# Patient Record
Sex: FEMALE | Race: BLACK OR AFRICAN AMERICAN | NOT HISPANIC OR LATINO | Employment: FULL TIME | URBAN - METROPOLITAN AREA
[De-identification: names, ages, dates, MRNs, and addresses within clinical notes are randomized per-mention and may not be internally consistent; named-entity substitution may affect disease eponyms.]

---

## 2017-01-05 ENCOUNTER — OFFICE VISIT (OUTPATIENT)
Dept: FAMILY MEDICINE CLINIC | Facility: CLINIC | Age: 52
End: 2017-01-05

## 2017-01-05 VITALS
RESPIRATION RATE: 16 BRPM | SYSTOLIC BLOOD PRESSURE: 120 MMHG | TEMPERATURE: 98.3 F | WEIGHT: 196 LBS | HEART RATE: 92 BPM | DIASTOLIC BLOOD PRESSURE: 70 MMHG | BODY MASS INDEX: 34.72 KG/M2

## 2017-01-05 DIAGNOSIS — N76.0 ACUTE VAGINITIS: Primary | ICD-10-CM

## 2017-01-05 PROCEDURE — 99213 OFFICE O/P EST LOW 20 MIN: CPT | Performed by: FAMILY MEDICINE

## 2017-01-05 RX ORDER — KETOCONAZOLE 200 MG/1
200 TABLET ORAL DAILY
Qty: 10 TABLET | Refills: 0 | Status: SHIPPED | OUTPATIENT
Start: 2017-01-05 | End: 2017-05-09

## 2017-01-05 RX ORDER — IBUPROFEN AND FAMOTIDINE 800; 26.6 MG/1; MG/1
TABLET, COATED ORAL
COMMUNITY
Start: 2016-12-31 | End: 2017-05-09

## 2017-01-05 NOTE — PROGRESS NOTES
Subjective   Astrid Nobles is a 51 y.o. female.     History of Present Illness   Three weeks of vaginal discharge, not itchy, scant odor.   Worse after bubble bath.  Tried three day Monostat that helped. No fever No GI upset. No skin rash.   The following portions of the patient's history were reviewed and updated as appropriate: allergies, current medications, past family history, past medical history, past social history, past surgical history and problem list.    Review of Systems   Constitutional: Negative.    HENT: Negative.    Eyes: Negative.    Respiratory: Negative.    Cardiovascular: Negative.    Gastrointestinal: Negative.    Endocrine: Negative.    Genitourinary: Negative.  Negative for difficulty urinating, flank pain, genital sores and vaginal bleeding.   Musculoskeletal: Negative.        Objective   Physical Exam   Constitutional: She appears well-developed.   Pulmonary/Chest: Effort normal.   Skin: Skin is warm and dry. No rash noted.   Vitals reviewed.      Assessment/Plan   Astrid was seen today for vaginal discharge.    Diagnoses and all orders for this visit:    Acute vaginitis  -     ketoconazole (NIZORAL) 200 MG tablet; Take 1 tablet by mouth Daily.

## 2017-01-05 NOTE — MR AVS SNAPSHOT
Astrid Nobles   1/5/2017 3:00 PM   Office Visit    Provider:  Raf Suero MD   Department:  [facility]   Dept Phone:  [phone]                Your Full Care Plan              Today's Medication Changes          These changes are accurate as of: 1/5/17  3:47 PM.  If you have any questions, ask your nurse or doctor.               New Medication(s)Ordered:     ketoconazole 200 MG tablet   Commonly known as:  NIZORAL   Take 1 tablet by mouth Daily.   Started by:  Raf Suero MD         Stop taking medication(s)listed here:     meclizine 50 MG tablet   Commonly known as:  ANTIVERT   Stopped by:  Raf Suero MD                Where to Get Your Medications      These medications were sent to [address] - [address] [place] [address] - [phone]  - [phone] [address] [address]     Phone:  [phone]     ketoconazole 200 MG tablet                  Your Updated Medication List          This list is accurate as of: 1/5/17  3:47 PM.  Always use your most recent med list.                DUEXIS 800-26.6 MG tablet   Generic drug:  Ibuprofen-Famotidine       ketoconazole 200 MG tablet   Commonly known as:  NIZORAL   Take 1 tablet by mouth Daily.               You Were Diagnosed With        Codes Comments    Acute vaginitis    -  Primary ICD-10-CM: N76.0  ICD-9-CM: 616.10       Instructions     None    Patient Instructions History      MyChart Signup     Our records indicate that you have declined [facility] MyChart signup. If you would like to sign up for [employer], please email [email or web address] or call [phone] to obtain an activation code.             Other Info from Your Visit           Your Appointments     Feb 16, 2017 10:30 AM EST   GYN FOLLOW UP with Boaz Diaz MD   [facility] (--)    [address] [facility] [address]  [facility] [address]   [phone]              Allergies     No Known Allergies      Reason for Visit     Vaginal Discharge           Vital Signs     Blood Pressure Pulse Temperature Respirations Weight Body Mass Index    120/70 92 98.3 °F (36.8 °C) 16 196 lb (88.9 kg) 34.72 kg/m2    Smoking Status                   Never Smoker           Problems and Diagnoses Noted     Inflammation of vagina    -  Primary

## 2017-05-09 ENCOUNTER — OFFICE VISIT (OUTPATIENT)
Dept: FAMILY MEDICINE CLINIC | Facility: CLINIC | Age: 52
End: 2017-05-09

## 2017-05-09 VITALS
HEART RATE: 92 BPM | SYSTOLIC BLOOD PRESSURE: 110 MMHG | DIASTOLIC BLOOD PRESSURE: 70 MMHG | RESPIRATION RATE: 16 BRPM | BODY MASS INDEX: 34.37 KG/M2 | WEIGHT: 194 LBS | TEMPERATURE: 98.1 F

## 2017-05-09 DIAGNOSIS — J06.9 ACUTE URI: Primary | ICD-10-CM

## 2017-05-09 PROCEDURE — 99213 OFFICE O/P EST LOW 20 MIN: CPT | Performed by: FAMILY MEDICINE

## 2017-05-09 RX ORDER — CEPHALEXIN 500 MG/1
500 CAPSULE ORAL 3 TIMES DAILY
Qty: 30 CAPSULE | Refills: 0 | Status: SHIPPED | OUTPATIENT
Start: 2017-05-09 | End: 2017-06-20

## 2017-05-09 RX ORDER — GUAIFENESIN AND CODEINE PHOSPHATE 100; 10 MG/5ML; MG/5ML
5 SOLUTION ORAL 3 TIMES DAILY PRN
Qty: 118 ML | Refills: 0 | Status: SHIPPED | OUTPATIENT
Start: 2017-05-09 | End: 2017-06-20

## 2017-06-20 ENCOUNTER — OFFICE VISIT (OUTPATIENT)
Dept: FAMILY MEDICINE CLINIC | Facility: CLINIC | Age: 52
End: 2017-06-20

## 2017-06-20 VITALS
HEART RATE: 78 BPM | OXYGEN SATURATION: 97 % | SYSTOLIC BLOOD PRESSURE: 114 MMHG | BODY MASS INDEX: 33.42 KG/M2 | HEIGHT: 63 IN | DIASTOLIC BLOOD PRESSURE: 70 MMHG | WEIGHT: 188.6 LBS

## 2017-06-20 DIAGNOSIS — M72.2 PLANTAR FASCIA SYNDROME: Primary | ICD-10-CM

## 2017-06-20 PROCEDURE — 99212 OFFICE O/P EST SF 10 MIN: CPT | Performed by: FAMILY MEDICINE

## 2017-06-20 NOTE — PROGRESS NOTES
Subjective   Astrid Nobles is a 52 y.o. female.     History of Present Illness   BP at podiatry 138/ .  Treated for plantar fascitis. Wearing walking boot now along with physical therapy.  Even had MRI.   The following portions of the patient's history were reviewed and updated as appropriate: allergies, current medications, past family history, past medical history, past social history, past surgical history and problem list.    Review of Systems   Constitutional: Negative.    Respiratory: Negative.    Cardiovascular: Negative for leg swelling.   Neurological: Negative for dizziness and headaches.       Objective   Physical Exam   Constitutional: She is oriented to person, place, and time. She appears well-developed and well-nourished. No distress.   Neck: Neck supple.   Cardiovascular: Normal rate and regular rhythm.    No murmur heard.  Pulmonary/Chest: Effort normal and breath sounds normal.   Lymphadenopathy:     She has no cervical adenopathy.   Neurological: She is alert and oriented to person, place, and time.   Vitals reviewed.      Assessment/Plan   Astrid was seen today for hypertension.    Diagnoses and all orders for this visit:    Plantar fascia syndrome      Continue with podiatry   No treatment for blood pressure at this time.

## 2017-09-06 ENCOUNTER — OFFICE VISIT (OUTPATIENT)
Dept: FAMILY MEDICINE CLINIC | Facility: CLINIC | Age: 52
End: 2017-09-06

## 2017-09-06 VITALS
DIASTOLIC BLOOD PRESSURE: 60 MMHG | WEIGHT: 190 LBS | RESPIRATION RATE: 16 BRPM | SYSTOLIC BLOOD PRESSURE: 100 MMHG | TEMPERATURE: 98.4 F | HEART RATE: 72 BPM | BODY MASS INDEX: 33.66 KG/M2

## 2017-09-06 DIAGNOSIS — L81.9 CHANGE IN PIGMENTED SKIN LESION: Primary | ICD-10-CM

## 2017-09-06 PROCEDURE — 99213 OFFICE O/P EST LOW 20 MIN: CPT | Performed by: FAMILY MEDICINE

## 2017-09-06 RX ORDER — CLOTRIMAZOLE AND BETAMETHASONE DIPROPIONATE 10; .5 MG/ML; MG/ML
LOTION TOPICAL 2 TIMES DAILY
Qty: 30 ML | Refills: 1 | Status: SHIPPED | OUTPATIENT
Start: 2017-09-06 | End: 2017-10-13

## 2017-09-06 NOTE — PROGRESS NOTES
Subjective   Astrid Nobles is a 52 y.o. female.     History of Present Illness   Past week noticed decreased skin coloration left Achilles and ankle.  No itching, no dry skin or scaling.  Noticed while applying lotion. No foot or ankle pain.  No other pigmentation changes, no darkened spots. No vision difficulties.   The following portions of the patient's history were reviewed and updated as appropriate: allergies, current medications, past family history, past medical history, past social history, past surgical history and problem list.    Review of Systems   Constitutional: Negative.    HENT: Negative.    Skin: Positive for color change.       Objective   Physical Exam   Constitutional: She appears well-developed and well-nourished. No distress.   Pulmonary/Chest: Effort normal.   Neurological: She is alert.   Skin: Skin is intact.   Decreased pigmentation left medial ankle and heel.  No rough or inflamed skin.    Vitals reviewed.      Assessment/Plan   Astrid was seen today for skin problem.    Diagnoses and all orders for this visit:    Change in pigmented skin lesion  -     clotrimazole-betamethasone (LOTRISONE) 1-0.05 % lotion; Apply  topically 2 (Two) Times a Day.

## 2017-09-28 ENCOUNTER — TRANSCRIBE ORDERS (OUTPATIENT)
Dept: ADMINISTRATIVE | Facility: HOSPITAL | Age: 52
End: 2017-09-28

## 2017-09-28 DIAGNOSIS — R92.8 ABNORMAL MAMMOGRAM: Primary | ICD-10-CM

## 2017-10-13 ENCOUNTER — OFFICE VISIT (OUTPATIENT)
Dept: FAMILY MEDICINE CLINIC | Facility: CLINIC | Age: 52
End: 2017-10-13

## 2017-10-13 VITALS
RESPIRATION RATE: 16 BRPM | WEIGHT: 196 LBS | TEMPERATURE: 98.6 F | DIASTOLIC BLOOD PRESSURE: 80 MMHG | SYSTOLIC BLOOD PRESSURE: 118 MMHG | BODY MASS INDEX: 34.72 KG/M2 | HEART RATE: 76 BPM

## 2017-10-13 DIAGNOSIS — K29.00 ACUTE GASTRITIS WITHOUT HEMORRHAGE, UNSPECIFIED GASTRITIS TYPE: Primary | ICD-10-CM

## 2017-10-13 PROCEDURE — 99213 OFFICE O/P EST LOW 20 MIN: CPT | Performed by: FAMILY MEDICINE

## 2017-10-13 RX ORDER — OMEPRAZOLE 20 MG/1
20 TABLET, DELAYED RELEASE ORAL DAILY
Qty: 30 TABLET | Refills: 1 | Status: SHIPPED | OUTPATIENT
Start: 2017-10-13 | End: 2017-12-15 | Stop reason: SDUPTHER

## 2017-10-13 NOTE — PROGRESS NOTES
Subjective   Astrid Nobles is a 52 y.o. female.     History of Present Illness   Four days of epigastric burning without nausea.  No night discomfort. Some belching, not bloated.  No bowel trouble, no cough.  Took no medication. Some improvement with eating. No back or shoulder pain. No use of ibuprofen or aspirin.  The following portions of the patient's history were reviewed and updated as appropriate: allergies, current medications, past family history, past medical history, past social history, past surgical history and problem list.    Review of Systems   Constitutional: Negative.    HENT: Negative.    Respiratory: Negative.    Cardiovascular: Negative.    Gastrointestinal: Negative for abdominal distention, abdominal pain, constipation and nausea.   Allergic/Immunologic: Negative.        Objective   Physical Exam   Constitutional: She appears well-developed.   Neck: Neck supple.   Cardiovascular: Regular rhythm.    Pulmonary/Chest: Breath sounds normal.   Abdominal: Soft. Bowel sounds are normal. She exhibits no distension and no mass.   Vitals reviewed.      Assessment/Plan   Astrid was seen today for gi problem.    Diagnoses and all orders for this visit:    Acute gastritis without hemorrhage, unspecified gastritis type  -     omeprazole OTC (PRILOSEC OTC) 20 MG EC tablet; Take 1 tablet by mouth Daily.

## 2017-11-09 ENCOUNTER — APPOINTMENT (OUTPATIENT)
Dept: MAMMOGRAPHY | Facility: HOSPITAL | Age: 52
End: 2017-11-09

## 2017-12-15 DIAGNOSIS — K29.00 ACUTE GASTRITIS WITHOUT HEMORRHAGE, UNSPECIFIED GASTRITIS TYPE: ICD-10-CM

## 2017-12-15 RX ORDER — OMEPRAZOLE 20 MG/1
CAPSULE, DELAYED RELEASE ORAL
Qty: 30 CAPSULE | Refills: 3 | Status: SHIPPED | OUTPATIENT
Start: 2017-12-15 | End: 2017-12-28 | Stop reason: SDUPTHER

## 2017-12-26 ENCOUNTER — HOSPITAL ENCOUNTER (OUTPATIENT)
Dept: MAMMOGRAPHY | Facility: HOSPITAL | Age: 52
Discharge: HOME | End: 2017-12-26
Admitting: FAMILY MEDICINE

## 2017-12-26 DIAGNOSIS — R92.8 ABNORMAL MAMMOGRAM: ICD-10-CM

## 2017-12-26 PROCEDURE — 77066 DX MAMMO INCL CAD BI: CPT | Performed by: RADIOLOGY

## 2017-12-26 PROCEDURE — G0279 TOMOSYNTHESIS, MAMMO: HCPCS

## 2017-12-26 PROCEDURE — 77062 BREAST TOMOSYNTHESIS BI: CPT | Performed by: RADIOLOGY

## 2017-12-26 PROCEDURE — G0204 DX MAMMO INCL CAD BI: HCPCS

## 2017-12-28 ENCOUNTER — OFFICE VISIT (OUTPATIENT)
Dept: FAMILY MEDICINE CLINIC | Facility: CLINIC | Age: 52
End: 2017-12-28

## 2017-12-28 VITALS
RESPIRATION RATE: 16 BRPM | DIASTOLIC BLOOD PRESSURE: 80 MMHG | SYSTOLIC BLOOD PRESSURE: 136 MMHG | HEART RATE: 68 BPM | TEMPERATURE: 98.7 F | BODY MASS INDEX: 34.54 KG/M2 | WEIGHT: 195 LBS

## 2017-12-28 DIAGNOSIS — K29.00 ACUTE GASTRITIS WITHOUT HEMORRHAGE, UNSPECIFIED GASTRITIS TYPE: Primary | ICD-10-CM

## 2017-12-28 PROCEDURE — 99213 OFFICE O/P EST LOW 20 MIN: CPT | Performed by: FAMILY MEDICINE

## 2017-12-28 RX ORDER — OMEPRAZOLE 20 MG/1
20 CAPSULE, DELAYED RELEASE ORAL DAILY
Qty: 30 CAPSULE | Refills: 5 | Status: SHIPPED | OUTPATIENT
Start: 2017-12-28 | End: 2018-03-29

## 2017-12-28 NOTE — PROGRESS NOTES
Subjective   Astrid Nobles is a 52 y.o. female.     History of Present Illness   Continues to have acid symptoms if not taking omeprazole. No bloating, no night pain. No bowel trouble.  Left medial ankle sofr tissue skin darkening 2 cm area. Not tender or itching.  Past plantar facsiatis.   The following portions of the patient's history were reviewed and updated as appropriate: allergies, current medications, past family history, past medical history, past social history, past surgical history and problem list.    Review of Systems   Constitutional: Negative.    Respiratory: Negative.    Cardiovascular: Negative.    Gastrointestinal: Negative for abdominal distention, abdominal pain and constipation.   Musculoskeletal: Negative for gait problem.       Objective   Physical Exam   Constitutional: She appears well-developed.   Pulmonary/Chest: Effort normal.   Neurological: She is alert.   Vitals reviewed.      Assessment/Plan   Astrid was seen today for follow-up.    Diagnoses and all orders for this visit:    Acute gastritis without hemorrhage, unspecified gastritis type  -     omeprazole (priLOSEC) 20 MG capsule; Take 1 capsule by mouth Daily.

## 2018-01-15 ENCOUNTER — OFFICE VISIT (OUTPATIENT)
Dept: FAMILY MEDICINE CLINIC | Facility: CLINIC | Age: 53
End: 2018-01-15

## 2018-01-15 VITALS
RESPIRATION RATE: 16 BRPM | BODY MASS INDEX: 34.54 KG/M2 | SYSTOLIC BLOOD PRESSURE: 136 MMHG | TEMPERATURE: 98.3 F | WEIGHT: 195 LBS | HEART RATE: 80 BPM | DIASTOLIC BLOOD PRESSURE: 80 MMHG

## 2018-01-15 DIAGNOSIS — J06.9 ACUTE URI: Primary | ICD-10-CM

## 2018-01-15 PROCEDURE — 99213 OFFICE O/P EST LOW 20 MIN: CPT | Performed by: FAMILY MEDICINE

## 2018-01-15 RX ORDER — PROMETHAZINE HYDROCHLORIDE AND CODEINE PHOSPHATE 6.25; 1 MG/5ML; MG/5ML
5 SYRUP ORAL EVERY 6 HOURS PRN
Qty: 118 ML | Refills: 0 | Status: SHIPPED | OUTPATIENT
Start: 2018-01-15 | End: 2018-03-13 | Stop reason: HOSPADM

## 2018-01-15 RX ORDER — AZITHROMYCIN 250 MG/1
TABLET, FILM COATED ORAL
Qty: 6 TABLET | Refills: 0 | Status: SHIPPED | OUTPATIENT
Start: 2018-01-15 | End: 2018-03-29

## 2018-01-15 NOTE — PROGRESS NOTES
Subjective   Astrid Nobles is a 52 y.o. female.     History of Present Illness   Congestion, cough and some sputum two days. Took no medication. No GI upset.  Rested [ppr from cough.   The following portions of the patient's history were reviewed and updated as appropriate: allergies, current medications, past family history, past medical history, past social history, past surgical history and problem list.    Review of Systems   Constitutional: Positive for fatigue.   HENT: Positive for congestion.    Respiratory: Positive for cough, shortness of breath and wheezing.    Cardiovascular: Negative.    Gastrointestinal: Negative.        Objective   Physical Exam    Assessment/Plan   Astrid was seen today for uri.    Diagnoses and all orders for this visit:    Acute URI  -     azithromycin (ZITHROMAX) 250 MG tablet; Take 2 tablets the first day, then 1 tablet daily on days 2 through 5.  -     promethazine-codeine (PHENERGAN with CODEINE) 6.25-10 MG/5ML syrup; Take 5 mL by mouth Every 6 (Six) Hours As Needed for Cough.

## 2018-01-23 ENCOUNTER — TELEPHONE (OUTPATIENT)
Dept: FAMILY MEDICINE CLINIC | Facility: CLINIC | Age: 53
End: 2018-01-23

## 2018-01-23 DIAGNOSIS — K29.00 ACUTE GASTRITIS WITHOUT HEMORRHAGE, UNSPECIFIED GASTRITIS TYPE: Primary | ICD-10-CM

## 2018-01-23 NOTE — TELEPHONE ENCOUNTER
----- Message from Aspen Calvo sent at 1/23/2018  2:48 PM EST -----  Regarding: CALL PT  Contact: [phone]  PT HAS BEEN TAKING omeprazole (priLOSEC) 20 MG capsule Take 1 capsule by mouth Daily FOR ABOUT A MONTH AND SHE DOESN'T FEEL THAT IT IS HELPING OUT A LOT. SHE IS STILL HAVING THE SAME ISSUES. SHE WOULD LIKE TO KNOW WHAT TO DO NEXT.      Referral has been put in the system for EGD.  Pt notified.  BO Floyd

## 2018-02-06 ENCOUNTER — LAB REQUISITION (OUTPATIENT)
Dept: LAB | Facility: HOSPITAL | Age: 53
End: 2018-02-06

## 2018-02-06 ENCOUNTER — OUTSIDE FACILITY SERVICE (OUTPATIENT)
Dept: GASTROENTEROLOGY | Facility: CLINIC | Age: 53
End: 2018-02-06

## 2018-02-06 DIAGNOSIS — R10.13 EPIGASTRIC PAIN: ICD-10-CM

## 2018-02-06 DIAGNOSIS — K30 FUNCTIONAL DYSPEPSIA: ICD-10-CM

## 2018-02-06 PROCEDURE — 88305 TISSUE EXAM BY PATHOLOGIST: CPT | Performed by: INTERNAL MEDICINE

## 2018-02-06 PROCEDURE — 43239 EGD BIOPSY SINGLE/MULTIPLE: CPT | Performed by: INTERNAL MEDICINE

## 2018-02-07 ENCOUNTER — TELEPHONE (OUTPATIENT)
Dept: GASTROENTEROLOGY | Facility: CLINIC | Age: 53
End: 2018-02-07

## 2018-02-07 ENCOUNTER — LAB (OUTPATIENT)
Dept: LAB | Facility: HOSPITAL | Age: 53
End: 2018-02-07

## 2018-02-07 ENCOUNTER — APPOINTMENT (OUTPATIENT)
Dept: LAB | Facility: HOSPITAL | Age: 53
End: 2018-02-07

## 2018-02-07 DIAGNOSIS — C16.2 MALIGNANT NEOPLASM OF BODY OF STOMACH (HCC): Primary | ICD-10-CM

## 2018-02-07 DIAGNOSIS — C16.2 MALIGNANT NEOPLASM OF BODY OF STOMACH (HCC): ICD-10-CM

## 2018-02-07 LAB
ALBUMIN SERPL-MCNC: 4.1 G/DL (ref 3.2–4.8)
ALBUMIN/GLOB SERPL: 1.5 G/DL (ref 1.5–2.5)
ALP SERPL-CCNC: 81 U/L (ref 25–100)
ALT SERPL W P-5'-P-CCNC: 22 U/L (ref 7–40)
ANION GAP SERPL CALCULATED.3IONS-SCNC: 6 MMOL/L (ref 3–11)
AST SERPL-CCNC: 21 U/L (ref 0–33)
BASOPHILS # BLD AUTO: 0.03 10*3/MM3 (ref 0–0.2)
BASOPHILS NFR BLD AUTO: 0.5 % (ref 0–1)
BILIRUB SERPL-MCNC: 0.4 MG/DL (ref 0.3–1.2)
BUN BLD-MCNC: 11 MG/DL (ref 9–23)
BUN/CREAT SERPL: 15.7 (ref 7–25)
CALCIUM SPEC-SCNC: 8.6 MG/DL (ref 8.7–10.4)
CEA SERPL-MCNC: <0.5 NG/ML (ref 0–2.5)
CHLORIDE SERPL-SCNC: 104 MMOL/L (ref 99–109)
CO2 SERPL-SCNC: 25 MMOL/L (ref 20–31)
CREAT BLD-MCNC: 0.7 MG/DL (ref 0.6–1.3)
DEPRECATED RDW RBC AUTO: 48.8 FL (ref 37–54)
EOSINOPHIL # BLD AUTO: 0.08 10*3/MM3 (ref 0–0.3)
EOSINOPHIL NFR BLD AUTO: 1.3 % (ref 0–3)
ERYTHROCYTE [DISTWIDTH] IN BLOOD BY AUTOMATED COUNT: 20.3 % (ref 11.3–14.5)
GFR SERPL CREATININE-BSD FRML MDRD: 107 ML/MIN/1.73
GLOBULIN UR ELPH-MCNC: 2.7 GM/DL
GLUCOSE BLD-MCNC: 92 MG/DL (ref 70–100)
HCT VFR BLD AUTO: 30.5 % (ref 34.5–44)
HGB BLD-MCNC: 8.8 G/DL (ref 11.5–15.5)
HYPOCHROMIA BLD QL: NORMAL
IMM GRANULOCYTES # BLD: 0.02 10*3/MM3 (ref 0–0.03)
IMM GRANULOCYTES NFR BLD: 0.3 % (ref 0–0.6)
LYMPHOCYTES # BLD AUTO: 1.54 10*3/MM3 (ref 0.6–4.8)
LYMPHOCYTES NFR BLD AUTO: 24.4 % (ref 24–44)
MCH RBC QN AUTO: 19.2 PG (ref 27–31)
MCHC RBC AUTO-ENTMCNC: 28.9 G/DL (ref 32–36)
MCV RBC AUTO: 66.4 FL (ref 80–99)
MONOCYTES # BLD AUTO: 0.5 10*3/MM3 (ref 0–1)
MONOCYTES NFR BLD AUTO: 7.9 % (ref 0–12)
NEUTROPHILS # BLD AUTO: 4.13 10*3/MM3 (ref 1.5–8.3)
NEUTROPHILS NFR BLD AUTO: 65.6 % (ref 41–71)
PLAT MORPH BLD: NORMAL
PLATELET # BLD AUTO: 392 10*3/MM3 (ref 150–450)
PMV BLD AUTO: 10 FL (ref 6–12)
POTASSIUM BLD-SCNC: 4 MMOL/L (ref 3.5–5.5)
PROT SERPL-MCNC: 6.8 G/DL (ref 5.7–8.2)
RBC # BLD AUTO: 4.59 10*6/MM3 (ref 3.89–5.14)
SODIUM BLD-SCNC: 135 MMOL/L (ref 132–146)
WBC MORPH BLD: NORMAL
WBC NRBC COR # BLD: 6.3 10*3/MM3 (ref 3.5–10.8)

## 2018-02-07 PROCEDURE — 85007 BL SMEAR W/DIFF WBC COUNT: CPT

## 2018-02-07 PROCEDURE — 36415 COLL VENOUS BLD VENIPUNCTURE: CPT | Performed by: INTERNAL MEDICINE

## 2018-02-07 PROCEDURE — 82378 CARCINOEMBRYONIC ANTIGEN: CPT | Performed by: INTERNAL MEDICINE

## 2018-02-07 PROCEDURE — 80053 COMPREHEN METABOLIC PANEL: CPT | Performed by: INTERNAL MEDICINE

## 2018-02-07 PROCEDURE — 85025 COMPLETE CBC W/AUTO DIFF WBC: CPT

## 2018-02-07 NOTE — TELEPHONE ENCOUNTER
Dr Matt,  Dr Michelle called from Pathology called. Please call him back concerning this patient. Thanks

## 2018-02-07 NOTE — TELEPHONE ENCOUNTER
I called and discussed the results of the stomach biopsy.  There is evidence of gastric adenocarcinoma.  I will proceed on with a CT scan of the abdomen and pelvis.  I will also order some further lab data.  I discussed a referral to our surgical oncologist and medical oncologist.

## 2018-02-09 ENCOUNTER — HOSPITAL ENCOUNTER (OUTPATIENT)
Dept: CT IMAGING | Facility: HOSPITAL | Age: 53
Discharge: HOME | End: 2018-02-09
Attending: INTERNAL MEDICINE | Admitting: INTERNAL MEDICINE

## 2018-02-09 DIAGNOSIS — C16.2 MALIGNANT NEOPLASM OF BODY OF STOMACH (HCC): ICD-10-CM

## 2018-02-09 DIAGNOSIS — C16.2 MALIGNANT NEOPLASM OF BODY OF STOMACH (HCC): Primary | ICD-10-CM

## 2018-02-09 PROCEDURE — 74177 CT ABD & PELVIS W/CONTRAST: CPT

## 2018-02-09 PROCEDURE — 0 IOPAMIDOL 61 % SOLUTION: Performed by: INTERNAL MEDICINE

## 2018-02-09 RX ADMIN — IOPAMIDOL 90 ML: 612 INJECTION, SOLUTION INTRAVENOUS at 15:07

## 2018-02-09 RX ADMIN — BARIUM SULFATE 450 ML: 21 SUSPENSION ORAL at 02:10

## 2018-02-12 ENCOUNTER — TELEPHONE (OUTPATIENT)
Dept: GASTROENTEROLOGY | Facility: CLINIC | Age: 53
End: 2018-02-12

## 2018-02-12 LAB
CYTO UR: NORMAL
LAB AP CASE REPORT: NORMAL
LAB AP CLINICAL INFORMATION: NORMAL
LAB AP DIAGNOSIS COMMENT: NORMAL
Lab: NORMAL
PATH REPORT.ADDENDUM SPEC: NORMAL
PATH REPORT.FINAL DX SPEC: NORMAL
PATH REPORT.GROSS SPEC: NORMAL

## 2018-02-12 NOTE — TELEPHONE ENCOUNTER
Spoke with Ms. Nobles regarding the CT scan findings.  There was no evidence for metastasis to the liver or extragastric extension.  There was no obvious lymph nodes per the radiology report.  Will proceed on with surgical oncology evaluation with Dr. Nicole Crooks.

## 2018-03-06 ENCOUNTER — TRANSCRIBE ORDERS (OUTPATIENT)
Dept: ADMINISTRATIVE | Facility: HOSPITAL | Age: 53
End: 2018-03-06

## 2018-03-06 DIAGNOSIS — C16.2 MALIGNANT NEOPLASM OF BODY OF STOMACH (HCC): Primary | ICD-10-CM

## 2018-03-08 ENCOUNTER — APPOINTMENT (OUTPATIENT)
Dept: CT IMAGING | Facility: HOSPITAL | Age: 53
End: 2018-03-08
Attending: SURGERY

## 2018-03-09 ENCOUNTER — HOSPITAL ENCOUNTER (OUTPATIENT)
Dept: CT IMAGING | Facility: HOSPITAL | Age: 53
Discharge: HOME | End: 2018-03-09
Attending: SURGERY | Admitting: SURGERY

## 2018-03-09 ENCOUNTER — APPOINTMENT (OUTPATIENT)
Dept: PREADMISSION TESTING | Facility: HOSPITAL | Age: 53
End: 2018-03-09

## 2018-03-09 VITALS — BODY MASS INDEX: 33.35 KG/M2 | WEIGHT: 181.22 LBS | HEIGHT: 62 IN

## 2018-03-09 DIAGNOSIS — C16.2 MALIGNANT NEOPLASM OF BODY OF STOMACH (HCC): ICD-10-CM

## 2018-03-09 LAB
ABO GROUP BLD: NORMAL
B-HCG UR QL: NEGATIVE
BLD GP AB SCN SERPL QL: POSITIVE
COLD SCREEN: POSITIVE
DEPRECATED RDW RBC AUTO: 46.7 FL (ref 37–54)
ERYTHROCYTE [DISTWIDTH] IN BLOOD BY AUTOMATED COUNT: 19.7 % (ref 11.3–14.5)
HCT VFR BLD AUTO: 28.9 % (ref 34.5–44)
HGB BLD-MCNC: 8.5 G/DL (ref 11.5–15.5)
MCH RBC QN AUTO: 19.1 PG (ref 27–31)
MCHC RBC AUTO-ENTMCNC: 29.4 G/DL (ref 32–36)
MCV RBC AUTO: 64.8 FL (ref 80–99)
NONSPECIFIC COLD ANTIBODY: NORMAL
PLATELET # BLD AUTO: 351 10*3/MM3 (ref 150–450)
PMV BLD AUTO: 9.7 FL (ref 6–12)
RBC # BLD AUTO: 4.46 10*6/MM3 (ref 3.89–5.14)
RH BLD: POSITIVE
SALINE SCREEN: NEGATIVE
WBC NRBC COR # BLD: 4.82 10*3/MM3 (ref 3.5–10.8)

## 2018-03-09 PROCEDURE — 86900 BLOOD TYPING SEROLOGIC ABO: CPT | Performed by: SURGERY

## 2018-03-09 PROCEDURE — 86850 RBC ANTIBODY SCREEN: CPT | Performed by: SURGERY

## 2018-03-09 PROCEDURE — 85027 COMPLETE CBC AUTOMATED: CPT | Performed by: ANESTHESIOLOGY

## 2018-03-09 PROCEDURE — 86901 BLOOD TYPING SEROLOGIC RH(D): CPT | Performed by: SURGERY

## 2018-03-09 PROCEDURE — 93005 ELECTROCARDIOGRAM TRACING: CPT

## 2018-03-09 PROCEDURE — 86870 RBC ANTIBODY IDENTIFICATION: CPT | Performed by: SURGERY

## 2018-03-09 PROCEDURE — 71250 CT THORAX DX C-: CPT

## 2018-03-09 PROCEDURE — 93010 ELECTROCARDIOGRAM REPORT: CPT | Performed by: INTERNAL MEDICINE

## 2018-03-09 PROCEDURE — 81025 URINE PREGNANCY TEST: CPT | Performed by: SURGERY

## 2018-03-12 ENCOUNTER — ANESTHESIA EVENT (OUTPATIENT)
Dept: PERIOP | Facility: HOSPITAL | Age: 53
End: 2018-03-12

## 2018-03-13 ENCOUNTER — ANESTHESIA (OUTPATIENT)
Dept: PERIOP | Facility: HOSPITAL | Age: 53
End: 2018-03-13

## 2018-03-13 ENCOUNTER — HOSPITAL ENCOUNTER (OUTPATIENT)
Facility: HOSPITAL | Age: 53
Setting detail: SURGERY ADMIT
Discharge: HOME | End: 2018-03-13
Attending: SURGERY | Admitting: SURGERY

## 2018-03-13 VITALS
TEMPERATURE: 97.5 F | HEIGHT: 62 IN | DIASTOLIC BLOOD PRESSURE: 75 MMHG | BODY MASS INDEX: 33.31 KG/M2 | RESPIRATION RATE: 18 BRPM | WEIGHT: 181 LBS | SYSTOLIC BLOOD PRESSURE: 120 MMHG | OXYGEN SATURATION: 95 % | HEART RATE: 75 BPM

## 2018-03-13 DIAGNOSIS — C16.9 GASTRIC CANCER (HCC): ICD-10-CM

## 2018-03-13 LAB
B-HCG UR QL: NEGATIVE
INTERNAL NEGATIVE CONTROL: NORMAL
INTERNAL POSITIVE CONTROL: REACTIVE
Lab: NORMAL

## 2018-03-13 PROCEDURE — 25010000002 MIDAZOLAM PER 1 MG: Performed by: ANESTHESIOLOGY

## 2018-03-13 PROCEDURE — 25010000002 DEXAMETHASONE PER 1 MG: Performed by: ANESTHESIOLOGY

## 2018-03-13 PROCEDURE — 25010000002 HYDROMORPHONE PER 4 MG: Performed by: ANESTHESIOLOGY

## 2018-03-13 PROCEDURE — 25010000002 FENTANYL CITRATE (PF) 100 MCG/2ML SOLUTION: Performed by: ANESTHESIOLOGY

## 2018-03-13 PROCEDURE — 25010000002 ONDANSETRON PER 1 MG: Performed by: ANESTHESIOLOGY

## 2018-03-13 PROCEDURE — 88112 CYTOPATH CELL ENHANCE TECH: CPT | Performed by: SURGERY

## 2018-03-13 PROCEDURE — 25010000002 NEOSTIGMINE PER 0.5 MG: Performed by: ANESTHESIOLOGY

## 2018-03-13 PROCEDURE — 25010000002 PROPOFOL 10 MG/ML EMULSION: Performed by: ANESTHESIOLOGY

## 2018-03-13 PROCEDURE — 25010000003 CEFAZOLIN IN DEXTROSE 2-4 GM/100ML-% SOLUTION: Performed by: ANESTHESIOLOGY

## 2018-03-13 PROCEDURE — 88305 TISSUE EXAM BY PATHOLOGIST: CPT | Performed by: SURGERY

## 2018-03-13 RX ORDER — DEXAMETHASONE SODIUM PHOSPHATE 4 MG/ML
INJECTION, SOLUTION INTRA-ARTICULAR; INTRALESIONAL; INTRAMUSCULAR; INTRAVENOUS; SOFT TISSUE AS NEEDED
Status: DISCONTINUED | OUTPATIENT
Start: 2018-03-13 | End: 2018-03-13 | Stop reason: SURG

## 2018-03-13 RX ORDER — PROPOFOL 10 MG/ML
VIAL (ML) INTRAVENOUS AS NEEDED
Status: DISCONTINUED | OUTPATIENT
Start: 2018-03-13 | End: 2018-03-13 | Stop reason: SURG

## 2018-03-13 RX ORDER — MAGNESIUM HYDROXIDE 1200 MG/15ML
LIQUID ORAL AS NEEDED
Status: DISCONTINUED | OUTPATIENT
Start: 2018-03-13 | End: 2018-03-13 | Stop reason: HOSPADM

## 2018-03-13 RX ORDER — CEFAZOLIN SODIUM IN 0.9 % NACL 2 G/100 ML
2 PLASTIC BAG, INJECTION (ML) INTRAVENOUS ONCE
Status: DISCONTINUED | OUTPATIENT
Start: 2018-03-13 | End: 2018-03-13 | Stop reason: HOSPADM

## 2018-03-13 RX ORDER — FENTANYL CITRATE 50 UG/ML
50 INJECTION, SOLUTION INTRAMUSCULAR; INTRAVENOUS
Status: DISCONTINUED | OUTPATIENT
Start: 2018-03-13 | End: 2018-03-13 | Stop reason: HOSPADM

## 2018-03-13 RX ORDER — CEFAZOLIN SODIUM 2 G/100ML
INJECTION, SOLUTION INTRAVENOUS AS NEEDED
Status: DISCONTINUED | OUTPATIENT
Start: 2018-03-13 | End: 2018-03-13 | Stop reason: SURG

## 2018-03-13 RX ORDER — SODIUM CHLORIDE 0.9 % (FLUSH) 0.9 %
1-10 SYRINGE (ML) INJECTION AS NEEDED
Status: DISCONTINUED | OUTPATIENT
Start: 2018-03-13 | End: 2018-03-13 | Stop reason: HOSPADM

## 2018-03-13 RX ORDER — FENTANYL CITRATE 50 UG/ML
INJECTION, SOLUTION INTRAMUSCULAR; INTRAVENOUS AS NEEDED
Status: DISCONTINUED | OUTPATIENT
Start: 2018-03-13 | End: 2018-03-13 | Stop reason: SURG

## 2018-03-13 RX ORDER — SODIUM CHLORIDE, SODIUM LACTATE, POTASSIUM CHLORIDE, CALCIUM CHLORIDE 600; 310; 30; 20 MG/100ML; MG/100ML; MG/100ML; MG/100ML
9 INJECTION, SOLUTION INTRAVENOUS CONTINUOUS
Status: DISCONTINUED | OUTPATIENT
Start: 2018-03-13 | End: 2018-03-13 | Stop reason: HOSPADM

## 2018-03-13 RX ORDER — BUPIVACAINE HYDROCHLORIDE AND EPINEPHRINE 2.5; 5 MG/ML; UG/ML
INJECTION, SOLUTION EPIDURAL; INFILTRATION; INTRACAUDAL; PERINEURAL AS NEEDED
Status: DISCONTINUED | OUTPATIENT
Start: 2018-03-13 | End: 2018-03-13 | Stop reason: HOSPADM

## 2018-03-13 RX ORDER — MIDAZOLAM HYDROCHLORIDE 1 MG/ML
INJECTION INTRAMUSCULAR; INTRAVENOUS AS NEEDED
Status: DISCONTINUED | OUTPATIENT
Start: 2018-03-13 | End: 2018-03-13 | Stop reason: SURG

## 2018-03-13 RX ORDER — PROMETHAZINE HYDROCHLORIDE 25 MG/1
25 SUPPOSITORY RECTAL ONCE AS NEEDED
Status: DISCONTINUED | OUTPATIENT
Start: 2018-03-13 | End: 2018-03-13 | Stop reason: HOSPADM

## 2018-03-13 RX ORDER — FAMOTIDINE 20 MG/1
20 TABLET, FILM COATED ORAL ONCE
Status: COMPLETED | OUTPATIENT
Start: 2018-03-13 | End: 2018-03-13

## 2018-03-13 RX ORDER — LIDOCAINE HYDROCHLORIDE 10 MG/ML
0.5 INJECTION, SOLUTION EPIDURAL; INFILTRATION; INTRACAUDAL; PERINEURAL ONCE AS NEEDED
Status: COMPLETED | OUTPATIENT
Start: 2018-03-13 | End: 2018-03-13

## 2018-03-13 RX ORDER — PROMETHAZINE HYDROCHLORIDE 25 MG/1
25 TABLET ORAL ONCE AS NEEDED
Status: DISCONTINUED | OUTPATIENT
Start: 2018-03-13 | End: 2018-03-13 | Stop reason: HOSPADM

## 2018-03-13 RX ORDER — HYDROMORPHONE HYDROCHLORIDE 1 MG/ML
0.5 INJECTION, SOLUTION INTRAMUSCULAR; INTRAVENOUS; SUBCUTANEOUS
Status: DISCONTINUED | OUTPATIENT
Start: 2018-03-13 | End: 2018-03-13 | Stop reason: HOSPADM

## 2018-03-13 RX ORDER — ONDANSETRON 2 MG/ML
INJECTION INTRAMUSCULAR; INTRAVENOUS AS NEEDED
Status: DISCONTINUED | OUTPATIENT
Start: 2018-03-13 | End: 2018-03-13 | Stop reason: SURG

## 2018-03-13 RX ORDER — PROMETHAZINE HYDROCHLORIDE 25 MG/ML
6.25 INJECTION, SOLUTION INTRAMUSCULAR; INTRAVENOUS ONCE AS NEEDED
Status: DISCONTINUED | OUTPATIENT
Start: 2018-03-13 | End: 2018-03-13 | Stop reason: HOSPADM

## 2018-03-13 RX ORDER — ATRACURIUM BESYLATE 10 MG/ML
INJECTION, SOLUTION INTRAVENOUS AS NEEDED
Status: DISCONTINUED | OUTPATIENT
Start: 2018-03-13 | End: 2018-03-13 | Stop reason: SURG

## 2018-03-13 RX ORDER — GLYCOPYRROLATE 0.2 MG/ML
INJECTION INTRAMUSCULAR; INTRAVENOUS AS NEEDED
Status: DISCONTINUED | OUTPATIENT
Start: 2018-03-13 | End: 2018-03-13 | Stop reason: SURG

## 2018-03-13 RX ORDER — SODIUM CHLORIDE, SODIUM LACTATE, POTASSIUM CHLORIDE, CALCIUM CHLORIDE 600; 310; 30; 20 MG/100ML; MG/100ML; MG/100ML; MG/100ML
INJECTION, SOLUTION INTRAVENOUS CONTINUOUS PRN
Status: DISCONTINUED | OUTPATIENT
Start: 2018-03-13 | End: 2018-03-13 | Stop reason: SURG

## 2018-03-13 RX ORDER — OXYCODONE HYDROCHLORIDE AND ACETAMINOPHEN 5; 325 MG/1; MG/1
1 TABLET ORAL ONCE AS NEEDED
Status: COMPLETED | OUTPATIENT
Start: 2018-03-13 | End: 2018-03-13

## 2018-03-13 RX ADMIN — GLYCOPYRROLATE 0.4 MG: 0.2 INJECTION INTRAMUSCULAR; INTRAVENOUS at 16:32

## 2018-03-13 RX ADMIN — DEXAMETHASONE SODIUM PHOSPHATE 8 MG: 4 INJECTION, SOLUTION INTRAMUSCULAR; INTRAVENOUS at 16:00

## 2018-03-13 RX ADMIN — PROPOFOL 150 MG: 10 INJECTION, EMULSION INTRAVENOUS at 15:50

## 2018-03-13 RX ADMIN — SODIUM CHLORIDE, POTASSIUM CHLORIDE, SODIUM LACTATE AND CALCIUM CHLORIDE: 600; 310; 30; 20 INJECTION, SOLUTION INTRAVENOUS at 15:50

## 2018-03-13 RX ADMIN — HYDROMORPHONE HYDROCHLORIDE 0.5 MG: 10 INJECTION INTRAMUSCULAR; INTRAVENOUS; SUBCUTANEOUS at 17:03

## 2018-03-13 RX ADMIN — OXYCODONE AND ACETAMINOPHEN 1 TABLET: 5; 325 TABLET ORAL at 18:27

## 2018-03-13 RX ADMIN — SODIUM CHLORIDE, POTASSIUM CHLORIDE, SODIUM LACTATE AND CALCIUM CHLORIDE 9 ML/HR: 600; 310; 30; 20 INJECTION, SOLUTION INTRAVENOUS at 11:30

## 2018-03-13 RX ADMIN — FAMOTIDINE 20 MG: 20 TABLET, FILM COATED ORAL at 11:28

## 2018-03-13 RX ADMIN — LIDOCAINE HYDROCHLORIDE 0.5 ML: 10 INJECTION, SOLUTION EPIDURAL; INFILTRATION; INTRACAUDAL; PERINEURAL at 11:30

## 2018-03-13 RX ADMIN — MIDAZOLAM HYDROCHLORIDE 2 MG: 1 INJECTION, SOLUTION INTRAMUSCULAR; INTRAVENOUS at 15:50

## 2018-03-13 RX ADMIN — Medication 2.5 MG: at 16:32

## 2018-03-13 RX ADMIN — ONDANSETRON 4 MG: 2 INJECTION INTRAMUSCULAR; INTRAVENOUS at 16:00

## 2018-03-13 RX ADMIN — CEFAZOLIN SODIUM 2 G: 2 INJECTION, SOLUTION INTRAVENOUS at 15:50

## 2018-03-13 RX ADMIN — FENTANYL CITRATE 50 MCG: 50 INJECTION INTRAMUSCULAR; INTRAVENOUS at 18:30

## 2018-03-13 RX ADMIN — FENTANYL CITRATE 100 MCG: 50 INJECTION, SOLUTION INTRAMUSCULAR; INTRAVENOUS at 15:50

## 2018-03-13 RX ADMIN — PROPOFOL 100 MG: 10 INJECTION, EMULSION INTRAVENOUS at 15:56

## 2018-03-13 RX ADMIN — ATRACURIUM BESYLATE 40 MG: 10 INJECTION, SOLUTION INTRAVENOUS at 15:50

## 2018-03-13 RX ADMIN — FENTANYL CITRATE 50 MCG: 50 INJECTION INTRAMUSCULAR; INTRAVENOUS at 17:08

## 2018-03-13 NOTE — DISCHARGE INSTRUCTIONS
1.  You may remove your bandages in 48 hours.  Leave the white strips on as they will fall off on their own.  2.  You may shower in 48 hours.  Gently rinse your incisions with soapy water.  Do not immerse your incisions in water (i.e...tub baths, hot tubs, etc...) until cleared by a physician.  3.  You may supplement your narcotic pain medications with IbuprofenTake a stool softener when taking narcotic pain medications.  4.  Do not drive while on narcotic pain medications.  5.  No heavy lifting greater than 10 pounds for 4 weeks after your surgery.  No strenuous activity (i.e... pushing, pulling, etc...) for 4 weeks after your surgery.  6.  Call the office for any fevers greater than 101.5, nausea, vomiting, pain not relieved by pain medications, redness/swelling/drainage from your incisions, inability to pass flatus or bowel movements, or any other concerning signs or symptoms.  7.  Follow up with Dr. Crooks at [facility] in 2 weeks (ph: [phone]).

## 2018-03-13 NOTE — BRIEF OP NOTE
DIAGNOSTIC LAPAROSCOPY  Progress Note    Astrid Nobles  Date of Surgery:  3/13/2018    Pre-op Diagnosis:   Gastric Cancer       Post-Op Diagnosis Codes:   Gastric cancer     Procedure(s):  Diagnostic Laparoscopy with biopsy of peritoneal nodule  Peritoneal Washings    Surgeon(s):  Nicole Crooks MD    Intravenous Fluids:  1000 mL    Anesthesia: General    Staff:   Circulator: Brandt Sterling RN; Lucila Cabrera RN  Scrub Person: Asifrichard Mckenzie  Nursing Assistant: Dione Wright    Estimated Blood Loss: Minimal    Urine Voided: Not measured    Specimens:                ID Type Source Tests Collected by Time   A : Peritoneal nodule Tissue Abdominal Wall TISSUE PATHOLOGY EXAM Nicole Crooks MD 3/13/2018 1620   B :  Wash Abdominal Wall NON-GYNECOLOGIC CYTOLOGY Nicole Crooks MD 3/13/2018 1627   C :  Wash Abdominal Wall NON-GYNECOLOGIC CYTOLOGY Nicole Crooks MD 3/13/2018 1628   D :  Wash Abdominal Wall NON-GYNECOLOGIC CYTOLOGY Nicole Crooks MD 3/13/2018 1629         Drains:  None    Findings:   1.  No peritoneal nodules or omental disease  2.  Area of serosal thickening in the proximal body of the stomach without visible tumor evident    Complications: None      Nicole Crooks MD     Date: 3/13/2018  Time: 5:26 PM

## 2018-03-13 NOTE — INTERVAL H&P NOTE
"  [facility] Pre-op    Full history and physical note from office is up to date.  See office note attached.    /77 (BP Location: Right arm, Patient Position: Lying)   Pulse 84   Temp 98 °F (36.7 °C) (Temporal Artery )   Resp 18   Ht 157.5 cm (62\")   Wt 82.1 kg (181 lb)   SpO2 99%   Breastfeeding? No   BMI 33.11 kg/m²     IMM:  Influenza:  no  Pneumococcal:  no  Tetanus:  unknown    Cancer Staging (if applicable)  Cancer Patient: __ yes __no __unknown__N/A; If yes, clinical stage T:__ N:__M:__, stage group or __N/A    Manasa Avila, APRN 3/13/2018 12:19 PM    Addendum:  I I have reviewed the above note, my prior clinic note, appropriate imaging, and labs.  There have been no changes to the history and physical.  I have again discussed the risks and benefits of diagnostic laparoscopy with biopsy and peritoneal washings with the patient.  All of their questions have been answered.  They understand and wish to proceed.    "

## 2018-03-13 NOTE — ANESTHESIA PREPROCEDURE EVALUATION
Anesthesia Evaluation                  Airway   Mallampati: I  TM distance: >3 FB  Neck ROM: full  no difficulty expected  Dental - normal exam     Pulmonary - normal exam   Cardiovascular - normal exam        Neuro/Psych  GI/Hepatic/Renal/Endo    (+)  GERD,      Musculoskeletal     Abdominal  - normal exam    Bowel sounds: normal.   Substance History      OB/GYN          Other      history of cancer (GASTRIC)                    Anesthesia Plan    ASA 3     general     intravenous induction   Anesthetic plan and risks discussed with patient.    Plan discussed with CRNA.

## 2018-03-14 NOTE — OP NOTE
General Surgery Operative Note    Astrid Nobles  [id number]  1965    Date of Surgery:  3/13/2018     Pre-op Diagnosis: Gastric Cancer    Post-op Diagnosis: Same    Procedure:     Procedure(s):  DIAGNOSTIC LAPAROSCOPY WITH BIOPSY, LYSIS OF ADHESIONS      Anesthesia: General    Surgeon: Nicole Crooks MD    Fluids: 1000 mL    Estimated Blood Loss: Minimal    Urine Voided: Not measured    Specimens:                ID Type Source Tests Collected by Time   A : Peritoneal nodule Tissue Abdominal Wall TISSUE PATHOLOGY EXAM Nicole Crooks MD 3/13/2018 1620   B :  Wash Abdominal Wall NON-GYNECOLOGIC CYTOLOGY Nicole Crooks MD 3/13/2018 1627   C :  Wash Abdominal Wall NON-GYNECOLOGIC CYTOLOGY Nicole Crooks MD 3/13/2018 1628   D :  Wash Abdominal Wall NON-GYNECOLOGIC CYTOLOGY Nicole Crooks MD 3/13/2018 1629           Findings:   1. Lobulated, soft nodule contained within an adhesion on the anterior abdominal wall.  2. Small area of serosal thickening in the proximal body of the stomach without visible tumor evident.      Complications: None    History:  Mrs. Nobles is a 53-year-old female with a 3 month history of indigestion and upper abdominal pain.  She was started on omeprazole for presumed reflux, but this did not relieve her symptoms.  For additional evaluation, she was referred to Dr. Matt who performed an EGD on 2.6.2018.  This showed a large, friable and ulcerated mass in the body of the stomach; biopsies returned moderately to poorly differentiated adenocarcinoma.  Staging studies, including CT scans of the chest, and pelvis were negative for distant disease.  There appeared to be enlarged lymph nodes along the gastroepiploic arcade concerning for terrance metastases.  I initially evaluated the patient on 3.2.2018 for her new diagnosis of gastric cancer.  To complete her staging, I recommended a diagnostic laparoscopy with peritoneal washings.  I counseled the patient as to the benefits and risks of the  procedure, including but not limited to: Bleeding, infection, injury to adjacent viscera, need for additional procedures or operations, cardiopulmonary complications, and deep venous thrombosis.  The patient understood these risks and wished to proceed.       Procedure:   After informed consent was obtained, the patient was brought to the operating room and placed in the supine position.  Gen. anesthesia was induced, the patient was intubated without difficulty.  Perioperative antibiotics were administered.  The abdomen was prepped and draped in the standard sterile fashion.  A timeout was observed, indicating the patient's name and intended procedure.    Entry to the abdomen was obtained via a 5 mm Optiview trocar in the left upper quadrant along the costal margin.  A brief survey of the underlying viscera was performed with the 0° laparoscope.  This revealed no underlying visceral injury from trocar entry.  At this point, the 0° scope was exchanged for a 30° laparoscope, and two additional ports were placed including one in the left lower abdomen more medial to the first and a second in the right mid abdomen lateral to the inferior epigastric vessels.  The operation was begun by examining the liver for any signs of metastatic disease.  The liver was free of disease.  Both hemidiaphragms were also examined and were determined to be free of disease.  The omentum and small bowel were normal in appearance without peritoneal nodules concerning for disease.  Upon examining the anterior abdominal wall, there was noted to be a fleshy, lobulated white lesion contained within an adhesion.  This was taken down with sharp transection.  In order to remove the specimen, the left upper quadrant 5 mm trocar was upsized to an 11 mm trocar.  The specimen was removed from the abdomen, and it was sent to pathology as a permanent specimen.  Next, the peritoneal washings were performed.  Approximately 30 mL of normal saline was  instilled in the right upper quadrant, left upper quadrant, and pelvis and suctioned out of the abdomen.  These specimens were sent to cytology as permanent specimens.  At this point, the operation was concluded.  All trochars were removed under direct visualization.  The abdomen was desufflated, and all skin incisions were closed with a 4-0 Vicryl in a subcuticular fashion.  The incisions were dressed with Mastisol, Steri-Strips, Telfa, and Tegaderm.  All lap, sponge, and needle counts were noted to be correct at the end of the case.  The patient was extubated and transported to the recovery unit in stable condition.                                                                                                           Nicole Crooks MD     Date: 3/13/2018  Time: 9:32 PM

## 2018-03-15 LAB
CYTO UR: NORMAL
LAB AP CASE REPORT: NORMAL
LAB AP CASE REPORT: NORMAL
LAB AP CLINICAL INFORMATION: NORMAL
LAB AP DIAGNOSIS COMMENT: NORMAL
Lab: NORMAL
Lab: NORMAL
PATH REPORT.FINAL DX SPEC: NORMAL
PATH REPORT.FINAL DX SPEC: NORMAL
PATH REPORT.GROSS SPEC: NORMAL

## 2018-03-29 ENCOUNTER — EDUCATION (OUTPATIENT)
Dept: ONCOLOGY | Facility: HOSPITAL | Age: 53
End: 2018-03-29

## 2018-03-29 ENCOUNTER — CONSULT (OUTPATIENT)
Dept: ONCOLOGY | Facility: CLINIC | Age: 53
End: 2018-03-29

## 2018-03-29 ENCOUNTER — DOCUMENTATION (OUTPATIENT)
Dept: OTHER | Facility: HOSPITAL | Age: 53
End: 2018-03-29

## 2018-03-29 VITALS
SYSTOLIC BLOOD PRESSURE: 138 MMHG | WEIGHT: 184 LBS | RESPIRATION RATE: 16 BRPM | TEMPERATURE: 97.7 F | HEIGHT: 62 IN | DIASTOLIC BLOOD PRESSURE: 77 MMHG | BODY MASS INDEX: 33.86 KG/M2 | HEART RATE: 74 BPM

## 2018-03-29 DIAGNOSIS — C16.9 MALIGNANT NEOPLASM OF STOMACH, UNSPECIFIED LOCATION (HCC): Primary | ICD-10-CM

## 2018-03-29 PROCEDURE — 99205 OFFICE O/P NEW HI 60 MIN: CPT | Performed by: INTERNAL MEDICINE

## 2018-03-29 RX ORDER — DEXAMETHASONE 4 MG/1
TABLET ORAL
Qty: 24 TABLET | Refills: 2 | Status: SHIPPED | OUTPATIENT
Start: 2018-03-29 | End: 2018-08-23 | Stop reason: SDUPTHER

## 2018-03-29 RX ORDER — PALONOSETRON 0.05 MG/ML
0.25 INJECTION, SOLUTION INTRAVENOUS ONCE
Status: CANCELLED | OUTPATIENT
Start: 2018-04-05

## 2018-03-29 RX ORDER — ONDANSETRON HYDROCHLORIDE 8 MG/1
8 TABLET, FILM COATED ORAL 3 TIMES DAILY PRN
Qty: 30 TABLET | Refills: 5 | Status: SHIPPED | OUTPATIENT
Start: 2018-03-29 | End: 2018-07-05 | Stop reason: HOSPADM

## 2018-03-29 RX ORDER — LIDOCAINE AND PRILOCAINE 25; 25 MG/G; MG/G
CREAM TOPICAL AS NEEDED
Qty: 30 G | Refills: 3 | Status: SHIPPED | OUTPATIENT
Start: 2018-03-29 | End: 2018-10-11

## 2018-03-29 RX ORDER — DEXTROSE MONOHYDRATE 50 MG/ML
250 INJECTION, SOLUTION INTRAVENOUS ONCE
Status: CANCELLED | OUTPATIENT
Start: 2018-04-05

## 2018-03-29 NOTE — PROGRESS NOTES
CHIEF COMPLAINT: Gastric carcinoma    REASON FOR REFERRAL: Gastric carcinoma      RECORDS OBTAINED  Records of the patients history including those obtained from  [facility] were reviewed and summarized in detail.       Gastric cancer    2/6/2018 Initial Diagnosis     Gastric cancer.  53-year-old female with a three-month history of indigestion and upper abdominal pain.  She was started on omeprazole for presumed reflux, symptoms were not relieved.  She underwent an EGD on 2/6/2018 with Dr. Matt that revealed a large, friable and ulcerated mass in the body of the stomach.  Biopsies returned moderately to poorly differentiated adenocarcinoma.  Staging studies including CT scan of the chest abdomen and pelvis were negative for distant disease.  She underwent diagnostic laparoscopic with peritoneal washings to complete staging on 3/13/2018.  Baseline CBC 3/9/2018 WBC 4820, hemoglobin 8.5, hematocrit 28.9%, platelet count 351,000, MCV 64.8, MCH 19.1, MCHC 29.4.  CEA normal at less than 0.50.  HER-2/erik testing from gastric body biopsy was negative.  CMP was normal other than for serum calcium slightly decreased at 8.6.           2/6/2018 Procedure     EGD revealed gastric ulcerated mass in body of stomach.  Pathology returned:   Final Diagnosis    1. GASTRIC BODY BIOPSY:  Invasive moderate to poorly differentiated adenocarcinoma with ulceration (see comment).   2. GASTRIC ANTRUM BIOPSY:  Minimal chronic gastritis without activity.  No intestinal metaplasia or dysplasia identified.  No Helicobacter pylori-like organisms identified on routine stains.   HER2/erik testing negative  FLUORESCENCE IN-SITU HYBRIDIZATION (FISH) REPORT:  Negative/Not Amplified  HER2/WILL-17 Ratio: 1.3           2/9/2018 Imaging     CT abdomen/pelvis IMPRESSION:     There is mild thickening and prominence of the mid gastric wall of the  body of the stomach, middle third.     Extragastric mass is not identified. The lesser sac and  "retrogastric  spaces are clear.     Visceral tumor, adenopathy, stranding or caking is not currently  identified as described. There is no retroperitoneal or retrogastric  adenopathy. The lower lung zones are clear with no evidence of reactive  pleural effusion or occult mass. There is no liver metastasis and no  other acute focal CT abnormality is identified within the abdomen or  pelvis.     Incidental note is made of a nonobstructing stone right kidney right  lower pole.         3/9/2018 Imaging     CT Chest IMPRESSION:  No acute intrathoracic abnormality. No definite evidence of  metastatic disease.         3/13/2018 Procedure     Diagnostic laproscopy with biopsy and lysis of adhesions.  Pathology returned:  Final Diagnosis   PERITONEAL NODULE, EXCISIONAL BIOPSY:  Benign fibrotic nodule. (See comment)  JFJ/klb    Electronically signed by Boaz Jerez MD on 3/15/2018 at 1624   Comment See result below    The biopsy shows a lobulated fibrotic nodule with some intermixed lymphoid cells. The nodule does not appear neoplastic. One cannot entirely exclude a \"burned out\" lymph node, or other implant. There is no evidence of carcinoma.      Abdominal wall washings benign:  Final Diagnosis    1. ABDOMINAL WASH, WALL:  Negative for malignancy.  Scant cellularity; chronic inflammatory cells and scattered benign mesothelial cells.   2. ABDOMINAL WASH, WALL:  Negative for malignancy.  Scant cellularity; chronic inflammatory cells and scattered benign mesothelial cells.   3. ABDOMINAL WASH, WALL:  Negative for malignancy.  Benign mesothelial cells and mixed inflammatory cells.                   HISTORY OF PRESENT ILLNESS:  The patient is a 53 y.o.  female, referred for Stage II gastric carcinoma as outlined above.  She presented with abdominal discomfort postprandially.  This led to EGD and subsequent scanning followed by staging laparoscopy and cytologies from peritoneal fluid all were negative as was a benign nodule on " the peritoneum.  Since this had grown deep enough to be visible on CT, Dr. Bradley and Dr. Crooks felt that this was at least T3 and no endoscopic ultrasound was needed and she comes to me for neoadjuvant chemotherapy discussion.    REVIEW OF SYSTEMS:  A 14 point review of systems was performed and is negative except as noted above.    Past Medical History:   Diagnosis Date   • Abdominal pain    • Acid reflux    • Gastric cancer    • Gastric ulcer    • Wears glasses      Past Surgical History:   Procedure Laterality Date   • CERVICAL BIOPSY  W/ LOOP ELECTRODE EXCISION     • DIAGNOSTIC LAPAROSCOPY N/A 3/13/2018    Procedure: DIAGNOSTIC LAPAROSCOPY WITH BIOPSY, LYSIS OF ADHESIONS;  Surgeon: Nicole Crooks MD;  Location: [facility];  Service: General   • ENDOSCOPY     • TUBAL ABDOMINAL LIGATION         Current Outpatient Prescriptions on File Prior to Visit   Medication Sig Dispense Refill   • [DISCONTINUED] azithromycin (ZITHROMAX) 250 MG tablet Take 2 tablets the first day, then 1 tablet daily on days 2 through 5. (Patient not taking: Reported on 3/13/2018) 6 tablet 0   • [DISCONTINUED] omeprazole (priLOSEC) 20 MG capsule Take 1 capsule by mouth Daily. (Patient taking differently: Take 20 mg by mouth As Needed (ACID REFLUX).) 30 capsule 5     No current facility-administered medications on file prior to visit.        No Known Allergies    Social History     Social History   • Marital status: Single     Social History Main Topics   • Smoking status: Never Smoker   • Smokeless tobacco: Never Used   • Alcohol use Yes      Comment: occ   • Drug use: No   • Sexual activity: Defer     Other Topics Concern   • Not on file       Family History   Problem Relation Age of Onset   • Hypertension Mother    • No Known Problems Sister    • No Known Problems Brother    • No Known Problems Daughter    • No Known Problems Son    • No Known Problems Maternal Grandmother    • No Known Problems Paternal Grandmother    • Breast cancer  "Maternal Aunt    • No Known Problems Paternal Aunt    • Ovarian cancer Other        PHYSICAL EXAM:    /77   Pulse 74   Temp 97.7 °F (36.5 °C)   Resp 16   Ht 157.5 cm (62\")   Wt 83.5 kg (184 lb)   BMI 33.65 kg/m²     ECOG: (0) Fully active, able to carry on all predisease performance without restriction  General: well appearing female in no acute distress  HEENT: sclera anicteric, oropharynx clear  Lymphatics: no cervical, supraclavicular, inguinal, or axillary adenopathy  Cardiovascular: regular rate and rhythm, no murmurs  Neck: Supple; No thyromegaly  Lungs: clear to auscultation bilaterally. No respiratory distress.   Abdomen: soft, nontender, nondistended.  No palpable organomegaly  Extremities: no cyanosis, clubbing, edema, or cords  Skin: no rashes, lesions, bruising, or petechiae  Neuro: Alert and oriented x 4; Moving all extremities.  Psych: No anxiety or depression    Admission on 03/13/2018, Discharged on 03/13/2018   Component Date Value Ref Range Status   • HCG, Urine, QL 03/13/2018 Negative  Negative Final   • Lot Number 03/13/2018 [id number]   Final   • Internal Positive Control 03/13/2018 Reactive   Final   • Internal Negative Control 03/13/2018 Test not performed   Final   • Case Report 03/15/2018    Final                    Value:Non-gynecologic Cytology                          Case: [id number]                                  Authorizing Provider:  Nicole Crooks MD          Collected:           03/13/2018 04:27 PM          Ordering Location:     [facility]   Received:            03/14/2018 10:12 AM                                 OR                                                                           Pathologist:           Iván Bonilla MD                                                      Specimens:   1) - Abdominal Wall                                                                                 2) - Abdominal Wall                                          "                                        3) - Abdominal Wall                                                                       • Final Diagnosis 03/15/2018    Final                    Value:This result contains rich text formatting which cannot be displayed here.   • Case Report 03/15/2018    Final                    Value:Surgical Pathology Report                         Case: [id number]                                  Authorizing Provider:  Nicole Crooks MD          Collected:           03/13/2018 04:20 PM          Ordering Location:     [facility]   Received:            03/14/2018 10:09 AM                                 OR                                                                           Pathologist:           Boaz Jerez MD                                                            Specimen:    Abdominal Wall, Peritoneal nodule                                                         • Clinical Information 03/15/2018    Final                    Value:This result contains rich text formatting which cannot be displayed here.   • Final Diagnosis 03/15/2018    Final                    Value:This result contains rich text formatting which cannot be displayed here.   • Comment 03/15/2018    Final                    Value:This result contains rich text formatting which cannot be displayed here.   • Gross Description 03/15/2018    Final                    Value:This result contains rich text formatting which cannot be displayed here.   • Microscopic Description 03/15/2018    Final                    Value:This result contains rich text formatting which cannot be displayed here.   Appointment on 03/09/2018   Component Date Value Ref Range Status   • WBC 03/09/2018 4.82  3.50 - 10.80 10*3/mm3 Final   • RBC 03/09/2018 4.46  3.89 - 5.14 10*6/mm3 Final   • Hemoglobin 03/09/2018 8.5* 11.5 - 15.5 g/dL Final   • Hematocrit 03/09/2018 28.9* 34.5 - 44.0 % Final   • MCV 03/09/2018 64.8* 80.0 - 99.0 fL  Final   • MCH 03/09/2018 19.1* 27.0 - 31.0 pg Final   • MCHC 03/09/2018 29.4* 32.0 - 36.0 g/dL Final   • RDW 03/09/2018 19.7* 11.3 - 14.5 % Final   • RDW-SD 03/09/2018 46.7  37.0 - 54.0 fl Final   • MPV 03/09/2018 9.7  6.0 - 12.0 fL Final   • Platelets 03/09/2018 351  150 - 450 10*3/mm3 Final   • HCG, Urine QL 03/09/2018 Negative  Negative Final   • ABO Type 03/09/2018 A   Corrected   • RH type 03/09/2018 Positive   Final   • Antibody Screen 03/09/2018 Positive   Final   • Cold Screen 03/09/2018 Positive   Final   • Saline Screen 03/09/2018 Negative   Final   • Nonspecific Cold Antibody 03/09/2018 NONSPECIFIC COLD ANTIBODY   Final       Assessment/Plan     1. Gastric adenocarcinoma moderate to poorly differentiated  2. Microcytic anemia due to problem #1    Discussion: I reviewed her case and her images in our tumor board at length with Dr. Crooks and Dr. Bradley.  This is at least T3 radiographically and even if it were T4 art treatment would not change.  The category 1 preferred treatment would be 5-FU leucovorin oxaliplatin Taxotere given every 14 days ×4 cycles preoperatively and then again ×4 cycles postoperatively for a total of 8 cycles.  We will get her to Dr. Crooks for port placement and will have EMLA cream sent as well as Zofran.  I will see her back in a week to get started and we will repeat imaging and probably endoscopy with Dr. Crooks prior to surgery after the fourth cycle.  Assuming she has an excellent response and complete resection, then she would get for adjuvant courses as outlined.  The 5-FU is 2600 mg/m² continuous IV over 24 hours, leucovorin 200 mg/m² day 1, oxaliplatin 85 mg/m² day 1 and docetaxel 50 mg/m² day 1 every 14 days.  We had her educated on each of these medications and I went over them in great detail with her with all of the side effects that are potential and gave her informational sheets and was educated by our pharmacy staff as well and informed consent indicating our  intent to attempt to cure was signed.      Kaden Oswald MD    3/29/2018

## 2018-03-29 NOTE — PLAN OF CARE
Outpatient Infusion • [address] [facility] • [address] • [place] [id number] • [phone]      CHEMOTHERAPY EDUCATION SHEET    NAME:  Astrid Nobles      : 1965           DATE: 18    Booklets Given: Chemotherapy and You []  Eating Hints []    Sexuality/Fertility Books []     Chemotherapy/Biotherapy Education Sheets: (list all that apply)  Fluorouracil, Leucovorin, Oxaliplatin, Docetaxel                                                                                                                                                                 Chemotherapy Regimen:  FLOT every 14 days x4    TOPICS EDUCATION PROVIDED EDUCATION REINFORCED COMMENTS   ANEMIA:  role of RBC, cause, s/s, ways to manage, role of transfusion [x] [] Counseled patient on the method of action of anemia caused by cytotoxic chemotherapy. Counseled patient on the symptoms resulting from anemia. Explained to patient the process of blood tests performed at treatment visits.    THROMBOCYTOPENIA:  role of platelet, cause, s/s, ways to prevent bleeding, things to avoid, when to seek help [x] [] Counseled patient on the mechanism of thrombocytopenia caused by cytotoxic chemotherapy. Counseled patient on the symptoms associated with low platelet counts. Recommended patient to avoid unnecessary activities with increased risk of bleeding. Encouraged patient to call MD with severe bleeding.    NEUTROPENIA:  role of WBC, cause, infection precautions, s/s of infection, when to call MD [x] [] Counseled patient on the mechanism of neutropenia resulting from cytotoxic chemotherapy. Counseled patient on the issues that can arise from decreased immune function. Recommended patient to contact MD with a fever >100.4. Encouraged patient to take reasonable precautions to avoid infection.    NUTRITION & APPETITE CHANGES:  importance of maintaining healthy diet & weight, ways to manage to improve intake, dietary consult, exercise regimen [] []     DIARRHEA:  causes, s/s of dehydration, ways to manage, dietary changes, when to call MD [] [] Counseled patient on the likelihood of diarrhea associated with this chemotherapy regimen. Encouraged patient to treat mild diarrhea at home and counseled on the dosing of loperamide. Recommended patient to contact MD with severe diarrhea not responsive to OTC therapy. Counseled patient on risk of colitis.     CONSTIPATION:  causes, ways to manage, dietary changes, when to call MD [] []    NAUSEA & VOMITING:  cause, use of antiemetics, dietary changes, when to call MD [x] [] Counseled patient on the likelihood of nausea and vomiting with this regimen. Explained the mechanism and role of the antiemetic medications, both at home and during infusion. Counseled patient on the use of at-home antiemetic agents at first sign of nausea.    MOUTH SORES:  causes, oral care, ways to manage [x] [] Counseled patient on the likelihood of mucositis occurring with this regimen. Counseled patient on prevention of mouth sores using baking soda/salt//water mixture. Counseled patient to contact MD if sores do form for prescription treatment.    ALOPECIA:  cause, ways to manage, resources [x] [] Counseled patient on the likelihood of hair thinning with this regimen. Counseled patient on the ability of the clinic to provide them with a prescription for a wig.    INFERTILITY & SEXUALITY:  causes, fertility preservation options, sexuality changes, ways to manage, importance of birth control [x] [] Counseled patient on the importance of safe sex practices for at least the first 48 hours following administration of chemotherapy.    NERVOUS SYSTEM CHANGES:  causes, s/s, neuropathies, cognitive changes, ways to manage [x] [] Counseled patient on the likelihood of peripheral neuropathy with this regimen. Explained the mechanism of neuropathy and the likely symptoms. Recommended patient to contact the MD if the neuropathy begins to impact quality of  life. Counseled patient that neuropathy with oxaliplatin is cold-induced.    PAIN:  causes, ways to manage [] [] ????   SKIN & NAIL CHANGES:  cause, s/s, ways to manage [x] [] Counseled patient on the risk of hand-foot syndrome, maculopapular rash, and sun-sensitivity.    ORGAN TOXICITIES:  cause, s/s, need for diagnostic tests, labs, when to notify MD [] [] Counseled patient on the likelihood of hepatotoxicity with this chemotherapy regimen. Explained to patient the purpose of blood tests to monitor organ function.     SURVIVORSHIP:  distress, distress assessment, secondary malignancies, early/late effects, follow-up, social issues, social support [] []    HOME CARE:  use of spill kits, storing of PO chemo, how to manage bodily fluids [x] [] Counseled patient on the disposal and cleaning of soiled sheets/toilets. Explained the reasoning in preventing others from being exposed to the chemotherapy agents.    MISCELLANEOUS:  drug interactions, administration, vesicant, et [x] [] Counseled patient on the risk of photophobia, taste changes, edema, dyspnea.     Referrals:        Notes: Patient had no additional questions after counseling. Encouraged patient to reach out to myself or a pharmacist with questions or concerns.     Thanks,  Andrew Alonso, PharmD Candidate  Ext. [phone]

## 2018-04-02 ENCOUNTER — TRANSCRIBE ORDERS (OUTPATIENT)
Dept: ADMINISTRATIVE | Facility: HOSPITAL | Age: 53
End: 2018-04-02

## 2018-04-02 DIAGNOSIS — C16.2 MALIGNANT NEOPLASM OF BODY OF STOMACH (HCC): Primary | ICD-10-CM

## 2018-04-03 ENCOUNTER — HOSPITAL ENCOUNTER (OUTPATIENT)
Dept: GENERAL RADIOLOGY | Facility: HOSPITAL | Age: 53
Discharge: HOME | End: 2018-04-03
Attending: SURGERY

## 2018-04-03 DIAGNOSIS — C16.2 MALIGNANT NEOPLASM OF BODY OF STOMACH (HCC): ICD-10-CM

## 2018-04-03 PROCEDURE — 71045 X-RAY EXAM CHEST 1 VIEW: CPT

## 2018-04-04 ENCOUNTER — DOCUMENTATION (OUTPATIENT)
Dept: OTHER | Facility: HOSPITAL | Age: 53
End: 2018-04-04

## 2018-04-04 NOTE — PROGRESS NOTES
Patient called me to ask about when she should take her meds that were prescribed for her. I got off of the phone and looked into pt's med and went and talked with Shira, Pharmacist. Shira said that patient needed to taked her decadron 2 tabs now and 2 before bedtime. In the am patient needs to takes 2 tabs when she wakes up and then 2 more tabs tomorrow before bed and then as prescribed on the label of the pill bottle. Patient verbalized understanding. Patient also knows that her appointment with Dr. Oswald in the am is at 9:45 and then she goes for her first cycle of chemo. I asked patient if she knew to put emla cream on her port and she said that the dressing was still on there and she wanted to wait for someone to show her how to put it on the port. I told her that the Infusion RN would help her tomorrow. Patient verbalized understanding. AG

## 2018-04-05 ENCOUNTER — INFUSION (OUTPATIENT)
Dept: ONCOLOGY | Facility: HOSPITAL | Age: 53
End: 2018-04-05

## 2018-04-05 ENCOUNTER — EDUCATION (OUTPATIENT)
Dept: ONCOLOGY | Facility: HOSPITAL | Age: 53
End: 2018-04-05

## 2018-04-05 ENCOUNTER — OFFICE VISIT (OUTPATIENT)
Dept: ONCOLOGY | Facility: CLINIC | Age: 53
End: 2018-04-05

## 2018-04-05 ENCOUNTER — DOCUMENTATION (OUTPATIENT)
Dept: ONCOLOGY | Facility: CLINIC | Age: 53
End: 2018-04-05

## 2018-04-05 ENCOUNTER — DOCUMENTATION (OUTPATIENT)
Dept: OTHER | Facility: HOSPITAL | Age: 53
End: 2018-04-05

## 2018-04-05 ENCOUNTER — TELEPHONE (OUTPATIENT)
Dept: ONCOLOGY | Facility: CLINIC | Age: 53
End: 2018-04-05

## 2018-04-05 ENCOUNTER — DOCUMENTATION (OUTPATIENT)
Dept: NUTRITION | Facility: HOSPITAL | Age: 53
End: 2018-04-05

## 2018-04-05 VITALS
HEART RATE: 85 BPM | DIASTOLIC BLOOD PRESSURE: 85 MMHG | SYSTOLIC BLOOD PRESSURE: 168 MMHG | RESPIRATION RATE: 16 BRPM | OXYGEN SATURATION: 99 % | TEMPERATURE: 97.6 F | BODY MASS INDEX: 33.47 KG/M2 | WEIGHT: 183 LBS

## 2018-04-05 DIAGNOSIS — C16.9 MALIGNANT NEOPLASM OF STOMACH, UNSPECIFIED LOCATION (HCC): ICD-10-CM

## 2018-04-05 DIAGNOSIS — C16.9 MALIGNANT NEOPLASM OF STOMACH, UNSPECIFIED LOCATION (HCC): Primary | ICD-10-CM

## 2018-04-05 DIAGNOSIS — C80.1 CANCER (HCC): ICD-10-CM

## 2018-04-05 LAB
ABO GROUP BLD: NORMAL
ABO GROUP BLD: NORMAL
ALBUMIN SERPL-MCNC: 4.1 G/DL (ref 3.2–4.8)
ALBUMIN/GLOB SERPL: 1.7 G/DL (ref 1.5–2.5)
ALP SERPL-CCNC: 80 U/L (ref 25–100)
ALT SERPL W P-5'-P-CCNC: 20 U/L (ref 7–40)
ANION GAP SERPL CALCULATED.3IONS-SCNC: 11 MMOL/L (ref 3–11)
AST SERPL-CCNC: 16 U/L (ref 0–33)
BILIRUB SERPL-MCNC: 0.3 MG/DL (ref 0.3–1.2)
BLD GP AB SCN SERPL QL: POSITIVE
BUN BLD-MCNC: 12 MG/DL (ref 9–23)
BUN/CREAT SERPL: 17.1 (ref 7–25)
CALCIUM SPEC-SCNC: 8.8 MG/DL (ref 8.7–10.4)
CHLORIDE SERPL-SCNC: 99 MMOL/L (ref 99–109)
CO2 SERPL-SCNC: 25 MMOL/L (ref 20–31)
COLD SCREEN: POSITIVE
CREAT BLD-MCNC: 0.7 MG/DL (ref 0.6–1.3)
ERYTHROCYTE [DISTWIDTH] IN BLOOD BY AUTOMATED COUNT: 20 % (ref 11.3–14.5)
GFR SERPL CREATININE-BSD FRML MDRD: 106 ML/MIN/1.73
GLOBULIN UR ELPH-MCNC: 2.4 GM/DL
GLUCOSE BLD-MCNC: 146 MG/DL (ref 70–100)
HCT VFR BLD AUTO: 25.6 % (ref 34.5–44)
HGB BLD-MCNC: 7.3 G/DL (ref 11.5–15.5)
LYMPHOCYTES # BLD AUTO: 1.2 10*3/MM3 (ref 0.6–4.8)
LYMPHOCYTES NFR BLD AUTO: 10.7 % (ref 24–44)
MCH RBC QN AUTO: 17.4 PG (ref 27–31)
MCHC RBC AUTO-ENTMCNC: 28.3 G/DL (ref 32–36)
MCV RBC AUTO: 61.2 FL (ref 80–99)
MONOCYTES # BLD AUTO: 0.4 10*3/MM3 (ref 0–1)
MONOCYTES NFR BLD AUTO: 3.3 % (ref 0–12)
NEUTROPHILS # BLD AUTO: 9.5 10*3/MM3 (ref 1.5–8.3)
NEUTROPHILS NFR BLD AUTO: 86 % (ref 41–71)
NONSPECIFIC COLD ANTIBODY: NORMAL
PLATELET # BLD AUTO: 416 10*3/MM3 (ref 150–450)
PMV BLD AUTO: 8 FL (ref 6–12)
POTASSIUM BLD-SCNC: 3.7 MMOL/L (ref 3.5–5.5)
PROT SERPL-MCNC: 6.5 G/DL (ref 5.7–8.2)
RBC # BLD AUTO: 4.18 10*6/MM3 (ref 3.89–5.14)
RH BLD: POSITIVE
RH BLD: POSITIVE
SALINE SCREEN: NEGATIVE
SODIUM BLD-SCNC: 135 MMOL/L (ref 132–146)
T&S EXPIRATION DATE: NORMAL
WBC NRBC COR # BLD: 11.1 10*3/MM3 (ref 3.5–10.8)

## 2018-04-05 PROCEDURE — 96416 CHEMO PROLONG INFUSE W/PUMP: CPT

## 2018-04-05 PROCEDURE — 96375 TX/PRO/DX INJ NEW DRUG ADDON: CPT

## 2018-04-05 PROCEDURE — 86922 COMPATIBILITY TEST ANTIGLOB: CPT

## 2018-04-05 PROCEDURE — 86870 RBC ANTIBODY IDENTIFICATION: CPT

## 2018-04-05 PROCEDURE — 25010000002 LEUCOVORIN CALCIUM PER 50 MG: Performed by: INTERNAL MEDICINE

## 2018-04-05 PROCEDURE — 96413 CHEMO IV INFUSION 1 HR: CPT

## 2018-04-05 PROCEDURE — 25010000002 DOCETAXEL 20 MG/ML CONCENTRATION 1 ML VIAL: Performed by: INTERNAL MEDICINE

## 2018-04-05 PROCEDURE — 25010000002 FLUOROURACIL PER 500 MG: Performed by: INTERNAL MEDICINE

## 2018-04-05 PROCEDURE — 96415 CHEMO IV INFUSION ADDL HR: CPT

## 2018-04-05 PROCEDURE — 86901 BLOOD TYPING SEROLOGIC RH(D): CPT

## 2018-04-05 PROCEDURE — 96417 CHEMO IV INFUS EACH ADDL SEQ: CPT

## 2018-04-05 PROCEDURE — 25010000002 OXALIPLATIN PER 0.5 MG: Performed by: INTERNAL MEDICINE

## 2018-04-05 PROCEDURE — 25010000002 DOCETAXEL 20 MG/ML CONCENTRATION 4 ML VIAL: Performed by: INTERNAL MEDICINE

## 2018-04-05 PROCEDURE — 25010000002 PALONOSETRON PER 25 MCG: Performed by: INTERNAL MEDICINE

## 2018-04-05 PROCEDURE — 96521 REFILL/MAINT PORTABLE PUMP: CPT

## 2018-04-05 PROCEDURE — 85025 COMPLETE CBC W/AUTO DIFF WBC: CPT

## 2018-04-05 PROCEDURE — 96368 THER/DIAG CONCURRENT INF: CPT

## 2018-04-05 PROCEDURE — 99214 OFFICE O/P EST MOD 30 MIN: CPT | Performed by: INTERNAL MEDICINE

## 2018-04-05 PROCEDURE — 25010000002 DEXAMETHASONE PER 1 MG: Performed by: INTERNAL MEDICINE

## 2018-04-05 PROCEDURE — 86920 COMPATIBILITY TEST SPIN: CPT

## 2018-04-05 PROCEDURE — 80053 COMPREHEN METABOLIC PANEL: CPT

## 2018-04-05 PROCEDURE — 86850 RBC ANTIBODY SCREEN: CPT

## 2018-04-05 PROCEDURE — 86900 BLOOD TYPING SEROLOGIC ABO: CPT

## 2018-04-05 RX ORDER — FERROUS SULFATE 325(65) MG
325 TABLET ORAL 3 TIMES DAILY
Qty: 90 TABLET | Refills: 5 | Status: SHIPPED | OUTPATIENT
Start: 2018-04-05 | End: 2018-06-25

## 2018-04-05 RX ORDER — PALONOSETRON 0.05 MG/ML
0.25 INJECTION, SOLUTION INTRAVENOUS ONCE
Status: COMPLETED | OUTPATIENT
Start: 2018-04-05 | End: 2018-04-05

## 2018-04-05 RX ORDER — OXYCODONE HYDROCHLORIDE AND ACETAMINOPHEN 5; 325 MG/1; MG/1
TABLET ORAL
Refills: 0 | COMMUNITY
Start: 2018-04-03 | End: 2018-06-25

## 2018-04-05 RX ORDER — DEXTROSE MONOHYDRATE 50 MG/ML
250 INJECTION, SOLUTION INTRAVENOUS ONCE
Status: COMPLETED | OUTPATIENT
Start: 2018-04-05 | End: 2018-04-05

## 2018-04-05 RX ADMIN — PALONOSETRON HYDROCHLORIDE 0.25 MG: 0.25 INJECTION INTRAVENOUS at 12:03

## 2018-04-05 RX ADMIN — DOCETAXEL 95 MG: 20 INJECTION, SOLUTION, CONCENTRATE INTRAVENOUS at 12:31

## 2018-04-05 RX ADMIN — LEUCOVORIN CALCIUM 370 MG: 100 INJECTION, POWDER, LYOPHILIZED, FOR SOLUTION INTRAMUSCULAR; INTRAVENOUS at 13:59

## 2018-04-05 RX ADMIN — DEXAMETHASONE SODIUM PHOSPHATE 12 MG: 4 INJECTION, SOLUTION INTRAMUSCULAR; INTRAVENOUS at 12:05

## 2018-04-05 RX ADMIN — OXALIPLATIN 155 MG: 100 INJECTION, SOLUTION, CONCENTRATE INTRAVENOUS at 14:00

## 2018-04-05 RX ADMIN — DEXTROSE MONOHYDRATE 250 ML: 50 INJECTION, SOLUTION INTRAVENOUS at 14:00

## 2018-04-05 RX ADMIN — FLUOROURACIL 4810 MG: 2.5 INJECTION, SOLUTION INTRAVENOUS at 16:26

## 2018-04-05 NOTE — PLAN OF CARE
Outpatient Infusion • [address] [facility] • [address] • [place] [id number] • [phone]      CHEMOTHERAPY EDUCATION SHEET    NAME:  Astrid Nobles      : 1965           DATE: 18    Booklets Given: Chemotherapy and You []  Eating Hints []    Sexuality/Fertility Books []     Chemotherapy/Biotherapy Education Sheets: (list all that apply)  Fluorouracil, leucovorin, oxaliplatin, docetaxel                                                                                                                                                                  Chemotherapy Regimen:  FLOT every 14 days x4    TOPICS EDUCATION PROVIDED EDUCATION REINFORCED COMMENTS   ANEMIA:  role of RBC, cause, s/s, ways to manage, role of transfusion [x] [] Counseled patient on the method of action of anemia caused by cytotoxic chemotherapy. Counseled patient on the symptoms resulting from anemia. Explained to patient the process of blood tests performed at treatment visits.    THROMBOCYTOPENIA:  role of platelet, cause, s/s, ways to prevent bleeding, things to avoid, when to seek help [x] [] Counseled patient on the mechanism of thrombocytopenia caused by cytotoxic chemotherapy. Counseled patient on the symptoms associated with low platelet counts. Recommended patient to avoid unnecessary activities with increased risk of bleeding. Encouraged patient to call MD with severe bleeding.    NEUTROPENIA:  role of WBC, cause, infection precautions, s/s of infection, when to call MD [x] [] Counseled patient on the mechanism of neutropenia resulting from cytotoxic chemotherapy. Counseled patient on the issues that can arise from decreased immune function. Recommended patient to contact MD with a fever >100.4. Encouraged patient to take reasonable precautions to avoid infection.    NUTRITION & APPETITE CHANGES:  importance of maintaining healthy diet & weight, ways to manage to improve intake, dietary consult, exercise regimen [x] []  Counseled patient on the risk of taste changes/metallic taste changes.    DIARRHEA:  causes, s/s of dehydration, ways to manage, dietary changes, when to call MD [] [] Counseled patient on the likelihood of diarrhea associated with this chemotherapy regimen. Encouraged patient to treat mild diarrhea at home and counseled on the dosing of loperamide. Recommended patient to contact MD with severe diarrhea not responsive to OTC therapy. Counseled patient on the risk of colitis.    CONSTIPATION:  causes, ways to manage, dietary changes, when to call MD [] []    NAUSEA & VOMITING:  cause, use of antiemetics, dietary changes, when to call MD [x] [] Counseled patient on the likelihood of nausea and vomiting with this regimen. Explained the mechanism and role of the antiemetic medications, both at home and during infusion. Counseled patient on the use of at-home antiemetic agents at first sign of nausea.    MOUTH SORES:  causes, oral care, ways to manage [x] [] Counseled patient on the likelihood of mucositis occurring with this regimen. Counseled patient on prevention of mouth sores using baking soda/salt//water mixture. Counseled patient to contact MD if sores do form for prescription treatment.    ALOPECIA:  cause, ways to manage, resources [x] [] Counseled patient on the likelihood of hair loss with this regimen. Counseled patient on the ability of the clinic to provide them with a prescription for a wig.    INFERTILITY & SEXUALITY:  causes, fertility preservation options, sexuality changes, ways to manage, importance of birth control [x] [] Counseled patient on the importance of safe sex practices for at least the first 48 hours following administration of chemotherapy.    NERVOUS SYSTEM CHANGES:  causes, s/s, neuropathies, cognitive changes, ways to manage [x] [] Counseled patient on the likelihood of peripheral neuropathy with this regimen. Explained the mechanism of neuropathy and the likely symptoms. Recommended  patient to contact the MD if the neuropathy begins to impact quality of life. Counseled patient on cold-induced nature of neuropathy with oxaliplatin.    PAIN:  causes, ways to manage [] [] ????   SKIN & NAIL CHANGES:  cause, s/s, ways to manage [x] [] Counseled patient on risk of rash, progression into SJS, hand-foot syndrome, stomatitis.    ORGAN TOXICITIES:  cause, s/s, need for diagnostic tests, labs, when to notify MD [x] [] Counseled patient on the likelihood of hepatotoxicity with this chemotherapy regimen. Explained to patient the purpose of blood tests to monitor organ function.    SURVIVORSHIP:  distress, distress assessment, secondary malignancies, early/late effects, follow-up, social issues, social support [] []    HOME CARE:  use of spill kits, storing of PO chemo, how to manage bodily fluids [x] [] Counseled patient on the disposal and cleaning of soiled sheets/toilets. Explained the reasoning in preventing others from being exposed to the chemotherapy agents.    MISCELLANEOUS:  drug interactions, administration, vesicant, et [x] [] Counseled patient on risk of photophobia, edema, dyspnea.     Referrals:        Notes: Patient had questions about iron. Spoke to Selma and had her submit order for oral iron to be sent to patient's pharmacy. Encouraged patient to reach out to myself or a pharmacist with questions or concerns.     Thanks,  Andrew Alonso, PharmD Candidate  Ext. [phone]

## 2018-04-05 NOTE — TELEPHONE ENCOUNTER
Pharmacist spoke with patient in infusion about taking oral iron. Rx escribed to her pharmacy.     I spoke with her infusion nurse. The patient told her she was a bit fatigued, but no other symptoms of low blood counts. Her hgb was 7.3. Dr. Oswald would like to offer her 2 units of blood if she wanted.

## 2018-04-05 NOTE — PROGRESS NOTES
Oncology Nutrition Screening    Patient Name:  Astrid Nobles  YOB: 1965  MRN: [id number]  Date:  04/05/18  Physician:  Dr. Kaden Oswald    Type of Cancer Treatment:   Surgery:   Type:  Gastrectomy following neoadjuvant chemo  Chemotherapy:  Type: 5FU Leucovorin; Oxaliplatin; Taxotere  Treatment Schedule: Every 14 days x cycles; surgery and then 4 additional cycles    Patient Active Problem List   Diagnosis   • Gastric cancer   Anemia    Current Outpatient Prescriptions   Medication Sig Dispense Refill   • dexamethasone (DECADRON) 4 MG tablet Take two tablets twice a day the day before, day of, and day after chemotherapy. 24 tablet 2   • ferrous sulfate 325 (65 FE) MG tablet Take 1 tablet by mouth 3 (Three) Times a Day. 90 tablet 5   • lidocaine-prilocaine (EMLA) 2.5-2.5 % cream Apply  topically As Needed (45-60 minutes prior to port access.  Cover with saran/plastic wrap.). 30 g 3   • ondansetron (ZOFRAN) 8 MG tablet Take 1 tablet by mouth 3 (Three) Times a Day As Needed for Nausea or Vomiting. 30 tablet 5   • oxyCODONE-acetaminophen (PERCOCET) 5-325 MG per tablet take 1 to 2 tablets by mouth every 6 hours if needed for pain  0   • Unable to find 1 each 1 (One) Time. Patient does not take any medications.  ~Anna Gotti MA [id number]       No current facility-administered medications for this visit.      Facility-Administered Medications Ordered in Other Visits   Medication Dose Route Frequency Provider Last Rate Last Dose   • fluorouracil (ADRUCIL) 4,810 mg, sodium chloride 0.9 % 23.8 mL chemo infusion - FOR HOME USE  2,600 mg/m2 (Treatment Plan Recorded) Intravenous Once Kaden Oswald MD       • leucovorin 370 mg in dextrose (D5W) 5 % 250 mL IVPB  200 mg/m2 (Treatment Plan Recorded) Intravenous Once Kaden Oswald MD   370 mg at 04/05/18 1359   • OXALIplatin (ELOXATIN) 155 mg in dextrose (D5W) 5 % 250 mL chemo IVPB  85 mg/m2 (Treatment Plan Recorded) Intravenous Once Kaden Oswald MD   155 mg at  04/05/18 1400       Glycemic Risk:   Maureen    Weight:   Height: 62 inches  Weight: 183 lbs.  Usual Body Weight: same   BMI: 33.6  Obese  Weight has been stable    Oral Food Intake:  Regular Diet - No Restrictions    Hydration Status:   How many 8 ounce glass of water of fluid do you drink per day?  To be assessed with consultation    Enteral Feeding:   NA    Nutrition Symptoms:   No Problems with Eating    Activity:   Normal with no limitations     reports that she has never smoked. She has never used smokeless tobacco. She reports that she drinks alcohol. She reports that she does not use drugs.    Evaluation of Nutritional Risk:   Patient is not identified at nutritional risk at this time, but will have increased potential based on diagnosis of gastric cancer, chemotherapy treatment plan and surgery.    Consultation with patient and her mother.  Family history of gastric cancer in her sister, who was diagnosed in 2007 with Stage Iv.  Patient states that she remains with a good appetite and denies any nutritional impact symptoms of nausea, bowel changes, heartburn, etc at this time.  Since diagnosis, she has made changes in her diet to eliminate red meats and focus on a more plant based diet with increased intake of fruits and vegetables.  Discussed the importance of nutrition with diagnosis and treatment plan as outlined.  Encouraged small frequent meals and snacks; high protein with adequate calorie intake to avoid weight changes; healthy focus on fruits and vegetables; various types of nutritional shakes and smoothies that could be used to supplement oral intake.  Discussed possible side effects of nausea, diarrhea and cold sensitivity with Oxaliplatin.  Discussed high iron foods and importance of complimenting high iron foods with Vitamin C source for improved absorption of iron.  Written information provided for nutritional impact symptom management and iron rich foods.  Will follow  closely.        Electronically signed by:  Sirisha Kay, LISY  3:09 PM

## 2018-04-05 NOTE — TELEPHONE ENCOUNTER
----- Message from Reema Laws RN sent at 4/5/2018 12:02 PM EDT -----  Regarding: FAMILIA   Notify for hgb less than  9----7.3.   TX # 1 Taxotere, OX, 5fu  Karolyn  [phone]

## 2018-04-05 NOTE — PROGRESS NOTES
I went over to Infusion to see patient before her first cycle of chemotherapy. Patient is very anxious about today, but does smile at times. I removed port dressing and applied pt's lidocaine ointment over port site and put dressing over the ointment. I also put a glove that was filled with ice over ointment coverderm/dressing. Patient's port site looks intact with signs of infection at this time. I also demonstrated how to put emla cream on port site 30-40 minutes before her next cycle of chemo. Patient has 3 ladies with her today that are taking turns sitting with her during her treatment. I did mention the services of Doreen Paul, [facility], to patient's friends. I will continue to be available to meet patient's needs and navigate through the [facility]. AG

## 2018-04-05 NOTE — PROGRESS NOTES
CHIEF COMPLAINT: Gastric carcinoma    Problem List:     Gastric cancer    2/6/2018 Initial Diagnosis     Gastric cancer.  53-year-old female with a three-month history of indigestion and upper abdominal pain.  She was started on omeprazole for presumed reflux, symptoms were not relieved.  She underwent an EGD on 2/6/2018 with Dr. Matt that revealed a large, friable and ulcerated mass in the body of the stomach.  Biopsies returned moderately to poorly differentiated adenocarcinoma.  Staging studies including CT scan of the chest abdomen and pelvis were negative for distant disease.  She underwent diagnostic laparoscopic with peritoneal washings to complete staging on 3/13/2018.  Baseline CBC 3/9/2018 WBC 4820, hemoglobin 8.5, hematocrit 28.9%, platelet count 351,000, MCV 64.8, MCH 19.1, MCHC 29.4.  CEA normal at less than 0.50.  HER-2/erik testing from gastric body biopsy was negative.  CMP was normal other than for serum calcium slightly decreased at 8.6.           2/6/2018 Procedure     EGD revealed gastric ulcerated mass in body of stomach.  Pathology returned:   Final Diagnosis    1. GASTRIC BODY BIOPSY:  Invasive moderate to poorly differentiated adenocarcinoma with ulceration (see comment).   2. GASTRIC ANTRUM BIOPSY:  Minimal chronic gastritis without activity.  No intestinal metaplasia or dysplasia identified.  No Helicobacter pylori-like organisms identified on routine stains.   HER2/erik testing negative  FLUORESCENCE IN-SITU HYBRIDIZATION (FISH) REPORT:  Negative/Not Amplified  HER2/WILL-17 Ratio: 1.3           2/9/2018 Imaging     CT abdomen/pelvis IMPRESSION:     There is mild thickening and prominence of the mid gastric wall of the  body of the stomach, middle third.     Extragastric mass is not identified. The lesser sac and retrogastric  spaces are clear.     Visceral tumor, adenopathy, stranding or caking is not currently  identified as described. There is no retroperitoneal or  "retrogastric  adenopathy. The lower lung zones are clear with no evidence of reactive  pleural effusion or occult mass. There is no liver metastasis and no  other acute focal CT abnormality is identified within the abdomen or  pelvis.     Incidental note is made of a nonobstructing stone right kidney right  lower pole.         3/9/2018 Imaging     CT Chest IMPRESSION:  No acute intrathoracic abnormality. No definite evidence of  metastatic disease.         3/13/2018 Procedure     Diagnostic laproscopy with biopsy and lysis of adhesions.  Pathology returned:  Final Diagnosis   PERITONEAL NODULE, EXCISIONAL BIOPSY:  Benign fibrotic nodule. (See comment)  JFJ/klb    Electronically signed by Boaz Jerez MD on 3/15/2018 at 1624   Comment See result below    The biopsy shows a lobulated fibrotic nodule with some intermixed lymphoid cells. The nodule does not appear neoplastic. One cannot entirely exclude a \"burned out\" lymph node, or other implant. There is no evidence of carcinoma.      Abdominal wall washings benign:  Final Diagnosis    1. ABDOMINAL WASH, WALL:  Negative for malignancy.  Scant cellularity; chronic inflammatory cells and scattered benign mesothelial cells.   2. ABDOMINAL WASH, WALL:  Negative for malignancy.  Scant cellularity; chronic inflammatory cells and scattered benign mesothelial cells.   3. ABDOMINAL WASH, WALL:  Negative for malignancy.  Benign mesothelial cells and mixed inflammatory cells.                   HISTORY OF PRESENT ILLNESS:  The patient is a 53 y.o. female, here for follow up on management of Gastric carcinoma.  She has her port in and is ready to start.  All questions and then answered and side effects gone over in detail and informed consent signed last visit.  She is also on our Grail study.      Past Medical History:   Diagnosis Date   • Abdominal pain    • Acid reflux    • Gastric cancer    • Gastric ulcer    • Wears glasses      Past Surgical History:   Procedure Laterality " Date   • CERVICAL BIOPSY  W/ LOOP ELECTRODE EXCISION     • DIAGNOSTIC LAPAROSCOPY N/A 3/13/2018    Procedure: DIAGNOSTIC LAPAROSCOPY WITH BIOPSY, LYSIS OF ADHESIONS;  Surgeon: Nicole Crooks MD;  Location: [facility];  Service: General   • ENDOSCOPY     • TUBAL ABDOMINAL LIGATION     • VENOUS ACCESS DEVICE (PORT) INSERTION         No Known Allergies    Family History and Social History reviewed and changed as necessary      REVIEW OF SYSTEM:   Review of Systems   Constitutional: Negative for appetite change, chills, diaphoresis, fatigue, fever and unexpected weight change.   HENT:   Negative for mouth sores, sore throat and trouble swallowing.    Eyes: Negative for icterus.   Respiratory: Negative for cough, hemoptysis and shortness of breath.    Cardiovascular: Negative for chest pain, leg swelling and palpitations.   Gastrointestinal: Negative for abdominal distention, abdominal pain, blood in stool, constipation, diarrhea, nausea and vomiting.   Endocrine: Negative for hot flashes.   Genitourinary: Negative for bladder incontinence, difficulty urinating, dysuria, frequency and hematuria.    Musculoskeletal: Negative for gait problem, neck pain and neck stiffness.   Skin: Negative for rash.   Neurological: Negative for dizziness, gait problem, headaches, light-headedness and numbness.   Hematological: Negative for adenopathy. Does not bruise/bleed easily.   Psychiatric/Behavioral: Negative for depression. The patient is not nervous/anxious.    All other systems reviewed and are negative.       PHYSICAL EXAM    Vitals:    04/05/18 0941   BP: 168/85   Pulse: 85   Resp: 16   Temp: 97.6 °F (36.4 °C)   TempSrc: Temporal Artery    SpO2: 99%   Weight: 83 kg (183 lb)     Constitutional: Appears well-developed and well-nourished. No distress.   ECOG: (0) Fully active, able to carry on all predisease performance without restriction  HENT:   Head: Normocephalic.   Mouth/Throat: Oropharynx is clear and moist.   Eyes:  Conjunctivae are normal. Pupils are equal, round, and reactive to light. No scleral icterus.   Neck: Neck supple. No JVD present. No thyromegaly present.   Cardiovascular: Normal rate, regular rhythm and normal heart sounds.    Pulmonary/Chest: Breath sounds normal. No respiratory distress.   Abdominal: Soft. Exhibits no distension and no mass. There is no hepatosplenomegaly. There is no tenderness. There is no rebound and no guarding.   Musculoskeletal:Exhibits no edema, tenderness or deformity.   Neurological: Alert and oriented to person, place, and time. Exhibits normal muscle tone.   Skin: No ecchymosis, no petechiae and no rash noted. Not diaphoretic. No cyanosis. Nails show no clubbing.   Psychiatric: Normal mood and affect.   Vitals reviewed.      Xr Chest 1 View    Result Date: 4/3/2018  1.  PowerPort well positioned from the right IJ approach with the tip in the upper SVC. 2.  No pneumothorax. 3.  Volume reduction with airspace opacities at both bases, more dense and consolidative on the left.  D:  04/03/2018 E:  04/03/2018  This report was finalized on 4/3/2018 11:32 AM by Dr. Douglas Jones MD.              ASSESSMENT & PLAN:    1.  Gastric carcinoma  2. Anemia    Discussion: we'll encourage her to take iron and we will watch her blood counts and transfuse as need be.  5-FU leucovorin and oxaliplatin and Taxotere will be given as outlined on my initial note.  After the fourth course we will repeat her endoscopy and following resection will give 4 more courses of KODI T.      Kaden Oswald MD    04/05/2018

## 2018-04-06 ENCOUNTER — DOCUMENTATION (OUTPATIENT)
Dept: OTHER | Facility: HOSPITAL | Age: 53
End: 2018-04-06

## 2018-04-06 ENCOUNTER — INFUSION (OUTPATIENT)
Dept: ONCOLOGY | Facility: HOSPITAL | Age: 53
End: 2018-04-06

## 2018-04-06 VITALS
BODY MASS INDEX: 33.68 KG/M2 | WEIGHT: 183 LBS | TEMPERATURE: 99 F | SYSTOLIC BLOOD PRESSURE: 142 MMHG | HEIGHT: 62 IN | HEART RATE: 70 BPM | DIASTOLIC BLOOD PRESSURE: 66 MMHG | RESPIRATION RATE: 16 BRPM

## 2018-04-06 DIAGNOSIS — C16.9 MALIGNANT NEOPLASM OF STOMACH, UNSPECIFIED LOCATION (HCC): Primary | ICD-10-CM

## 2018-04-06 PROCEDURE — P9016 RBC LEUKOCYTES REDUCED: HCPCS

## 2018-04-06 PROCEDURE — 86900 BLOOD TYPING SEROLOGIC ABO: CPT

## 2018-04-06 PROCEDURE — 36430 TRANSFUSION BLD/BLD COMPNT: CPT

## 2018-04-06 PROCEDURE — 25010000002 DIPHENHYDRAMINE PER 50 MG: Performed by: INTERNAL MEDICINE

## 2018-04-06 PROCEDURE — 25010000002 HEPARIN FLUSH (PORCINE) 100 UNIT/ML SOLUTION: Performed by: INTERNAL MEDICINE

## 2018-04-06 PROCEDURE — 96375 TX/PRO/DX INJ NEW DRUG ADDON: CPT

## 2018-04-06 PROCEDURE — 96374 THER/PROPH/DIAG INJ IV PUSH: CPT

## 2018-04-06 RX ORDER — SODIUM CHLORIDE 0.9 % (FLUSH) 0.9 %
10 SYRINGE (ML) INJECTION AS NEEDED
Status: DISCONTINUED | OUTPATIENT
Start: 2018-04-06 | End: 2018-04-06 | Stop reason: HOSPADM

## 2018-04-06 RX ORDER — SODIUM CHLORIDE 9 MG/ML
250 INJECTION, SOLUTION INTRAVENOUS AS NEEDED
Status: CANCELLED | OUTPATIENT
Start: 2018-04-06

## 2018-04-06 RX ORDER — SODIUM CHLORIDE 0.9 % (FLUSH) 0.9 %
10 SYRINGE (ML) INJECTION AS NEEDED
Status: CANCELLED | OUTPATIENT
Start: 2018-04-06

## 2018-04-06 RX ORDER — DIPHENHYDRAMINE HYDROCHLORIDE 50 MG/ML
50 INJECTION INTRAMUSCULAR; INTRAVENOUS ONCE
Status: DISCONTINUED | OUTPATIENT
Start: 2018-04-06 | End: 2018-04-06 | Stop reason: HOSPADM

## 2018-04-06 RX ORDER — ACETAMINOPHEN 325 MG/1
650 TABLET ORAL ONCE
Status: COMPLETED | OUTPATIENT
Start: 2018-04-06 | End: 2018-04-06

## 2018-04-06 RX ORDER — SODIUM CHLORIDE 9 MG/ML
250 INJECTION, SOLUTION INTRAVENOUS AS NEEDED
Status: DISCONTINUED | OUTPATIENT
Start: 2018-04-06 | End: 2018-04-06 | Stop reason: HOSPADM

## 2018-04-06 RX ORDER — DIPHENHYDRAMINE HYDROCHLORIDE 50 MG/ML
50 INJECTION INTRAMUSCULAR; INTRAVENOUS ONCE
Status: COMPLETED | OUTPATIENT
Start: 2018-04-06 | End: 2018-04-06

## 2018-04-06 RX ADMIN — ACETAMINOPHEN 650 MG: 325 TABLET, FILM COATED ORAL at 10:30

## 2018-04-06 RX ADMIN — HEPARIN 500 UNITS: 100 SYRINGE at 17:00

## 2018-04-06 RX ADMIN — DIPHENHYDRAMINE HYDROCHLORIDE 50 MG: 50 INJECTION INTRAMUSCULAR; INTRAVENOUS at 10:35

## 2018-04-06 NOTE — PROGRESS NOTES
I went to see patient while she was getting her first unit of PRBC's. Patient has blood going through a warmer that is attached to her IV pole. Patient is alert and oriented and eating and has no complaints at this time. Patient has my contact number if she needs to contact me. AG

## 2018-04-07 LAB
ABO + RH BLD: NORMAL
ABO + RH BLD: NORMAL
BH BB BLOOD EXPIRATION DATE: NORMAL
BH BB BLOOD EXPIRATION DATE: NORMAL
BH BB BLOOD TYPE BARCODE: 6200
BH BB BLOOD TYPE BARCODE: 6200
BH BB DISPENSE STATUS: NORMAL
BH BB DISPENSE STATUS: NORMAL
BH BB PRODUCT CODE: NORMAL
BH BB PRODUCT CODE: NORMAL
BH BB UNIT NUMBER: NORMAL
BH BB UNIT NUMBER: NORMAL
CROSSMATCH INTERPRETATION: NORMAL
CROSSMATCH INTERPRETATION: NORMAL
UNIT  ABO: NORMAL
UNIT  ABO: NORMAL
UNIT  RH: NORMAL
UNIT  RH: NORMAL

## 2018-04-09 ENCOUNTER — DOCUMENTATION (OUTPATIENT)
Dept: OTHER | Facility: HOSPITAL | Age: 53
End: 2018-04-09

## 2018-04-09 ENCOUNTER — APPOINTMENT (OUTPATIENT)
Dept: LAB | Facility: HOSPITAL | Age: 53
End: 2018-04-09

## 2018-04-09 ENCOUNTER — CLINICAL SUPPORT (OUTPATIENT)
Dept: GENETICS | Facility: HOSPITAL | Age: 53
End: 2018-04-09

## 2018-04-09 DIAGNOSIS — Z13.79 GENETIC TESTING: Primary | ICD-10-CM

## 2018-04-09 DIAGNOSIS — Z80.3 FAMILY HISTORY OF BREAST CANCER: ICD-10-CM

## 2018-04-09 DIAGNOSIS — C16.9 MALIGNANT NEOPLASM OF STOMACH, UNSPECIFIED LOCATION (HCC): Primary | ICD-10-CM

## 2018-04-09 DIAGNOSIS — Z80.41 FAMILY HISTORY OF OVARIAN CANCER: ICD-10-CM

## 2018-04-09 DIAGNOSIS — Z80.0 FAMILY HISTORY OF GASTRIC CANCER: ICD-10-CM

## 2018-04-09 PROCEDURE — 96040: CPT | Performed by: GENETIC COUNSELOR, MS

## 2018-04-09 NOTE — PROGRESS NOTES
Patient is here waiting in [facility] for Hattie Garcia. Patient said that she has been making herself eat. When I asked patient how she felt she said that she was just a little nauseated. I asked her if she took her zofran today, but pt says that she has not and she left the med at home. I encouraged her to take her zofran as needed. Patient says that she will take it when she gets home. Patient has my contact number if she needs navigation. AG

## 2018-04-11 ENCOUNTER — TELEPHONE (OUTPATIENT)
Dept: SOCIAL WORK | Facility: HOSPITAL | Age: 53
End: 2018-04-11

## 2018-04-11 NOTE — PROGRESS NOTES
Astrid Nobles, a 53-year-old female, was referred for genetic counseling due to a personal history of gastric cancer. Ms. Nobles was recently diagnosed with gastric cancer at age 53, and is currently undergoing perioperative chemotherapy. Ms. Nobles retains her uterus and ovaries. Ms. Nobles was interested in discussing her risk for a hereditary cancer syndrome given her personal and family history.  Ms. Nobles was interested in pursuing a multi-gene panel to evaluate her risk of a hereditary cancer syndrome, therefore the CancerNext panel was ordered through [facility] which analyzes 34 genes associated with an increased cancer risk. The genes on this panel include APC, INGRID, BARD1, BMPR1A, BRCA1, BRCA2, BRIP1, CDH1, CDK4, CDKN2A, CHEK2, DICER1, EPCAM, GREM1, HOXB13, MLH1, MRE11A, MSH2, MSH6, MUTYH, NBN, NF1, PALB2, PMS2, POLD1, POLE, PTEN, RAD50, RAD51C, RAD51D, SMAD4, SMARCA4, STK11, and TP53. Results are expected in 2-3 weeks.     PERTINENT FAMILY HISTORY: (See attached pedigree)   Sister:  Gastric cancer, 42  Mat. Aunt: Breast cancer  Mat. Aunt: Ovarian cancer    We do not have medical records regarding any of these diagnoses.      RISK ASSESSMENT:  Ms. Nobles’s personal and family history raises the question of a hereditary cancer syndrome. We discussed genetic testing and the option of pursuing a panel that would test for genes known to impact cancer risk that would include the CDH1 gene, as well as BRCA1/2.   Ms. Nobles clearly meets NCCN guidelines criteria for BRCA1/2 testing based on family history, and consideration of CDH1 testing given personal and family history. These risk assessments are based on the family history information provided at the time of the appointment.  The assessments could change in the future should new information be obtained.    GENETIC COUNSELING (30 minutes):  We reviewed the family history information in detail. Cases of cancer follow three general patterns: sporadic,  familial, and hereditary.  While most cancer is sporadic, some cases appear to occur in family clusters.  These cases are said to be familial and account for 10-20% of cancer cases.  Familial cases may be due to a combination of shared genes and environmental factors among family members.  In even fewer families, the cancer is said to be inherited, and the genes responsible for the cancer are known.      Family histories typical of hereditary cancer syndromes usually include multiple first- and second-degree relatives diagnosed with cancer types that define a syndrome.  These cases tend to be diagnosed at younger-than-expected ages and can be bilateral or multifocal.  The cancer in these families follows an autosomal dominant inheritance pattern, which indicates the likely presence of a mutation in a cancer susceptibility gene.  Children and siblings of an individual believed to carry this mutation have a 50% chance of inheriting that mutation, thereby inheriting the increased risk to develop cancer.  These mutations can be passed down from the maternal or the paternal lineage.    We discussed that hereditary diffuse gastric cancer (HDGC) is an autosomal dominant syndrome characterized by the development of gastric cancer, primarily the diffuse type, at a young age. HDGC has been associated with mutations in the CDH1 gene.  Women with HDGC have as high as an 83% lifetime risk for gastric cancer.  In addition, women have as high as a 39% risk for lobular breast cancer.    Given the family history of breast and ovarian cancer, we discussed that hereditary breast cancer accounts for 5-10% of all cases of breast cancer.  A significant proportion of hereditary breast and ovarian cancer can be attributed to mutations in the BRCA1 and BRCA2 genes.  Mutations in these genes confer an increased risk for breast cancer, ovarian cancer, male breast cancer, prostate cancer, and pancreatic cancer. Women with a BRCA1 or BRCA2  mutation who have already been diagnosed with breast cancer have a 40-60% lifetime risk of a second breast cancer. Women with a BRCA1 or BRCA2 mutation have up to a 44% risk of ovarian cancer.      There are other, more rare, hereditary cancer syndromes. Some of these conditions have well defined cancer risks and established management guidelines.  Other genes that can be tested for have been more recently described, and there may be less data regarding the risks and therefore may not have established management guidelines.  We discussed these limitations at length. Based on Ms. Nobles’s personal and family history and her desire to get more information regarding her personal risks, she opted to pursue testing through a panel evaluating several other genes known to increase the risk for cancer.    GENETIC TESTING:  The risks, benefits and limitations of genetic testing and implications for clinical management following testing were reviewed.  DNA test results can influence decisions regarding screening, prevention and surgical management.  Genetic testing can have significant psychological implications for both individuals and families.  Also discussed was the possibility of employment and insurance discrimination based on genetic test results and the laws in place to prevent this.    We discussed panel testing, which would involve testing for CDH1, BRCA1/2, as well as 31 additional genes that are associated with increased cancer risk. The benefits and limitations of genetic testing were discussed and Ms. Nobles decided to pursue testing via the panel. The implications of a positive or negative test result were discussed. We discussed the possibility that, in some cases, genetic test results may be informative or may be ambiguous due to the identification of a genetic variant. These variants may or may not be associated with an increased cancer risk.  With multigene panel testing, it is not uncommon for a variant of  uncertain significance (VUS) to be identified.  If a VUS is identified, testing family members is typically not recommended and screening recommendations are made based on the family history.  The laboratories that perform genetic testing work to reclassify the VUS and send out an amended report if and when a VUS is reclassified.  The majority of variant findings are ultimately reclassified to a negative result.  Given her personal and family history, a negative test result would not eliminate all breast cancer risk to her relatives, although the risk would not be as high as it would with positive genetic testing.      PLAN: Genetic testing via the CancerNext panel through [facility] was ordered and results are expected in 2-3 weeks.  Ms. Nobles is welcome to contact us in the meantime at [phone] with any questions she may have.      Nelly Bond MS, [facility]  Certified Genetic Counselor

## 2018-04-13 ENCOUNTER — DOCUMENTATION (OUTPATIENT)
Dept: ONCOLOGY | Facility: CLINIC | Age: 53
End: 2018-04-13

## 2018-04-19 ENCOUNTER — DOCUMENTATION (OUTPATIENT)
Dept: ONCOLOGY | Facility: CLINIC | Age: 53
End: 2018-04-19

## 2018-04-19 ENCOUNTER — INFUSION (OUTPATIENT)
Dept: ONCOLOGY | Facility: HOSPITAL | Age: 53
End: 2018-04-19

## 2018-04-19 ENCOUNTER — OFFICE VISIT (OUTPATIENT)
Dept: ONCOLOGY | Facility: CLINIC | Age: 53
End: 2018-04-19

## 2018-04-19 ENCOUNTER — TELEPHONE (OUTPATIENT)
Dept: ONCOLOGY | Facility: CLINIC | Age: 53
End: 2018-04-19

## 2018-04-19 VITALS
DIASTOLIC BLOOD PRESSURE: 71 MMHG | SYSTOLIC BLOOD PRESSURE: 131 MMHG | RESPIRATION RATE: 16 BRPM | TEMPERATURE: 98.9 F | HEART RATE: 90 BPM | BODY MASS INDEX: 33.31 KG/M2 | WEIGHT: 181 LBS | OXYGEN SATURATION: 96 % | HEIGHT: 62 IN

## 2018-04-19 DIAGNOSIS — C16.9 MALIGNANT NEOPLASM OF STOMACH, UNSPECIFIED LOCATION (HCC): Primary | ICD-10-CM

## 2018-04-19 DIAGNOSIS — C16.9 MALIGNANT NEOPLASM OF STOMACH, UNSPECIFIED LOCATION (HCC): ICD-10-CM

## 2018-04-19 LAB
ALBUMIN SERPL-MCNC: 3.9 G/DL (ref 3.2–4.8)
ALBUMIN/GLOB SERPL: 1.4 G/DL (ref 1.5–2.5)
ALP SERPL-CCNC: 74 U/L (ref 25–100)
ALT SERPL W P-5'-P-CCNC: 36 U/L (ref 7–40)
ANION GAP SERPL CALCULATED.3IONS-SCNC: 6 MMOL/L (ref 3–11)
AST SERPL-CCNC: 31 U/L (ref 0–33)
BILIRUB SERPL-MCNC: 0.4 MG/DL (ref 0.3–1.2)
BUN BLD-MCNC: 14 MG/DL (ref 9–23)
BUN/CREAT SERPL: 20 (ref 7–25)
CALCIUM SPEC-SCNC: 8.8 MG/DL (ref 8.7–10.4)
CHLORIDE SERPL-SCNC: 109 MMOL/L (ref 99–109)
CO2 SERPL-SCNC: 23 MMOL/L (ref 20–31)
CREAT BLD-MCNC: 0.7 MG/DL (ref 0.6–1.3)
CREAT BLDA-MCNC: 0.7 MG/DL (ref 0.6–1.3)
ERYTHROCYTE [DISTWIDTH] IN BLOOD BY AUTOMATED COUNT: 26.2 % (ref 11.3–14.5)
GFR SERPL CREATININE-BSD FRML MDRD: 106 ML/MIN/1.73
GLOBULIN UR ELPH-MCNC: 2.7 GM/DL
GLUCOSE BLD-MCNC: 146 MG/DL (ref 70–100)
HCT VFR BLD AUTO: 29.4 % (ref 34.5–44)
HGB BLD-MCNC: 8.5 G/DL (ref 11.5–15.5)
LYMPHOCYTES # BLD AUTO: 1.1 10*3/MM3 (ref 0.6–4.8)
LYMPHOCYTES NFR BLD AUTO: 24.3 % (ref 24–44)
MCH RBC QN AUTO: 18.9 PG (ref 27–31)
MCHC RBC AUTO-ENTMCNC: 28.9 G/DL (ref 32–36)
MCV RBC AUTO: 65.3 FL (ref 80–99)
MONOCYTES # BLD AUTO: 0.3 10*3/MM3 (ref 0–1)
MONOCYTES NFR BLD AUTO: 5.9 % (ref 0–12)
NEUTROPHILS # BLD AUTO: 3.1 10*3/MM3 (ref 1.5–8.3)
NEUTROPHILS NFR BLD AUTO: 69.8 % (ref 41–71)
PLATELET # BLD AUTO: 300 10*3/MM3 (ref 150–450)
PMV BLD AUTO: 8.6 FL (ref 6–12)
POTASSIUM BLD-SCNC: 3.8 MMOL/L (ref 3.5–5.5)
PROT SERPL-MCNC: 6.6 G/DL (ref 5.7–8.2)
RBC # BLD AUTO: 4.51 10*6/MM3 (ref 3.89–5.14)
SODIUM BLD-SCNC: 138 MMOL/L (ref 132–146)
WBC NRBC COR # BLD: 4.4 10*3/MM3 (ref 3.5–10.8)

## 2018-04-19 PROCEDURE — 96367 TX/PROPH/DG ADDL SEQ IV INF: CPT

## 2018-04-19 PROCEDURE — 85025 COMPLETE CBC W/AUTO DIFF WBC: CPT

## 2018-04-19 PROCEDURE — 25010000002 OXALIPLATIN PER 0.5 MG: Performed by: INTERNAL MEDICINE

## 2018-04-19 PROCEDURE — 25010000002 LEUCOVORIN CALCIUM PER 50 MG: Performed by: INTERNAL MEDICINE

## 2018-04-19 PROCEDURE — 82565 ASSAY OF CREATININE: CPT

## 2018-04-19 PROCEDURE — 96375 TX/PRO/DX INJ NEW DRUG ADDON: CPT

## 2018-04-19 PROCEDURE — 96415 CHEMO IV INFUSION ADDL HR: CPT

## 2018-04-19 PROCEDURE — 80053 COMPREHEN METABOLIC PANEL: CPT

## 2018-04-19 PROCEDURE — 96417 CHEMO IV INFUS EACH ADDL SEQ: CPT

## 2018-04-19 PROCEDURE — 25010000002 DOCETAXEL 20 MG/ML CONCENTRATION 1 ML VIAL: Performed by: INTERNAL MEDICINE

## 2018-04-19 PROCEDURE — 96413 CHEMO IV INFUSION 1 HR: CPT

## 2018-04-19 PROCEDURE — 25010000002 DEXAMETHASONE PER 1 MG: Performed by: INTERNAL MEDICINE

## 2018-04-19 PROCEDURE — 99214 OFFICE O/P EST MOD 30 MIN: CPT | Performed by: INTERNAL MEDICINE

## 2018-04-19 PROCEDURE — 96416 CHEMO PROLONG INFUSE W/PUMP: CPT

## 2018-04-19 PROCEDURE — 96368 THER/DIAG CONCURRENT INF: CPT

## 2018-04-19 PROCEDURE — 25010000002 FLUOROURACIL PER 500 MG: Performed by: INTERNAL MEDICINE

## 2018-04-19 PROCEDURE — 25010000002 PALONOSETRON PER 25 MCG: Performed by: INTERNAL MEDICINE

## 2018-04-19 PROCEDURE — 25010000002 DOCETAXEL 20 MG/ML CONCENTRATION 4 ML VIAL: Performed by: INTERNAL MEDICINE

## 2018-04-19 RX ORDER — PALONOSETRON 0.05 MG/ML
0.25 INJECTION, SOLUTION INTRAVENOUS ONCE
Status: CANCELLED | OUTPATIENT
Start: 2018-04-19

## 2018-04-19 RX ORDER — SODIUM CHLORIDE 0.9 % (FLUSH) 0.9 %
10 SYRINGE (ML) INJECTION AS NEEDED
Status: CANCELLED | OUTPATIENT
Start: 2018-04-19

## 2018-04-19 RX ORDER — PALONOSETRON 0.05 MG/ML
0.25 INJECTION, SOLUTION INTRAVENOUS ONCE
Status: COMPLETED | OUTPATIENT
Start: 2018-04-19 | End: 2018-04-19

## 2018-04-19 RX ORDER — DEXTROSE MONOHYDRATE 50 MG/ML
250 INJECTION, SOLUTION INTRAVENOUS ONCE
Status: CANCELLED | OUTPATIENT
Start: 2018-04-19

## 2018-04-19 RX ORDER — SODIUM CHLORIDE 0.9 % (FLUSH) 0.9 %
10 SYRINGE (ML) INJECTION AS NEEDED
Status: DISCONTINUED | OUTPATIENT
Start: 2018-04-19 | End: 2018-04-19 | Stop reason: HOSPADM

## 2018-04-19 RX ORDER — DEXTROSE MONOHYDRATE 50 MG/ML
250 INJECTION, SOLUTION INTRAVENOUS ONCE
Status: COMPLETED | OUTPATIENT
Start: 2018-04-19 | End: 2018-04-19

## 2018-04-19 RX ADMIN — FLUOROURACIL 4810 MG: 2.5 INJECTION, SOLUTION INTRAVENOUS at 13:24

## 2018-04-19 RX ADMIN — LEUCOVORIN CALCIUM 370 MG: 100 INJECTION, POWDER, LYOPHILIZED, FOR SOLUTION INTRAMUSCULAR; INTRAVENOUS at 11:11

## 2018-04-19 RX ADMIN — DEXAMETHASONE SODIUM PHOSPHATE 12 MG: 4 INJECTION, SOLUTION INTRAMUSCULAR; INTRAVENOUS at 09:44

## 2018-04-19 RX ADMIN — DEXTROSE MONOHYDRATE 250 ML: 50 INJECTION, SOLUTION INTRAVENOUS at 11:11

## 2018-04-19 RX ADMIN — PALONOSETRON HYDROCHLORIDE 0.25 MG: 0.25 INJECTION INTRAVENOUS at 09:41

## 2018-04-19 RX ADMIN — DOCETAXEL 95 MG: 80 INJECTION, SOLUTION, CONCENTRATE INTRAVENOUS at 10:02

## 2018-04-19 RX ADMIN — OXALIPLATIN 155 MG: 100 INJECTION, SOLUTION, CONCENTRATE INTRAVENOUS at 11:11

## 2018-04-19 NOTE — TELEPHONE ENCOUNTER
----- Message from Clemencia Lobato RN sent at 4/19/2018  9:31 AM EDT -----  Regarding: DR BARBOSA[HGB  Today's hgb 8.5.    Please advise    Clemencia perez  [phone]

## 2018-04-19 NOTE — PROGRESS NOTES
CHIEF COMPLAINT: Management of gastric carcinoma    Problem List:     Gastric cancer    2/6/2018 Initial Diagnosis     Gastric cancer.  53-year-old female with a three-month history of indigestion and upper abdominal pain.  She was started on omeprazole for presumed reflux, symptoms were not relieved.  She underwent an EGD on 2/6/2018 with Dr. Matt that revealed a large, friable and ulcerated mass in the body of the stomach.  Biopsies returned moderately to poorly differentiated adenocarcinoma.  Staging studies including CT scan of the chest abdomen and pelvis were negative for distant disease.  She underwent diagnostic laparoscopic with peritoneal washings to complete staging on 3/13/2018.  Baseline CBC 3/9/2018 WBC 4820, hemoglobin 8.5, hematocrit 28.9%, platelet count 351,000, MCV 64.8, MCH 19.1, MCHC 29.4.  CEA normal at less than 0.50.  HER-2/erik testing from gastric body biopsy was negative.  CMP was normal other than for serum calcium slightly decreased at 8.6.           2/6/2018 Procedure     EGD revealed gastric ulcerated mass in body of stomach.  Pathology returned:   Final Diagnosis    1. GASTRIC BODY BIOPSY:  Invasive moderate to poorly differentiated adenocarcinoma with ulceration (see comment).   2. GASTRIC ANTRUM BIOPSY:  Minimal chronic gastritis without activity.  No intestinal metaplasia or dysplasia identified.  No Helicobacter pylori-like organisms identified on routine stains.   HER2/erik testing negative  FLUORESCENCE IN-SITU HYBRIDIZATION (FISH) REPORT:  Negative/Not Amplified  HER2/WILL-17 Ratio: 1.3           2/9/2018 Imaging     CT abdomen/pelvis IMPRESSION:     There is mild thickening and prominence of the mid gastric wall of the  body of the stomach, middle third.     Extragastric mass is not identified. The lesser sac and retrogastric  spaces are clear.     Visceral tumor, adenopathy, stranding or caking is not currently  identified as described. There is no retroperitoneal or  "retrogastric  adenopathy. The lower lung zones are clear with no evidence of reactive  pleural effusion or occult mass. There is no liver metastasis and no  other acute focal CT abnormality is identified within the abdomen or  pelvis.     Incidental note is made of a nonobstructing stone right kidney right  lower pole.         3/9/2018 Imaging     CT Chest IMPRESSION:  No acute intrathoracic abnormality. No definite evidence of  metastatic disease.         3/13/2018 Procedure     Diagnostic laproscopy with biopsy and lysis of adhesions.  Pathology returned:  Final Diagnosis   PERITONEAL NODULE, EXCISIONAL BIOPSY:  Benign fibrotic nodule. (See comment)  JFJ/klb    Electronically signed by Boaz Jerez MD on 3/15/2018 at 1624   Comment See result below    The biopsy shows a lobulated fibrotic nodule with some intermixed lymphoid cells. The nodule does not appear neoplastic. One cannot entirely exclude a \"burned out\" lymph node, or other implant. There is no evidence of carcinoma.      Abdominal wall washings benign:  Final Diagnosis    1. ABDOMINAL WASH, WALL:  Negative for malignancy.  Scant cellularity; chronic inflammatory cells and scattered benign mesothelial cells.   2. ABDOMINAL WASH, WALL:  Negative for malignancy.  Scant cellularity; chronic inflammatory cells and scattered benign mesothelial cells.   3. ABDOMINAL WASH, WALL:  Negative for malignancy.  Benign mesothelial cells and mixed inflammatory cells.                   HISTORY OF PRESENT ILLNESS:  The patient is a 53 y.o. female, here for follow up on management of Gastric carcinoma.  She tolerated her first course of FLOT.  She did have some nausea but that was better when she took her Zofran regularly.  No mouth sores anal sores or diarrhea.  No palmar or plantar erythema or desquamation      Past Medical History:   Diagnosis Date   • Abdominal pain    • Acid reflux    • Gastric cancer    • Gastric ulcer    • Wears glasses      Past Surgical History: " "  Procedure Laterality Date   • CERVICAL BIOPSY  W/ LOOP ELECTRODE EXCISION     • DIAGNOSTIC LAPAROSCOPY N/A 3/13/2018    Procedure: DIAGNOSTIC LAPAROSCOPY WITH BIOPSY, LYSIS OF ADHESIONS;  Surgeon: Nicole Crooks MD;  Location: [facility];  Service: General   • ENDOSCOPY     • TUBAL ABDOMINAL LIGATION     • VENOUS ACCESS DEVICE (PORT) INSERTION         No Known Allergies    Family History and Social History reviewed and changed as necessary      REVIEW OF SYSTEM:   Review of Systems   Constitutional: Negative for appetite change, chills, diaphoresis, fatigue, fever and unexpected weight change.   HENT:   Negative for mouth sores, sore throat and trouble swallowing.    Eyes: Negative for icterus.   Respiratory: Negative for cough, hemoptysis and shortness of breath.    Cardiovascular: Negative for chest pain, leg swelling and palpitations.   Gastrointestinal: Negative for abdominal distention, abdominal pain, blood in stool, constipation, diarrhea, nausea and vomiting.   Endocrine: Negative for hot flashes.   Genitourinary: Negative for bladder incontinence, difficulty urinating, dysuria, frequency and hematuria.    Musculoskeletal: Negative for gait problem, neck pain and neck stiffness.   Skin: Negative for rash.   Neurological: Negative for dizziness, gait problem, headaches, light-headedness and numbness.   Hematological: Negative for adenopathy. Does not bruise/bleed easily.   Psychiatric/Behavioral: Negative for depression. The patient is not nervous/anxious.    All other systems reviewed and are negative.       PHYSICAL EXAM    Vitals:    04/19/18 0836   BP: 131/71   Pulse: 90   Resp: 16   Temp: 98.9 °F (37.2 °C)   TempSrc: Temporal Artery    SpO2: 96%   Weight: 82.1 kg (181 lb)   Height: 157.5 cm (62.01\")     Constitutional: Appears well-developed and well-nourished. No distress.   ECOG: (0) Fully active, able to carry on all predisease performance without restriction  HENT:   Head: Normocephalic. "   Mouth/Throat: Oropharynx is clear and moist.   Eyes: Conjunctivae are normal. Pupils are equal, round, and reactive to light. No scleral icterus.   Neck: Neck supple. No JVD present. No thyromegaly present.   Cardiovascular: Normal rate, regular rhythm and normal heart sounds.    Pulmonary/Chest: Breath sounds normal. No respiratory distress.   Abdominal: Soft. Exhibits no distension and no mass. There is no hepatosplenomegaly. There is no tenderness. There is no rebound and no guarding.   Musculoskeletal:Exhibits no edema, tenderness or deformity.   Neurological: Alert and oriented to person, place, and time. Exhibits normal muscle tone.   Skin: No ecchymosis, no petechiae and no rash noted. Not diaphoretic. No cyanosis. Nails show no clubbing.   Psychiatric: Normal mood and affect.   Vitals reviewed.      No radiology results for the last 7 days          ASSESSMENT & PLAN:    1.  Gastric carcinoma  2. Chemotherapy-induced nausea    Discussion: I discussed with the patient 25 minutes greater than 50% of which was counseling regarding her management and symptoms from FLOT chemotherapy.  This will be course #2.  She understands she may get more fatigued even if not more anemic.  We will watch her counts.  She will take her Zofran empirically.  After the fourth course she will get back to Dr. Crooks for repeat evaluation and possible resection.  If she is resected we would then follow that with 4 more courses of chemotherapy.      Kaden Oswald MD    04/19/2018

## 2018-04-20 ENCOUNTER — INFUSION (OUTPATIENT)
Dept: ONCOLOGY | Facility: HOSPITAL | Age: 53
End: 2018-04-20

## 2018-04-20 VITALS
WEIGHT: 178 LBS | HEART RATE: 72 BPM | SYSTOLIC BLOOD PRESSURE: 137 MMHG | BODY MASS INDEX: 32.76 KG/M2 | RESPIRATION RATE: 16 BRPM | HEIGHT: 62 IN | DIASTOLIC BLOOD PRESSURE: 81 MMHG | TEMPERATURE: 98.8 F

## 2018-04-20 DIAGNOSIS — C16.9 MALIGNANT NEOPLASM OF STOMACH, UNSPECIFIED LOCATION (HCC): Primary | ICD-10-CM

## 2018-04-20 PROCEDURE — G0463 HOSPITAL OUTPT CLINIC VISIT: HCPCS

## 2018-04-20 PROCEDURE — 25010000002 HEPARIN FLUSH (PORCINE) 100 UNIT/ML SOLUTION: Performed by: INTERNAL MEDICINE

## 2018-04-20 RX ORDER — SODIUM CHLORIDE 0.9 % (FLUSH) 0.9 %
10 SYRINGE (ML) INJECTION AS NEEDED
Status: CANCELLED | OUTPATIENT
Start: 2018-04-20

## 2018-04-20 RX ADMIN — HEPARIN 500 UNITS: 100 SYRINGE at 13:47

## 2018-04-26 ENCOUNTER — DOCUMENTATION (OUTPATIENT)
Dept: ONCOLOGY | Facility: CLINIC | Age: 53
End: 2018-04-26

## 2018-05-03 ENCOUNTER — OFFICE VISIT (OUTPATIENT)
Dept: ONCOLOGY | Facility: CLINIC | Age: 53
End: 2018-05-03

## 2018-05-03 ENCOUNTER — TELEPHONE (OUTPATIENT)
Dept: ONCOLOGY | Facility: CLINIC | Age: 53
End: 2018-05-03

## 2018-05-03 ENCOUNTER — INFUSION (OUTPATIENT)
Dept: ONCOLOGY | Facility: HOSPITAL | Age: 53
End: 2018-05-03

## 2018-05-03 ENCOUNTER — DOCUMENTATION (OUTPATIENT)
Dept: ONCOLOGY | Facility: CLINIC | Age: 53
End: 2018-05-03

## 2018-05-03 VITALS
DIASTOLIC BLOOD PRESSURE: 71 MMHG | WEIGHT: 179 LBS | RESPIRATION RATE: 18 BRPM | HEART RATE: 93 BPM | BODY MASS INDEX: 32.94 KG/M2 | SYSTOLIC BLOOD PRESSURE: 140 MMHG | TEMPERATURE: 97.7 F | HEIGHT: 62 IN

## 2018-05-03 DIAGNOSIS — C16.9 MALIGNANT NEOPLASM OF STOMACH, UNSPECIFIED LOCATION (HCC): ICD-10-CM

## 2018-05-03 DIAGNOSIS — C16.9 MALIGNANT NEOPLASM OF STOMACH, UNSPECIFIED LOCATION (HCC): Primary | ICD-10-CM

## 2018-05-03 LAB
ALBUMIN SERPL-MCNC: 4.1 G/DL (ref 3.2–4.8)
ALBUMIN/GLOB SERPL: 1.7 G/DL (ref 1.5–2.5)
ALP SERPL-CCNC: 77 U/L (ref 25–100)
ALT SERPL W P-5'-P-CCNC: 18 U/L (ref 7–40)
ANION GAP SERPL CALCULATED.3IONS-SCNC: 8 MMOL/L (ref 3–11)
AST SERPL-CCNC: 19 U/L (ref 0–33)
BILIRUB SERPL-MCNC: 0.3 MG/DL (ref 0.3–1.2)
BUN BLD-MCNC: 15 MG/DL (ref 9–23)
BUN/CREAT SERPL: 18.8 (ref 7–25)
CALCIUM SPEC-SCNC: 8.6 MG/DL (ref 8.7–10.4)
CHLORIDE SERPL-SCNC: 110 MMOL/L (ref 99–109)
CO2 SERPL-SCNC: 24 MMOL/L (ref 20–31)
CREAT BLD-MCNC: 0.8 MG/DL (ref 0.6–1.3)
CREAT BLDA-MCNC: 0.8 MG/DL (ref 0.6–1.3)
ERYTHROCYTE [DISTWIDTH] IN BLOOD BY AUTOMATED COUNT: 26.2 % (ref 11.3–14.5)
GFR SERPL CREATININE-BSD FRML MDRD: 91 ML/MIN/1.73
GLOBULIN UR ELPH-MCNC: 2.4 GM/DL
GLUCOSE BLD-MCNC: 140 MG/DL (ref 70–100)
HCT VFR BLD AUTO: 30.6 % (ref 34.5–44)
HGB BLD-MCNC: 8.9 G/DL (ref 11.5–15.5)
LYMPHOCYTES # BLD AUTO: 1 10*3/MM3 (ref 0.6–4.8)
LYMPHOCYTES NFR BLD AUTO: 29 % (ref 24–44)
MCH RBC QN AUTO: 19.1 PG (ref 27–31)
MCHC RBC AUTO-ENTMCNC: 29.1 G/DL (ref 32–36)
MCV RBC AUTO: 65.5 FL (ref 80–99)
MONOCYTES # BLD AUTO: 0.2 10*3/MM3 (ref 0–1)
MONOCYTES NFR BLD AUTO: 6.2 % (ref 0–12)
NEUTROPHILS # BLD AUTO: 2.1 10*3/MM3 (ref 1.5–8.3)
NEUTROPHILS NFR BLD AUTO: 64.8 % (ref 41–71)
PLATELET # BLD AUTO: 311 10*3/MM3 (ref 150–450)
PMV BLD AUTO: 8.1 FL (ref 6–12)
POTASSIUM BLD-SCNC: 4.2 MMOL/L (ref 3.5–5.5)
PROT SERPL-MCNC: 6.5 G/DL (ref 5.7–8.2)
RBC # BLD AUTO: 4.67 10*6/MM3 (ref 3.89–5.14)
SODIUM BLD-SCNC: 142 MMOL/L (ref 132–146)
WBC NRBC COR # BLD: 3.3 10*3/MM3 (ref 3.5–10.8)

## 2018-05-03 PROCEDURE — 25010000002 FLUOROURACIL PER 500 MG: Performed by: INTERNAL MEDICINE

## 2018-05-03 PROCEDURE — 96375 TX/PRO/DX INJ NEW DRUG ADDON: CPT

## 2018-05-03 PROCEDURE — 25010000002 OXALIPLATIN PER 0.5 MG: Performed by: INTERNAL MEDICINE

## 2018-05-03 PROCEDURE — 96413 CHEMO IV INFUSION 1 HR: CPT

## 2018-05-03 PROCEDURE — 25010000002 PALONOSETRON PER 25 MCG: Performed by: INTERNAL MEDICINE

## 2018-05-03 PROCEDURE — 99214 OFFICE O/P EST MOD 30 MIN: CPT | Performed by: INTERNAL MEDICINE

## 2018-05-03 PROCEDURE — 25010000002 DOCETAXEL 20 MG/ML CONCENTRATION 4 ML VIAL: Performed by: INTERNAL MEDICINE

## 2018-05-03 PROCEDURE — 96415 CHEMO IV INFUSION ADDL HR: CPT

## 2018-05-03 PROCEDURE — 85025 COMPLETE CBC W/AUTO DIFF WBC: CPT

## 2018-05-03 PROCEDURE — 25010000002 DOCETAXEL 20 MG/ML CONCENTRATION 1 ML VIAL: Performed by: INTERNAL MEDICINE

## 2018-05-03 PROCEDURE — 96416 CHEMO PROLONG INFUSE W/PUMP: CPT

## 2018-05-03 PROCEDURE — 96417 CHEMO IV INFUS EACH ADDL SEQ: CPT

## 2018-05-03 PROCEDURE — 96521 REFILL/MAINT PORTABLE PUMP: CPT

## 2018-05-03 PROCEDURE — 96368 THER/DIAG CONCURRENT INF: CPT

## 2018-05-03 PROCEDURE — 82565 ASSAY OF CREATININE: CPT

## 2018-05-03 PROCEDURE — 25010000002 LEUCOVORIN CALCIUM PER 50 MG: Performed by: INTERNAL MEDICINE

## 2018-05-03 PROCEDURE — 25010000002 DEXAMETHASONE PER 1 MG: Performed by: INTERNAL MEDICINE

## 2018-05-03 PROCEDURE — 80053 COMPREHEN METABOLIC PANEL: CPT

## 2018-05-03 RX ORDER — DEXTROSE MONOHYDRATE 50 MG/ML
250 INJECTION, SOLUTION INTRAVENOUS ONCE
Status: CANCELLED | OUTPATIENT
Start: 2018-05-03

## 2018-05-03 RX ORDER — DEXTROSE MONOHYDRATE 50 MG/ML
250 INJECTION, SOLUTION INTRAVENOUS ONCE
Status: COMPLETED | OUTPATIENT
Start: 2018-05-03 | End: 2018-05-03

## 2018-05-03 RX ORDER — PALONOSETRON 0.05 MG/ML
0.25 INJECTION, SOLUTION INTRAVENOUS ONCE
Status: CANCELLED | OUTPATIENT
Start: 2018-05-03

## 2018-05-03 RX ORDER — PALONOSETRON 0.05 MG/ML
0.25 INJECTION, SOLUTION INTRAVENOUS ONCE
Status: COMPLETED | OUTPATIENT
Start: 2018-05-03 | End: 2018-05-03

## 2018-05-03 RX ADMIN — DOCETAXEL 95 MG: 80 INJECTION, SOLUTION, CONCENTRATE INTRAVENOUS at 10:12

## 2018-05-03 RX ADMIN — OXALIPLATIN 155 MG: 100 INJECTION, SOLUTION, CONCENTRATE INTRAVENOUS at 11:32

## 2018-05-03 RX ADMIN — FLUOROURACIL 4810 MG: 2.5 INJECTION, SOLUTION INTRAVENOUS at 14:03

## 2018-05-03 RX ADMIN — DEXAMETHASONE SODIUM PHOSPHATE 12 MG: 4 INJECTION, SOLUTION INTRAMUSCULAR; INTRAVENOUS at 09:50

## 2018-05-03 RX ADMIN — LEUCOVORIN CALCIUM 370 MG: 100 INJECTION, POWDER, LYOPHILIZED, FOR SOLUTION INTRAMUSCULAR; INTRAVENOUS at 11:33

## 2018-05-03 RX ADMIN — PALONOSETRON HYDROCHLORIDE 0.25 MG: 0.25 INJECTION INTRAVENOUS at 09:48

## 2018-05-03 RX ADMIN — DEXTROSE MONOHYDRATE 250 ML: 50 INJECTION, SOLUTION INTRAVENOUS at 11:32

## 2018-05-03 NOTE — TELEPHONE ENCOUNTER
----- Message from Reema Laws RN sent at 5/3/2018  9:38 AM EDT -----   Notify only for hgb 8.9---less than 9.  Thx Karolyn

## 2018-05-03 NOTE — PROGRESS NOTES
CHIEF COMPLAINT: Management of gastric carcinoma locally advanced    Problem List:     Gastric cancer    2/6/2018 Initial Diagnosis     Gastric cancer.  53-year-old female with a three-month history of indigestion and upper abdominal pain.  She was started on omeprazole for presumed reflux, symptoms were not relieved.  She underwent an EGD on 2/6/2018 with Dr. Matt that revealed a large, friable and ulcerated mass in the body of the stomach.  Biopsies returned moderately to poorly differentiated adenocarcinoma.  Staging studies including CT scan of the chest abdomen and pelvis were negative for distant disease.  She underwent diagnostic laparoscopic with peritoneal washings to complete staging on 3/13/2018.  Baseline CBC 3/9/2018 WBC 4820, hemoglobin 8.5, hematocrit 28.9%, platelet count 351,000, MCV 64.8, MCH 19.1, MCHC 29.4.  CEA normal at less than 0.50.  HER-2/erik testing from gastric body biopsy was negative.  CMP was normal other than for serum calcium slightly decreased at 8.6.           2/6/2018 Procedure     EGD revealed gastric ulcerated mass in body of stomach.  Pathology returned:   Final Diagnosis    1. GASTRIC BODY BIOPSY:  Invasive moderate to poorly differentiated adenocarcinoma with ulceration (see comment).   2. GASTRIC ANTRUM BIOPSY:  Minimal chronic gastritis without activity.  No intestinal metaplasia or dysplasia identified.  No Helicobacter pylori-like organisms identified on routine stains.   HER2/erik testing negative  FLUORESCENCE IN-SITU HYBRIDIZATION (FISH) REPORT:  Negative/Not Amplified  HER2/WILL-17 Ratio: 1.3           2/9/2018 Imaging     CT abdomen/pelvis IMPRESSION:     There is mild thickening and prominence of the mid gastric wall of the  body of the stomach, middle third.     Extragastric mass is not identified. The lesser sac and retrogastric  spaces are clear.     Visceral tumor, adenopathy, stranding or caking is not currently  identified as described. There is no  "retroperitoneal or retrogastric  adenopathy. The lower lung zones are clear with no evidence of reactive  pleural effusion or occult mass. There is no liver metastasis and no  other acute focal CT abnormality is identified within the abdomen or  pelvis.     Incidental note is made of a nonobstructing stone right kidney right  lower pole.         3/9/2018 Imaging     CT Chest IMPRESSION:  No acute intrathoracic abnormality. No definite evidence of  metastatic disease.         3/13/2018 Procedure     Diagnostic laproscopy with biopsy and lysis of adhesions.  Pathology returned:  Final Diagnosis   PERITONEAL NODULE, EXCISIONAL BIOPSY:  Benign fibrotic nodule. (See comment)  JFJ/klb    Electronically signed by Boaz Jerez MD on 3/15/2018 at 1624   Comment See result below    The biopsy shows a lobulated fibrotic nodule with some intermixed lymphoid cells. The nodule does not appear neoplastic. One cannot entirely exclude a \"burned out\" lymph node, or other implant. There is no evidence of carcinoma.      Abdominal wall washings benign:  Final Diagnosis    1. ABDOMINAL WASH, WALL:  Negative for malignancy.  Scant cellularity; chronic inflammatory cells and scattered benign mesothelial cells.   2. ABDOMINAL WASH, WALL:  Negative for malignancy.  Scant cellularity; chronic inflammatory cells and scattered benign mesothelial cells.   3. ABDOMINAL WASH, WALL:  Negative for malignancy.  Benign mesothelial cells and mixed inflammatory cells.                   HISTORY OF PRESENT ILLNESS:  The patient is a 53 y.o. female, here for follow up on management of Locally advanced gastric carcinoma status post 2 courses of FLOT.  Hemoglobin in the eights.  Some nausea controlled with antiemetics.  No palmar or plantar desquamation.  No diarrhea or mucositis.      Past Medical History:   Diagnosis Date   • Abdominal pain    • Acid reflux    • Gastric cancer    • Gastric ulcer    • Wears glasses      Past Surgical History:   Procedure " "Laterality Date   • CERVICAL BIOPSY  W/ LOOP ELECTRODE EXCISION     • DIAGNOSTIC LAPAROSCOPY N/A 3/13/2018    Procedure: DIAGNOSTIC LAPAROSCOPY WITH BIOPSY, LYSIS OF ADHESIONS;  Surgeon: Nicole Crooks MD;  Location: [facility];  Service: General   • ENDOSCOPY     • TUBAL ABDOMINAL LIGATION     • VENOUS ACCESS DEVICE (PORT) INSERTION         No Known Allergies    Family History and Social History reviewed and changed as necessary      REVIEW OF SYSTEM:   Review of Systems   Constitutional: Negative for appetite change, chills, diaphoresis, fatigue, fever and unexpected weight change.   HENT:   Negative for mouth sores, sore throat and trouble swallowing.    Eyes: Negative for icterus.   Respiratory: Negative for cough, hemoptysis and shortness of breath.    Cardiovascular: Negative for chest pain, leg swelling and palpitations.   Gastrointestinal: Negative for abdominal distention, abdominal pain, blood in stool, constipation, diarrhea, nausea and vomiting.   Endocrine: Negative for hot flashes.   Genitourinary: Negative for bladder incontinence, difficulty urinating, dysuria, frequency and hematuria.    Musculoskeletal: Negative for gait problem, neck pain and neck stiffness.   Skin: Negative for rash.   Neurological: Negative for dizziness, gait problem, headaches, light-headedness and numbness.   Hematological: Negative for adenopathy. Does not bruise/bleed easily.   Psychiatric/Behavioral: Negative for depression. The patient is not nervous/anxious.    All other systems reviewed and are negative.       PHYSICAL EXAM    Vitals:    05/03/18 0841   BP: 140/71   Pulse: 93   Resp: 18   Temp: 97.7 °F (36.5 °C)   Weight: 81.2 kg (179 lb)   Height: 157.5 cm (62\")     Constitutional: Appears well-developed and well-nourished. No distress.   ECOG: (1) Restricted in physically strenuous activity, ambulatory and able to do work of light nature  HENT:   Head: Normocephalic.   Mouth/Throat: Oropharynx is clear and moist. "   Eyes: Conjunctivae are normal. Pupils are equal, round, and reactive to light. No scleral icterus.   Neck: Neck supple. No JVD present. No thyromegaly present.   Cardiovascular: Normal rate, regular rhythm and normal heart sounds.    Pulmonary/Chest: Breath sounds normal. No respiratory distress.   Abdominal: Soft. Exhibits no distension and no mass. There is no hepatosplenomegaly. There is no tenderness. There is no rebound and no guarding.   Musculoskeletal:Exhibits no edema, tenderness or deformity.   Neurological: Alert and oriented to person, place, and time. Exhibits normal muscle tone.   Skin: No ecchymosis, no petechiae and no rash noted. Not diaphoretic. No cyanosis. Nails show no clubbing.   Psychiatric: Normal mood and affect.   Vitals reviewed.      No radiology results for the last 7 days          ASSESSMENT & PLAN:    1.  Gastric carcinoma locally advanced  2. Anemia due to problem #1  3. Nausea controlled with antiemetics.    Discussion: I will give her course #3 of KODI T and after course #4 we will reimage him assuming a good response we will get her back to Dr. Crooks for possible resection followed by 4 more courses of FL OT.  We'll continue her antiemetics.  We'll transfuse as need be but her last hemoglobin actually was a little higher in the eights where it had been running in the sevens but we'll see where it is today.      Kaden Oswald MD    05/03/2018

## 2018-05-04 ENCOUNTER — INFUSION (OUTPATIENT)
Dept: ONCOLOGY | Facility: HOSPITAL | Age: 53
End: 2018-05-04

## 2018-05-04 VITALS
DIASTOLIC BLOOD PRESSURE: 74 MMHG | BODY MASS INDEX: 32.76 KG/M2 | TEMPERATURE: 98.1 F | WEIGHT: 178 LBS | SYSTOLIC BLOOD PRESSURE: 121 MMHG | HEART RATE: 77 BPM | HEIGHT: 62 IN | RESPIRATION RATE: 16 BRPM

## 2018-05-04 DIAGNOSIS — C16.9 MALIGNANT NEOPLASM OF STOMACH, UNSPECIFIED LOCATION (HCC): Primary | ICD-10-CM

## 2018-05-04 PROCEDURE — 96523 IRRIG DRUG DELIVERY DEVICE: CPT

## 2018-05-04 PROCEDURE — 25010000002 HEPARIN FLUSH (PORCINE) 100 UNIT/ML SOLUTION: Performed by: INTERNAL MEDICINE

## 2018-05-04 RX ORDER — SODIUM CHLORIDE 0.9 % (FLUSH) 0.9 %
10 SYRINGE (ML) INJECTION AS NEEDED
Status: CANCELLED | OUTPATIENT
Start: 2018-05-04

## 2018-05-04 RX ADMIN — HEPARIN 500 UNITS: 100 SYRINGE at 14:03

## 2018-05-09 ENCOUNTER — DOCUMENTATION (OUTPATIENT)
Dept: GENETICS | Facility: HOSPITAL | Age: 53
End: 2018-05-09

## 2018-05-09 NOTE — PROGRESS NOTES
Astrid Nobles, a 53-year-old female, was referred for genetic counseling due to a personal history of gastric cancer. Ms. Nobles was recently diagnosed with gastric cancer at age 53, and is currently undergoing perioperative chemotherapy. Ms. Nobles retains her uterus and ovaries. Ms. Nobles was interested in discussing her risk for a hereditary cancer syndrome given her personal and family history.  Ms. Nobles was interested in pursuing a multi-gene panel to evaluate her risk of a hereditary cancer syndrome, therefore the CancerNext panel was ordered through [employer] which analyzes 34 genes associated with an increased cancer risk. The genes on this panel include APC, INGRID, BARD1, BMPR1A, BRCA1, BRCA2, BRIP1, CDH1, CDK4, CDKN2A, CHEK2, DICER1, EPCAM, GREM1, HOXB13, MLH1, MRE11A, MSH2, MSH6, MUTYH, NBN, NF1, PALB2, PMS2, POLD1, POLE, PTEN, RAD50, RAD51C, RAD51D, SMAD4, SMARCA4, STK11, and TP53. Genetic testing was negative by sequencing and rearrangement testing for deleterious mutations in the 34 genes included on the CancerNext panel. These normal results were discussed with Ms. Nobles by telephone on 5/9/18.    PERTINENT FAMILY HISTORY: (See attached pedigree)   Sister:  Gastric cancer, 42  Mat. Aunt: Breast cancer  Mat. Aunt: Ovarian cancer    We do not have medical records regarding any of these diagnoses.      RISK ASSESSMENT:  Ms. Nobles’s personal and family history raises the question of a hereditary cancer syndrome. We discussed genetic testing and the option of pursuing a panel that would test for genes known to impact cancer risk that would include the CDH1 gene, as well as BRCA1/2.   Ms. Nobles clearly meets NCCN guidelines criteria for BRCA1/2 testing based on family history, and consideration of CDH1 testing given personal and family history.     This testing was negative for deleterious mutations in Ms. Nobles, reducing the likelihood for her to have a hereditary cancer syndrome. Based on  computer modeling, Ms. Nobles’s lifetime risk for breast cancer was estimated to be up to 10.2% (Manju/LANNY), which is not elevated over the general population risk of 12.5%.  Per NCCN guidelines, a risk greater than 20% is considered high risk and warrants increased screening; Ms. Duke risk does not fall into this category.  This risk assessment is based on the information that was provided during the session and may change if new information is obtained.    GENETIC COUNSELING (30 minutes):  We reviewed the family history information in detail. Cases of cancer follow three general patterns: sporadic, familial, and hereditary.  While most cancer is sporadic, some cases appear to occur in family clusters.  These cases are said to be familial and account for 10-20% of cancer cases.  Familial cases may be due to a combination of shared genes and environmental factors among family members.  In even fewer families, the cancer is said to be inherited, and the genes responsible for the cancer are known.      Family histories typical of hereditary cancer syndromes usually include multiple first- and second-degree relatives diagnosed with cancer types that define a syndrome.  These cases tend to be diagnosed at younger-than-expected ages and can be bilateral or multifocal.  The cancer in these families follows an autosomal dominant inheritance pattern, which indicates the likely presence of a mutation in a cancer susceptibility gene.  Children and siblings of an individual believed to carry this mutation have a 50% chance of inheriting that mutation, thereby inheriting the increased risk to develop cancer.  These mutations can be passed down from the maternal or the paternal lineage.    We discussed that hereditary diffuse gastric cancer (HDGC) is an autosomal dominant syndrome characterized by the development of gastric cancer, primarily the diffuse type, at a young age. HDGC has been associated with mutations in  the CDH1 gene.  Women with HDGC have as high as an 83% lifetime risk for gastric cancer.  In addition, women have as high as a 39% risk for lobular breast cancer.    Given the family history of breast and ovarian cancer, we discussed that hereditary breast cancer accounts for 5-10% of all cases of breast cancer.  A significant proportion of hereditary breast and ovarian cancer can be attributed to mutations in the BRCA1 and BRCA2 genes.  Mutations in these genes confer an increased risk for breast cancer, ovarian cancer, male breast cancer, prostate cancer, and pancreatic cancer. Women with a BRCA1 or BRCA2 mutation have up to an 87% lifetime risk of breast cancer and up to a 44% risk of ovarian cancer.      There are other, more rare, hereditary cancer syndromes. Some of these conditions have well defined cancer risks and established management guidelines.  Other genes that can be tested for have been more recently described, and there may be less data regarding the risks and therefore may not have established management guidelines.  We discussed these limitations at length. Based on Ms. Nobles’s personal and family history and her desire to get more information regarding her personal risks, she opted to pursue testing through a panel evaluating several other genes known to increase the risk for cancer.    GENETIC TESTING:  The risks, benefits and limitations of genetic testing and implications for clinical management following testing were reviewed.  DNA test results can influence decisions regarding screening, prevention and surgical management.  Genetic testing can have significant psychological implications for both individuals and families.  Also discussed was the possibility of employment and insurance discrimination based on genetic test results and the laws in place to prevent this.    We discussed panel testing, which would involve testing for CDH1, BRCA1/2, as well as 31 additional genes that are  associated with increased cancer risk. The benefits and limitations of genetic testing were discussed and Ms. Nobles decided to pursue testing via the panel. The implications of a positive or negative test result were discussed. We discussed the possibility that, in some cases, genetic test results may be informative or may be ambiguous due to the identification of a genetic variant. These variants may or may not be associated with an increased cancer risk.  With multigene panel testing, it is not uncommon for a variant of uncertain significance (VUS) to be identified.  If a VUS is identified, testing family members is typically not recommended and screening recommendations are made based on the family history.  The laboratories that perform genetic testing work to reclassify the VUS and send out an amended report if and when a VUS is reclassified.  The majority of variant findings are ultimately reclassified to a negative result.  Given her personal and family history, a negative test result would not eliminate all cancer risk to her relatives, although the risk would not be as high as it would with positive genetic testing.      TEST RESULTS: Genetic testing was negative for known deleterious mutations by sequencing and rearrangement testing for the genes on the CancerNext panel (see attached results).  This negative result greatly lowers, but does not eliminate, the risk of a hereditary cancer syndrome for Ms. Nobles. This assessment is based on the information provided at the time of the consultation.    CLINICAL MANAGEMENT GUIDELINES: Ms. Nobles’s management should be determined by her oncology team. Given the family history of gastric cancer, it is encouraged that Ms. Nobles’s close relatives discuss the family history with their gastroenterologist to guide management moving forward. While there are currently no standard guidelines outlining recommended screening for gastric cancer, some guidelines have  proposed screening when there is a known family history. This may include screening with EGD at age 50 or ten years prior to the age of youngest diagnosis in the first-degree relative, whichever is earliest, and repeat every one to two years.     Options available to individuals with an elevated lifetime risk for breast and/or ovarian cancer were briefly discussed given the family history, including increased surveillance, chemoprevention and prophylactic surgery (mastectomy and/or oophorectomy).  Based on computer modeling, Ms. Nobles’s lifetime risk for breast cancer would not be considered “high risk”.  She should follow general population screening guidelines for her breast cancer risk, including annual clinical breast exam, annual mammography, and self-breast exam.      PLAN: Genetic counseling remains available to Ms. Nobles.  If Ms. Nobles has any questions, concerns, or updates to the family history she is welcome to call us at [phone].      Nelly Bond MS, [facility] [place]  Licensed Certified Genetic Counselor     Cc: Astrid PandyaMD Kaden Perry MD Gregory Woolfolk, MD Edward Clarke Standiford, MD

## 2018-05-15 DIAGNOSIS — C16.9 MALIGNANT NEOPLASM OF STOMACH, UNSPECIFIED LOCATION (HCC): ICD-10-CM

## 2018-05-15 RX ORDER — DEXTROSE MONOHYDRATE 50 MG/ML
250 INJECTION, SOLUTION INTRAVENOUS ONCE
Status: CANCELLED | OUTPATIENT
Start: 2018-05-17

## 2018-05-15 RX ORDER — PALONOSETRON 0.05 MG/ML
0.25 INJECTION, SOLUTION INTRAVENOUS ONCE
Status: CANCELLED | OUTPATIENT
Start: 2018-05-17

## 2018-05-17 ENCOUNTER — OFFICE VISIT (OUTPATIENT)
Dept: ONCOLOGY | Facility: CLINIC | Age: 53
End: 2018-05-17

## 2018-05-17 ENCOUNTER — DOCUMENTATION (OUTPATIENT)
Dept: ONCOLOGY | Facility: CLINIC | Age: 53
End: 2018-05-17

## 2018-05-17 ENCOUNTER — INFUSION (OUTPATIENT)
Dept: ONCOLOGY | Facility: HOSPITAL | Age: 53
End: 2018-05-17

## 2018-05-17 VITALS
RESPIRATION RATE: 16 BRPM | HEIGHT: 62 IN | SYSTOLIC BLOOD PRESSURE: 135 MMHG | DIASTOLIC BLOOD PRESSURE: 81 MMHG | BODY MASS INDEX: 33.31 KG/M2 | HEART RATE: 80 BPM | TEMPERATURE: 99.2 F | WEIGHT: 181 LBS

## 2018-05-17 DIAGNOSIS — C16.9 MALIGNANT NEOPLASM OF STOMACH, UNSPECIFIED LOCATION (HCC): Primary | ICD-10-CM

## 2018-05-17 LAB
ALBUMIN SERPL-MCNC: 4.2 G/DL (ref 3.2–4.8)
ALBUMIN/GLOB SERPL: 1.8 G/DL (ref 1.5–2.5)
ALP SERPL-CCNC: 83 U/L (ref 25–100)
ALT SERPL W P-5'-P-CCNC: 17 U/L (ref 7–40)
ANION GAP SERPL CALCULATED.3IONS-SCNC: 9 MMOL/L (ref 3–11)
AST SERPL-CCNC: 20 U/L (ref 0–33)
BILIRUB SERPL-MCNC: 0.4 MG/DL (ref 0.3–1.2)
BUN BLD-MCNC: 16 MG/DL (ref 9–23)
BUN/CREAT SERPL: 20 (ref 7–25)
CALCIUM SPEC-SCNC: 9 MG/DL (ref 8.7–10.4)
CHLORIDE SERPL-SCNC: 109 MMOL/L (ref 99–109)
CO2 SERPL-SCNC: 23 MMOL/L (ref 20–31)
CREAT BLD-MCNC: 0.8 MG/DL (ref 0.6–1.3)
CREAT BLDA-MCNC: 0.7 MG/DL (ref 0.6–1.3)
ERYTHROCYTE [DISTWIDTH] IN BLOOD BY AUTOMATED COUNT: 27.2 % (ref 11.3–14.5)
GFR SERPL CREATININE-BSD FRML MDRD: 91 ML/MIN/1.73
GLOBULIN UR ELPH-MCNC: 2.4 GM/DL
GLUCOSE BLD-MCNC: 147 MG/DL (ref 70–100)
HCT VFR BLD AUTO: 31.4 % (ref 34.5–44)
HGB BLD-MCNC: 9.1 G/DL (ref 11.5–15.5)
LYMPHOCYTES # BLD AUTO: 1.1 10*3/MM3 (ref 0.6–4.8)
LYMPHOCYTES NFR BLD AUTO: 34.7 % (ref 24–44)
MCH RBC QN AUTO: 18.9 PG (ref 27–31)
MCHC RBC AUTO-ENTMCNC: 28.9 G/DL (ref 32–36)
MCV RBC AUTO: 65.4 FL (ref 80–99)
MONOCYTES # BLD AUTO: 0.2 10*3/MM3 (ref 0–1)
MONOCYTES NFR BLD AUTO: 7 % (ref 0–12)
NEUTROPHILS # BLD AUTO: 1.8 10*3/MM3 (ref 1.5–8.3)
NEUTROPHILS NFR BLD AUTO: 58.3 % (ref 41–71)
PLATELET # BLD AUTO: 275 10*3/MM3 (ref 150–450)
PMV BLD AUTO: 8.3 FL (ref 6–12)
POTASSIUM BLD-SCNC: 4.7 MMOL/L (ref 3.5–5.5)
PROT SERPL-MCNC: 6.6 G/DL (ref 5.7–8.2)
RBC # BLD AUTO: 4.79 10*6/MM3 (ref 3.89–5.14)
SODIUM BLD-SCNC: 141 MMOL/L (ref 132–146)
WBC NRBC COR # BLD: 3.1 10*3/MM3 (ref 3.5–10.8)

## 2018-05-17 PROCEDURE — 96415 CHEMO IV INFUSION ADDL HR: CPT

## 2018-05-17 PROCEDURE — 96413 CHEMO IV INFUSION 1 HR: CPT

## 2018-05-17 PROCEDURE — 25010000002 DEXAMETHASONE PER 1 MG: Performed by: INTERNAL MEDICINE

## 2018-05-17 PROCEDURE — 85025 COMPLETE CBC W/AUTO DIFF WBC: CPT

## 2018-05-17 PROCEDURE — 99214 OFFICE O/P EST MOD 30 MIN: CPT | Performed by: INTERNAL MEDICINE

## 2018-05-17 PROCEDURE — 25010000002 LEUCOVORIN 200 MG RECONSTITUTED SOLUTION 1 EACH VIAL: Performed by: INTERNAL MEDICINE

## 2018-05-17 PROCEDURE — 96549 UNLISTED CHEMOTHERAPY PX: CPT

## 2018-05-17 PROCEDURE — 96375 TX/PRO/DX INJ NEW DRUG ADDON: CPT

## 2018-05-17 PROCEDURE — 96417 CHEMO IV INFUS EACH ADDL SEQ: CPT

## 2018-05-17 PROCEDURE — 25010000002 PALONOSETRON PER 25 MCG: Performed by: INTERNAL MEDICINE

## 2018-05-17 PROCEDURE — 96368 THER/DIAG CONCURRENT INF: CPT

## 2018-05-17 PROCEDURE — 82565 ASSAY OF CREATININE: CPT

## 2018-05-17 PROCEDURE — 96416 CHEMO PROLONG INFUSE W/PUMP: CPT

## 2018-05-17 PROCEDURE — 25010000002 DOCETAXEL 20 MG/ML CONCENTRATION 1 ML VIAL: Performed by: INTERNAL MEDICINE

## 2018-05-17 PROCEDURE — 25010000002 OXALIPLATIN PER 0.5 MG: Performed by: INTERNAL MEDICINE

## 2018-05-17 PROCEDURE — 25010000002 DOCETAXEL 20 MG/ML CONCENTRATION 4 ML VIAL: Performed by: INTERNAL MEDICINE

## 2018-05-17 PROCEDURE — 80053 COMPREHEN METABOLIC PANEL: CPT

## 2018-05-17 PROCEDURE — 25010000002 FLUOROURACIL PER 500 MG: Performed by: INTERNAL MEDICINE

## 2018-05-17 RX ORDER — DEXTROSE MONOHYDRATE 50 MG/ML
250 INJECTION, SOLUTION INTRAVENOUS ONCE
Status: DISCONTINUED | OUTPATIENT
Start: 2018-05-17 | End: 2018-05-17 | Stop reason: HOSPADM

## 2018-05-17 RX ORDER — SODIUM CHLORIDE 0.9 % (FLUSH) 0.9 %
10 SYRINGE (ML) INJECTION AS NEEDED
Status: DISCONTINUED | OUTPATIENT
Start: 2018-05-17 | End: 2018-05-17 | Stop reason: HOSPADM

## 2018-05-17 RX ORDER — OLANZAPINE 10 MG/1
10 TABLET ORAL NIGHTLY
Qty: 30 TABLET | Refills: 5 | Status: SHIPPED | OUTPATIENT
Start: 2018-05-17 | End: 2018-08-08

## 2018-05-17 RX ORDER — PALONOSETRON 0.05 MG/ML
0.25 INJECTION, SOLUTION INTRAVENOUS ONCE
Status: COMPLETED | OUTPATIENT
Start: 2018-05-17 | End: 2018-05-17

## 2018-05-17 RX ORDER — SODIUM CHLORIDE 0.9 % (FLUSH) 0.9 %
10 SYRINGE (ML) INJECTION AS NEEDED
Status: CANCELLED | OUTPATIENT
Start: 2018-05-17

## 2018-05-17 RX ADMIN — DEXAMETHASONE SODIUM PHOSPHATE 12 MG: 4 INJECTION, SOLUTION INTRAMUSCULAR; INTRAVENOUS at 09:38

## 2018-05-17 RX ADMIN — LEUCOVORIN CALCIUM 370 MG: 200 INJECTION, POWDER, LYOPHILIZED, FOR SOLUTION INTRAMUSCULAR; INTRAVENOUS at 11:11

## 2018-05-17 RX ADMIN — OXALIPLATIN 155 MG: 100 INJECTION, SOLUTION, CONCENTRATE INTRAVENOUS at 11:11

## 2018-05-17 RX ADMIN — DOCETAXEL 95 MG: 80 INJECTION, SOLUTION, CONCENTRATE INTRAVENOUS at 10:05

## 2018-05-17 RX ADMIN — FLUOROURACIL 4810 MG: 50 INJECTION, SOLUTION INTRAVENOUS at 13:26

## 2018-05-17 RX ADMIN — PALONOSETRON HYDROCHLORIDE 0.25 MG: 0.25 INJECTION INTRAVENOUS at 10:00

## 2018-05-17 NOTE — PROGRESS NOTES
CHIEF COMPLAINT: Follow-up gastric carcinoma status post FLOT ×4    Problem List:     Gastric cancer    2/6/2018 Initial Diagnosis     Gastric cancer.  53-year-old female with a three-month history of indigestion and upper abdominal pain.  She was started on omeprazole for presumed reflux, symptoms were not relieved.  She underwent an EGD on 2/6/2018 with Dr. Matt that revealed a large, friable and ulcerated mass in the body of the stomach.  Biopsies returned moderately to poorly differentiated adenocarcinoma.  Staging studies including CT scan of the chest abdomen and pelvis were negative for distant disease.  She underwent diagnostic laparoscopic with peritoneal washings to complete staging on 3/13/2018.  Baseline CBC 3/9/2018 WBC 4820, hemoglobin 8.5, hematocrit 28.9%, platelet count 351,000, MCV 64.8, MCH 19.1, MCHC 29.4.  CEA normal at less than 0.50.  HER-2/erik testing from gastric body biopsy was negative.  CMP was normal other than for serum calcium slightly decreased at 8.6.           2/6/2018 Procedure     EGD revealed gastric ulcerated mass in body of stomach.  Pathology returned:   Final Diagnosis    1. GASTRIC BODY BIOPSY:  Invasive moderate to poorly differentiated adenocarcinoma with ulceration (see comment).   2. GASTRIC ANTRUM BIOPSY:  Minimal chronic gastritis without activity.  No intestinal metaplasia or dysplasia identified.  No Helicobacter pylori-like organisms identified on routine stains.   HER2/erik testing negative  FLUORESCENCE IN-SITU HYBRIDIZATION (FISH) REPORT:  Negative/Not Amplified  HER2/WILL-17 Ratio: 1.3           2/9/2018 Imaging     CT abdomen/pelvis IMPRESSION:     There is mild thickening and prominence of the mid gastric wall of the  body of the stomach, middle third.     Extragastric mass is not identified. The lesser sac and retrogastric  spaces are clear.     Visceral tumor, adenopathy, stranding or caking is not currently  identified as described. There is no  "retroperitoneal or retrogastric  adenopathy. The lower lung zones are clear with no evidence of reactive  pleural effusion or occult mass. There is no liver metastasis and no  other acute focal CT abnormality is identified within the abdomen or  pelvis.     Incidental note is made of a nonobstructing stone right kidney right  lower pole.         3/9/2018 Imaging     CT Chest IMPRESSION:  No acute intrathoracic abnormality. No definite evidence of  metastatic disease.         3/13/2018 Procedure     Diagnostic laproscopy with biopsy and lysis of adhesions.  Pathology returned:  Final Diagnosis   PERITONEAL NODULE, EXCISIONAL BIOPSY:  Benign fibrotic nodule. (See comment)  JFJ/klb    Electronically signed by Boaz Jerez MD on 3/15/2018 at 1624   Comment See result below    The biopsy shows a lobulated fibrotic nodule with some intermixed lymphoid cells. The nodule does not appear neoplastic. One cannot entirely exclude a \"burned out\" lymph node, or other implant. There is no evidence of carcinoma.      Abdominal wall washings benign:  Final Diagnosis    1. ABDOMINAL WASH, WALL:  Negative for malignancy.  Scant cellularity; chronic inflammatory cells and scattered benign mesothelial cells.   2. ABDOMINAL WASH, WALL:  Negative for malignancy.  Scant cellularity; chronic inflammatory cells and scattered benign mesothelial cells.   3. ABDOMINAL WASH, WALL:  Negative for malignancy.  Benign mesothelial cells and mixed inflammatory cells.                5/9/2018 Genetic Testing     Genetic testing negative for BreastNext panel            HISTORY OF PRESENT ILLNESS:  The patient is a 53 y.o. female, here for follow up on management of Gastric carcinoma.  She is here today for course #4 of FLOT chemotherapy.  She has had protracted nausea for for 5 days following the last couple of courses and this despite Zofran and dexamethasone.  No emesis.  Some fatigue.  No significant neuropathy.      Past Medical History: " "  Diagnosis Date   • Abdominal pain    • Acid reflux    • Gastric cancer    • Gastric ulcer    • Wears glasses      Past Surgical History:   Procedure Laterality Date   • CERVICAL BIOPSY  W/ LOOP ELECTRODE EXCISION     • DIAGNOSTIC LAPAROSCOPY N/A 3/13/2018    Procedure: DIAGNOSTIC LAPAROSCOPY WITH BIOPSY, LYSIS OF ADHESIONS;  Surgeon: Nicole Crooks MD;  Location: [facility];  Service: General   • ENDOSCOPY     • TUBAL ABDOMINAL LIGATION     • VENOUS ACCESS DEVICE (PORT) INSERTION         No Known Allergies    Family History and Social History reviewed and changed as necessary      REVIEW OF SYSTEM:   Review of Systems   Constitutional: Negative for appetite change, chills, diaphoresis, fatigue, fever and unexpected weight change.   HENT:   Negative for mouth sores, sore throat and trouble swallowing.    Eyes: Negative for icterus.   Respiratory: Negative for cough, hemoptysis and shortness of breath.    Cardiovascular: Negative for chest pain, leg swelling and palpitations.   Gastrointestinal: Negative for abdominal distention, abdominal pain, blood in stool, constipation, diarrhea, nausea and vomiting.   Endocrine: Negative for hot flashes.   Genitourinary: Negative for bladder incontinence, difficulty urinating, dysuria, frequency and hematuria.    Musculoskeletal: Negative for gait problem, neck pain and neck stiffness.   Skin: Negative for rash.   Neurological: Negative for dizziness, gait problem, headaches, light-headedness and numbness.   Hematological: Negative for adenopathy. Does not bruise/bleed easily.   Psychiatric/Behavioral: Negative for depression. The patient is not nervous/anxious.    All other systems reviewed and are negative.       PHYSICAL EXAM    Vitals:    05/17/18 0816   BP: 135/81   Pulse: 80   Resp: 16   Temp: 99.2 °F (37.3 °C)   Weight: 82.1 kg (181 lb)   Height: 157.5 cm (62\")     Constitutional: Appears well-developed and well-nourished. No distress.   ECOG: (1) Restricted in " physically strenuous activity, ambulatory and able to do work of light nature  HENT:   Head: Normocephalic.  Alopecic  Mouth/Throat: Oropharynx is clear and moist.   Eyes: Conjunctivae are normal. Pupils are equal, round, and reactive to light. No scleral icterus.   Neck: Neck supple. No JVD present. No thyromegaly present.   Cardiovascular: Normal rate, regular rhythm and normal heart sounds.    Pulmonary/Chest: Breath sounds normal. No respiratory distress.   Abdominal: Soft. Exhibits no distension and no mass. There is no hepatosplenomegaly. There is no tenderness. There is no rebound and no guarding.   Musculoskeletal:Exhibits no edema, tenderness or deformity.   Neurological: Alert and oriented to person, place, and time. Exhibits normal muscle tone.   Skin: No ecchymosis, no petechiae and no rash noted. Not diaphoretic. No cyanosis. Nails show no clubbing.   Psychiatric: Normal mood and affect.   Vitals reviewed.      No radiology results for the last 7 days          ASSESSMENT & PLAN:    1.  Gastric carcinoma  2. Protracted nausea    Discussion: I will add olanzapine to her dexamethasone and Zofran.  I will get her CAT scans at this junction and get her back to Dr. Crooks.  I'll see her in a week to go over the CAT scans to insure that this is not metastasized and assuming that is the case and especially if it shows evidence of regression, then we will proceed with surgery.  After surgery we will go to 4 more courses of adjuvant FLOT.  Discussed with patient 25 minutes greater than 50% spent counseling.      Kaden Oswald MD    05/17/2018

## 2018-05-18 ENCOUNTER — INFUSION (OUTPATIENT)
Dept: ONCOLOGY | Facility: HOSPITAL | Age: 53
End: 2018-05-18

## 2018-05-18 VITALS
TEMPERATURE: 97.9 F | RESPIRATION RATE: 16 BRPM | HEART RATE: 89 BPM | DIASTOLIC BLOOD PRESSURE: 79 MMHG | SYSTOLIC BLOOD PRESSURE: 115 MMHG | HEIGHT: 62 IN | BODY MASS INDEX: 32.94 KG/M2 | WEIGHT: 179 LBS

## 2018-05-18 DIAGNOSIS — C16.9 MALIGNANT NEOPLASM OF STOMACH, UNSPECIFIED LOCATION (HCC): Primary | ICD-10-CM

## 2018-05-18 PROCEDURE — 96523 IRRIG DRUG DELIVERY DEVICE: CPT

## 2018-05-18 PROCEDURE — 25010000002 HEPARIN FLUSH (PORCINE) 100 UNIT/ML SOLUTION: Performed by: INTERNAL MEDICINE

## 2018-05-18 RX ORDER — SODIUM CHLORIDE 0.9 % (FLUSH) 0.9 %
10 SYRINGE (ML) INJECTION AS NEEDED
Status: CANCELLED | OUTPATIENT
Start: 2018-05-18

## 2018-05-18 RX ADMIN — SODIUM CHLORIDE, PRESERVATIVE FREE 500 UNITS: 5 INJECTION INTRAVENOUS at 13:44

## 2018-05-22 ENCOUNTER — HOSPITAL ENCOUNTER (OUTPATIENT)
Dept: CT IMAGING | Facility: HOSPITAL | Age: 53
Discharge: HOME | End: 2018-05-22
Attending: INTERNAL MEDICINE | Admitting: INTERNAL MEDICINE

## 2018-05-22 DIAGNOSIS — C16.9 MALIGNANT NEOPLASM OF STOMACH, UNSPECIFIED LOCATION (HCC): ICD-10-CM

## 2018-05-22 PROCEDURE — 74177 CT ABD & PELVIS W/CONTRAST: CPT

## 2018-05-22 PROCEDURE — 71260 CT THORAX DX C+: CPT

## 2018-05-22 PROCEDURE — 25010000002 IOPAMIDOL 61 % SOLUTION: Performed by: INTERNAL MEDICINE

## 2018-05-22 RX ADMIN — BARIUM SULFATE 450 ML: 21 SUSPENSION ORAL at 12:10

## 2018-05-22 RX ADMIN — IOPAMIDOL 95 ML: 612 INJECTION, SOLUTION INTRAVENOUS at 13:55

## 2018-05-25 ENCOUNTER — OFFICE VISIT (OUTPATIENT)
Dept: ONCOLOGY | Facility: CLINIC | Age: 53
End: 2018-05-25

## 2018-05-25 VITALS
DIASTOLIC BLOOD PRESSURE: 76 MMHG | HEART RATE: 90 BPM | BODY MASS INDEX: 33.13 KG/M2 | RESPIRATION RATE: 18 BRPM | SYSTOLIC BLOOD PRESSURE: 135 MMHG | HEIGHT: 62 IN | WEIGHT: 180 LBS | TEMPERATURE: 98.7 F

## 2018-05-25 DIAGNOSIS — C16.9 MALIGNANT NEOPLASM OF STOMACH, UNSPECIFIED LOCATION (HCC): Primary | ICD-10-CM

## 2018-05-25 PROCEDURE — 99214 OFFICE O/P EST MOD 30 MIN: CPT | Performed by: INTERNAL MEDICINE

## 2018-05-25 NOTE — PROGRESS NOTES
CHIEF COMPLAINT: Gastric carcinoma    Problem List:     Gastric cancer    2/6/2018 Initial Diagnosis     Gastric cancer.  53-year-old female with a three-month history of indigestion and upper abdominal pain.  She was started on omeprazole for presumed reflux, symptoms were not relieved.  She underwent an EGD on 2/6/2018 with Dr. Matt that revealed a large, friable and ulcerated mass in the body of the stomach.  Biopsies returned moderately to poorly differentiated adenocarcinoma.  Staging studies including CT scan of the chest abdomen and pelvis were negative for distant disease.  She underwent diagnostic laparoscopic with peritoneal washings to complete staging on 3/13/2018.  Baseline CBC 3/9/2018 WBC 4820, hemoglobin 8.5, hematocrit 28.9%, platelet count 351,000, MCV 64.8, MCH 19.1, MCHC 29.4.  CEA normal at less than 0.50.  HER-2/erik testing from gastric body biopsy was negative.  CMP was normal other than for serum calcium slightly decreased at 8.6.           2/6/2018 Procedure     EGD revealed gastric ulcerated mass in body of stomach.  Pathology returned:   Final Diagnosis    1. GASTRIC BODY BIOPSY:  Invasive moderate to poorly differentiated adenocarcinoma with ulceration (see comment).   2. GASTRIC ANTRUM BIOPSY:  Minimal chronic gastritis without activity.  No intestinal metaplasia or dysplasia identified.  No Helicobacter pylori-like organisms identified on routine stains.   HER2/erik testing negative  FLUORESCENCE IN-SITU HYBRIDIZATION (FISH) REPORT:  Negative/Not Amplified  HER2/WILL-17 Ratio: 1.3           2/9/2018 Imaging     CT abdomen/pelvis IMPRESSION:     There is mild thickening and prominence of the mid gastric wall of the  body of the stomach, middle third.     Extragastric mass is not identified. The lesser sac and retrogastric  spaces are clear.     Visceral tumor, adenopathy, stranding or caking is not currently  identified as described. There is no retroperitoneal or  "retrogastric  adenopathy. The lower lung zones are clear with no evidence of reactive  pleural effusion or occult mass. There is no liver metastasis and no  other acute focal CT abnormality is identified within the abdomen or  pelvis.     Incidental note is made of a nonobstructing stone right kidney right  lower pole.         3/9/2018 Imaging     CT Chest IMPRESSION:  No acute intrathoracic abnormality. No definite evidence of  metastatic disease.         3/13/2018 Procedure     Diagnostic laproscopy with biopsy and lysis of adhesions.  Pathology returned:  Final Diagnosis   PERITONEAL NODULE, EXCISIONAL BIOPSY:  Benign fibrotic nodule. (See comment)  JFJ/klb    Electronically signed by Boaz Jerez MD on 3/15/2018 at 1624   Comment See result below    The biopsy shows a lobulated fibrotic nodule with some intermixed lymphoid cells. The nodule does not appear neoplastic. One cannot entirely exclude a \"burned out\" lymph node, or other implant. There is no evidence of carcinoma.      Abdominal wall washings benign:  Final Diagnosis    1. ABDOMINAL WASH, WALL:  Negative for malignancy.  Scant cellularity; chronic inflammatory cells and scattered benign mesothelial cells.   2. ABDOMINAL WASH, WALL:  Negative for malignancy.  Scant cellularity; chronic inflammatory cells and scattered benign mesothelial cells.   3. ABDOMINAL WASH, WALL:  Negative for malignancy.  Benign mesothelial cells and mixed inflammatory cells.                5/9/2018 Genetic Testing     Genetic testing negative for cancer next panel         5/17/2018 Imaging     CT chest abdomen and pelvis showed Shrinkage of the gastric tumor with no evidence of metastasis            HISTORY OF PRESENT ILLNESS:  The patient is a 53 y.o. female, here for follow up on management of Gastric carcinoma.  I reviewed her images and reports thereof of CT.  She's had a good response.  No metastases discernible.  Nausea and fatigue are present with treatment but " "managed.      Past Medical History:   Diagnosis Date   • Abdominal pain    • Acid reflux    • Gastric cancer    • Gastric ulcer    • Wears glasses      Past Surgical History:   Procedure Laterality Date   • CERVICAL BIOPSY  W/ LOOP ELECTRODE EXCISION     • DIAGNOSTIC LAPAROSCOPY N/A 3/13/2018    Procedure: DIAGNOSTIC LAPAROSCOPY WITH BIOPSY, LYSIS OF ADHESIONS;  Surgeon: Nicole Crooks MD;  Location: [facility];  Service: General   • ENDOSCOPY     • TUBAL ABDOMINAL LIGATION     • VENOUS ACCESS DEVICE (PORT) INSERTION         No Known Allergies    Family History and Social History reviewed and changed as necessary      REVIEW OF SYSTEM:   Review of Systems   Constitutional: Negative for appetite change, chills, diaphoresis, fatigue, fever and unexpected weight change.   HENT:   Negative for mouth sores, sore throat and trouble swallowing.    Eyes: Negative for icterus.   Respiratory: Negative for cough, hemoptysis and shortness of breath.    Cardiovascular: Negative for chest pain, leg swelling and palpitations.   Gastrointestinal: Negative for abdominal distention, abdominal pain, blood in stool, constipation, diarrhea, nausea and vomiting.   Endocrine: Negative for hot flashes.   Genitourinary: Negative for bladder incontinence, difficulty urinating, dysuria, frequency and hematuria.    Musculoskeletal: Negative for gait problem, neck pain and neck stiffness.   Skin: Negative for rash.   Neurological: Negative for dizziness, gait problem, headaches, light-headedness and numbness.   Hematological: Negative for adenopathy. Does not bruise/bleed easily.   Psychiatric/Behavioral: Negative for depression. The patient is not nervous/anxious.    All other systems reviewed and are negative.       PHYSICAL EXAM    Vitals:    05/25/18 0920   BP: 135/76   Pulse: 90   Resp: 18   Temp: 98.7 °F (37.1 °C)   Weight: 81.6 kg (180 lb)   Height: 157.5 cm (62\")     Constitutional: Appears well-developed and well-nourished. No distress. "   ECOG: (1) Restricted in physically strenuous activity, ambulatory and able to do work of light nature  HENT:   Head: Normocephalic.   Mouth/Throat: Oropharynx is clear and moist.   Eyes: Conjunctivae are normal. Pupils are equal, round, and reactive to light. No scleral icterus.   Neck: Neck supple. No JVD present. No thyromegaly present.   Cardiovascular: Normal rate, regular rhythm and normal heart sounds.    Pulmonary/Chest: Breath sounds normal. No respiratory distress.   Abdominal: Soft. Exhibits no distension and no mass. There is no hepatosplenomegaly. There is no tenderness. There is no rebound and no guarding.   Musculoskeletal:Exhibits no edema, tenderness or deformity.   Neurological: Alert and oriented to person, place, and time. Exhibits normal muscle tone.   Skin: No ecchymosis, no petechiae and no rash noted. Not diaphoretic. No cyanosis. Nails show no clubbing.   Psychiatric: Normal mood and affect.   Vitals reviewed.      Ct Chest With Contrast    Result Date: 5/23/2018  1. Stable appearance of the chest without evidence for metastatic involvement. No acute intrathoracic abnormality. 2. Redemonstration of minimal gastric wall thickening and subtle nodularity laterally adjacent to the greater curvature of the stomach which is decreased in prominence from prior consistent with response. No new progression or metastatic involvement.  D:  05/23/2018 E:  05/23/2018    This report was finalized on 5/23/2018 1:03 PM by Dr. Jorgito Walker.      Ct Abdomen Pelvis With Contrast    Result Date: 5/23/2018  1. Stable appearance of the chest without evidence for metastatic involvement. No acute intrathoracic abnormality. 2. Redemonstration of minimal gastric wall thickening and subtle nodularity laterally adjacent to the greater curvature of the stomach which is decreased in prominence from prior consistent with response. No new progression or metastatic involvement.  D:  05/23/2018 E:  05/23/2018    This report  was finalized on 5/23/2018 1:03 PM by Dr. Jorgito Walker.              ASSESSMENT & PLAN:    1.  Gastric carcinoma  2. Nausea  3. Fatigue    Discussion: good response radiographically to therapy.  No distant metastases are progression.  Have spoken by voice mail to Dr. Crooks to get her back in for surgery.  We'll see her back with my nurse practitioner in a month and at that point will set her up for 4 more courses of FLOT assuming she has healed well.  Discussed with patient 25 minutes rated than 50% counseling.      Kaden Oswald MD    05/25/2018

## 2018-06-04 ENCOUNTER — DOCUMENTATION (OUTPATIENT)
Dept: OTHER | Facility: HOSPITAL | Age: 53
End: 2018-06-04

## 2018-06-04 NOTE — PROGRESS NOTES
I saw patient in Dr. Crooks's office. Patient has completed 4 cycles of chemo with the last one on May 17, 2018. Patient has lost her hair and does have cold sensitivity from the Oxaliplatin. Patient is talking to Dr. Crooks about plans for surgery. I will continue to be available to navigate patient through the [employer]

## 2018-06-06 ENCOUNTER — DOCUMENTATION (OUTPATIENT)
Dept: ONCOLOGY | Facility: CLINIC | Age: 53
End: 2018-06-06

## 2018-06-12 NOTE — PROGRESS NOTES
6/11/2018  Rec'd [employer] Disability Form from [employer].  Attached signed GRETA- 4/6/2018 6/12/2018  Completed, attached MR, & put in MD box.    6/14/2018  Rec'd Form with MD signature.  Faxed to #[phone].

## 2018-06-19 ENCOUNTER — TELEPHONE (OUTPATIENT)
Dept: ONCOLOGY | Facility: CLINIC | Age: 53
End: 2018-06-19

## 2018-06-19 NOTE — TELEPHONE ENCOUNTER
----- Message from Cari Smith sent at 6/19/2018  2:40 PM EDT -----  Regarding: FAMILIA FR[place]   Contact: [phone]  PATIENT CANCELED FOLLOW UP ON 06/29/2018. SHE IS HAVING STOMACH SURGERY ON 06/29/2018 WITH DR WHALEY DUE TO HER CANCER.

## 2018-06-25 ENCOUNTER — APPOINTMENT (OUTPATIENT)
Dept: PREADMISSION TESTING | Facility: HOSPITAL | Age: 53
End: 2018-06-25

## 2018-06-25 VITALS — BODY MASS INDEX: 33.35 KG/M2 | HEIGHT: 62 IN | WEIGHT: 181.22 LBS

## 2018-06-25 LAB
ABO GROUP BLD: NORMAL
ALBUMIN SERPL-MCNC: 4.17 G/DL (ref 3.2–4.8)
ALBUMIN/GLOB SERPL: 1.7 G/DL (ref 1.5–2.5)
ALP SERPL-CCNC: 80 U/L (ref 25–100)
ALT SERPL W P-5'-P-CCNC: 28 U/L (ref 7–40)
ANION GAP SERPL CALCULATED.3IONS-SCNC: 7 MMOL/L (ref 3–11)
AST SERPL-CCNC: 30 U/L (ref 0–33)
B-HCG UR QL: NEGATIVE
BILIRUB SERPL-MCNC: 0.4 MG/DL (ref 0.3–1.2)
BLD GP AB SCN SERPL QL: POSITIVE
BUN BLD-MCNC: 14 MG/DL (ref 9–23)
BUN/CREAT SERPL: 18.7 (ref 7–25)
CALCIUM SPEC-SCNC: 8.9 MG/DL (ref 8.7–10.4)
CHLORIDE SERPL-SCNC: 109 MMOL/L (ref 99–109)
CO2 SERPL-SCNC: 24 MMOL/L (ref 20–31)
COLD SCREEN: POSITIVE
CREAT BLD-MCNC: 0.75 MG/DL (ref 0.6–1.3)
DEPRECATED RDW RBC AUTO: 77 FL (ref 37–54)
ERYTHROCYTE [DISTWIDTH] IN BLOOD BY AUTOMATED COUNT: 29.3 % (ref 11.3–14.5)
GFR SERPL CREATININE-BSD FRML MDRD: 98 ML/MIN/1.73
GLOBULIN UR ELPH-MCNC: 2.4 GM/DL
GLUCOSE BLD-MCNC: 109 MG/DL (ref 70–100)
HBA1C MFR BLD: 5.8 % (ref 4.8–5.6)
HCT VFR BLD AUTO: 37.7 % (ref 34.5–44)
HGB BLD-MCNC: 11.2 G/DL (ref 11.5–15.5)
INR PPP: 0.9 (ref 0.91–1.09)
MCH RBC QN AUTO: 21.5 PG (ref 27–31)
MCHC RBC AUTO-ENTMCNC: 29.7 G/DL (ref 32–36)
MCV RBC AUTO: 72.5 FL (ref 80–99)
NONSPECIFIC COLD ANTIBODY: NORMAL
PLATELET # BLD AUTO: 221 10*3/MM3 (ref 150–450)
PMV BLD AUTO: ABNORMAL FL (ref 6–12)
POTASSIUM BLD-SCNC: 4 MMOL/L (ref 3.5–5.5)
PROT SERPL-MCNC: 6.6 G/DL (ref 5.7–8.2)
PROTHROMBIN TIME: 9.5 SECONDS (ref 9.6–11.5)
RBC # BLD AUTO: 5.2 10*6/MM3 (ref 3.89–5.14)
RH BLD: POSITIVE
SALINE SCREEN: NEGATIVE
SODIUM BLD-SCNC: 140 MMOL/L (ref 132–146)
WBC NRBC COR # BLD: 4.12 10*3/MM3 (ref 3.5–10.8)

## 2018-06-25 PROCEDURE — 81025 URINE PREGNANCY TEST: CPT | Performed by: SURGERY

## 2018-06-25 PROCEDURE — 86850 RBC ANTIBODY SCREEN: CPT | Performed by: SURGERY

## 2018-06-25 PROCEDURE — 36415 COLL VENOUS BLD VENIPUNCTURE: CPT

## 2018-06-25 PROCEDURE — 86870 RBC ANTIBODY IDENTIFICATION: CPT | Performed by: SURGERY

## 2018-06-25 PROCEDURE — 83036 HEMOGLOBIN GLYCOSYLATED A1C: CPT | Performed by: SURGERY

## 2018-06-25 PROCEDURE — 86901 BLOOD TYPING SEROLOGIC RH(D): CPT | Performed by: SURGERY

## 2018-06-25 PROCEDURE — 80053 COMPREHEN METABOLIC PANEL: CPT | Performed by: SURGERY

## 2018-06-25 PROCEDURE — 85027 COMPLETE CBC AUTOMATED: CPT | Performed by: SURGERY

## 2018-06-25 PROCEDURE — 85610 PROTHROMBIN TIME: CPT | Performed by: SURGERY

## 2018-06-25 PROCEDURE — 86900 BLOOD TYPING SEROLOGIC ABO: CPT | Performed by: SURGERY

## 2018-06-25 NOTE — DISCHARGE INSTRUCTIONS

## 2018-06-25 NOTE — PAT
Patient to apply Chlorhexadine wipes  to surgical area (as instructed) the night before procedure and the AM of procedure. Wipes provided.    ekg from 3-2018 cleared per dr dale

## 2018-06-27 ENCOUNTER — DOCUMENTATION (OUTPATIENT)
Dept: NUTRITION | Facility: HOSPITAL | Age: 53
End: 2018-06-27

## 2018-06-29 ENCOUNTER — HOSPITAL ENCOUNTER (INPATIENT)
Facility: HOSPITAL | Age: 53
LOS: 6 days | Discharge: HOME | End: 2018-07-05
Attending: SURGERY | Admitting: SURGERY

## 2018-06-29 ENCOUNTER — ANESTHESIA EVENT (OUTPATIENT)
Dept: PERIOP | Facility: HOSPITAL | Age: 53
End: 2018-06-29

## 2018-06-29 ENCOUNTER — ANESTHESIA (OUTPATIENT)
Dept: PERIOP | Facility: HOSPITAL | Age: 53
End: 2018-06-29

## 2018-06-29 DIAGNOSIS — K31.89 MASS OF STOMACH: ICD-10-CM

## 2018-06-29 LAB
ABO GROUP BLD: NORMAL
ABO GROUP BLD: NORMAL
B-HCG UR QL: NEGATIVE
BLD GP AB SCN SERPL QL: POSITIVE
COLD SCREEN: POSITIVE
DEPRECATED RDW RBC AUTO: 75.4 FL (ref 37–54)
ERYTHROCYTE [DISTWIDTH] IN BLOOD BY AUTOMATED COUNT: 28.4 % (ref 11.3–14.5)
HCT VFR BLD AUTO: 36.5 % (ref 34.5–44)
HGB BLD-MCNC: 11.1 G/DL (ref 11.5–15.5)
INTERNAL NEGATIVE CONTROL: NORMAL
INTERNAL POSITIVE CONTROL: REACTIVE
Lab: NORMAL
MCH RBC QN AUTO: 22.3 PG (ref 27–31)
MCHC RBC AUTO-ENTMCNC: 30.4 G/DL (ref 32–36)
MCV RBC AUTO: 73.3 FL (ref 80–99)
NONSPECIFIC COLD ANTIBODY: NORMAL
PLATELET # BLD AUTO: 190 10*3/MM3 (ref 150–450)
RBC # BLD AUTO: 4.98 10*6/MM3 (ref 3.89–5.14)
RH BLD: POSITIVE
RH BLD: POSITIVE
SALINE SCREEN: NEGATIVE
T&S EXPIRATION DATE: NORMAL
WBC NRBC COR # BLD: 8.41 10*3/MM3 (ref 3.5–10.8)

## 2018-06-29 PROCEDURE — 25010000003 HYDROMORPHONE PER 4 MG: Performed by: SURGERY

## 2018-06-29 PROCEDURE — 25010000002 PROPOFOL 1000 MG/ML EMULSION: Performed by: NURSE ANESTHETIST, CERTIFIED REGISTERED

## 2018-06-29 PROCEDURE — 25010000002 ONDANSETRON PER 1 MG: Performed by: NURSE ANESTHETIST, CERTIFIED REGISTERED

## 2018-06-29 PROCEDURE — 25010000002 BUPRENORPHINE PER 0.1 MG: Performed by: NURSE ANESTHETIST, CERTIFIED REGISTERED

## 2018-06-29 PROCEDURE — 25010000002 NEOSTIGMINE PER 0.5 MG: Performed by: NURSE ANESTHETIST, CERTIFIED REGISTERED

## 2018-06-29 PROCEDURE — 88332 PATH CONSLTJ SURG EA ADD BLK: CPT | Performed by: PATHOLOGY

## 2018-06-29 PROCEDURE — 88309 TISSUE EXAM BY PATHOLOGIST: CPT | Performed by: SURGERY

## 2018-06-29 PROCEDURE — 0DB60ZZ EXCISION OF STOMACH, OPEN APPROACH: ICD-10-PCS | Performed by: SURGERY

## 2018-06-29 PROCEDURE — 85027 COMPLETE CBC AUTOMATED: CPT | Performed by: SURGERY

## 2018-06-29 PROCEDURE — 25010000002 CEFOXITIN: Performed by: SURGERY

## 2018-06-29 PROCEDURE — 25010000002 HYDROMORPHONE PER 4 MG: Performed by: NURSE ANESTHETIST, CERTIFIED REGISTERED

## 2018-06-29 PROCEDURE — 86920 COMPATIBILITY TEST SPIN: CPT

## 2018-06-29 PROCEDURE — 25010000002 FENTANYL CITRATE (PF) 100 MCG/2ML SOLUTION: Performed by: NURSE ANESTHETIST, CERTIFIED REGISTERED

## 2018-06-29 PROCEDURE — 25010000002 ALBUMIN HUMAN 5% PER 50 ML

## 2018-06-29 PROCEDURE — 0D160ZA BYPASS STOMACH TO JEJUNUM, OPEN APPROACH: ICD-10-PCS | Performed by: SURGERY

## 2018-06-29 PROCEDURE — 88305 TISSUE EXAM BY PATHOLOGIST: CPT | Performed by: SURGERY

## 2018-06-29 PROCEDURE — 25010000002 MIDAZOLAM PER 1 MG: Performed by: NURSE ANESTHETIST, CERTIFIED REGISTERED

## 2018-06-29 PROCEDURE — 86870 RBC ANTIBODY IDENTIFICATION: CPT | Performed by: SURGERY

## 2018-06-29 PROCEDURE — 86901 BLOOD TYPING SEROLOGIC RH(D): CPT | Performed by: SURGERY

## 2018-06-29 PROCEDURE — 25010000002 DEXAMETHASONE SODIUM PHOSPHATE 10 MG/ML SOLUTION 1 ML VIAL: Performed by: NURSE ANESTHETIST, CERTIFIED REGISTERED

## 2018-06-29 PROCEDURE — 86900 BLOOD TYPING SEROLOGIC ABO: CPT | Performed by: SURGERY

## 2018-06-29 PROCEDURE — 86850 RBC ANTIBODY SCREEN: CPT | Performed by: SURGERY

## 2018-06-29 PROCEDURE — 25010000002 PROPOFOL 10 MG/ML EMULSION: Performed by: NURSE ANESTHETIST, CERTIFIED REGISTERED

## 2018-06-29 PROCEDURE — P9041 ALBUMIN (HUMAN),5%, 50ML: HCPCS

## 2018-06-29 PROCEDURE — 07BB0ZX EXCISION OF MESENTERIC LYMPHATIC, OPEN APPROACH, DIAGNOSTIC: ICD-10-PCS | Performed by: SURGERY

## 2018-06-29 PROCEDURE — 86922 COMPATIBILITY TEST ANTIGLOB: CPT

## 2018-06-29 PROCEDURE — 88331 PATH CONSLTJ SURG 1 BLK 1SPC: CPT | Performed by: PATHOLOGY

## 2018-06-29 PROCEDURE — 25010000002 DEXAMETHASONE SODIUM PHOSPHATE 10 MG/ML SOLUTION: Performed by: NURSE ANESTHETIST, CERTIFIED REGISTERED

## 2018-06-29 RX ORDER — SODIUM CHLORIDE 0.9 % (FLUSH) 0.9 %
1-10 SYRINGE (ML) INJECTION AS NEEDED
Status: DISCONTINUED | OUTPATIENT
Start: 2018-06-29 | End: 2018-06-29 | Stop reason: HOSPADM

## 2018-06-29 RX ORDER — ALBUMIN, HUMAN INJ 5% 5 %
SOLUTION INTRAVENOUS CONTINUOUS PRN
Status: DISCONTINUED | OUTPATIENT
Start: 2018-06-29 | End: 2018-06-29 | Stop reason: SURG

## 2018-06-29 RX ORDER — PROMETHAZINE HYDROCHLORIDE 25 MG/ML
6.25 INJECTION, SOLUTION INTRAMUSCULAR; INTRAVENOUS ONCE AS NEEDED
Status: DISCONTINUED | OUTPATIENT
Start: 2018-06-29 | End: 2018-06-29 | Stop reason: SDUPTHER

## 2018-06-29 RX ORDER — PROMETHAZINE HYDROCHLORIDE 25 MG/ML
12.5 INJECTION, SOLUTION INTRAMUSCULAR; INTRAVENOUS ONCE AS NEEDED
Status: DISCONTINUED | OUTPATIENT
Start: 2018-06-29 | End: 2018-06-29 | Stop reason: HOSPADM

## 2018-06-29 RX ORDER — ONDANSETRON 2 MG/ML
INJECTION INTRAMUSCULAR; INTRAVENOUS AS NEEDED
Status: DISCONTINUED | OUTPATIENT
Start: 2018-06-29 | End: 2018-06-29 | Stop reason: SURG

## 2018-06-29 RX ORDER — PROMETHAZINE HYDROCHLORIDE 25 MG/1
25 SUPPOSITORY RECTAL ONCE AS NEEDED
Status: DISCONTINUED | OUTPATIENT
Start: 2018-06-29 | End: 2018-06-29 | Stop reason: SDUPTHER

## 2018-06-29 RX ORDER — MIDAZOLAM HYDROCHLORIDE 1 MG/ML
INJECTION INTRAMUSCULAR; INTRAVENOUS AS NEEDED
Status: DISCONTINUED | OUTPATIENT
Start: 2018-06-29 | End: 2018-06-29 | Stop reason: SURG

## 2018-06-29 RX ORDER — FAMOTIDINE 20 MG/1
20 TABLET, FILM COATED ORAL ONCE
Status: COMPLETED | OUTPATIENT
Start: 2018-06-29 | End: 2018-06-29

## 2018-06-29 RX ORDER — ATRACURIUM BESYLATE 10 MG/ML
INJECTION, SOLUTION INTRAVENOUS AS NEEDED
Status: DISCONTINUED | OUTPATIENT
Start: 2018-06-29 | End: 2018-06-29 | Stop reason: SURG

## 2018-06-29 RX ORDER — LIDOCAINE HYDROCHLORIDE 10 MG/ML
INJECTION, SOLUTION INFILTRATION; PERINEURAL AS NEEDED
Status: DISCONTINUED | OUTPATIENT
Start: 2018-06-29 | End: 2018-06-29 | Stop reason: SURG

## 2018-06-29 RX ORDER — PROMETHAZINE HYDROCHLORIDE 25 MG/1
25 SUPPOSITORY RECTAL ONCE AS NEEDED
Status: DISCONTINUED | OUTPATIENT
Start: 2018-06-29 | End: 2018-06-29 | Stop reason: HOSPADM

## 2018-06-29 RX ORDER — LIDOCAINE HYDROCHLORIDE 10 MG/ML
0.5 INJECTION, SOLUTION EPIDURAL; INFILTRATION; INTRACAUDAL; PERINEURAL ONCE AS NEEDED
Status: COMPLETED | OUTPATIENT
Start: 2018-06-29 | End: 2018-06-29

## 2018-06-29 RX ORDER — MAGNESIUM HYDROXIDE 1200 MG/15ML
LIQUID ORAL AS NEEDED
Status: DISCONTINUED | OUTPATIENT
Start: 2018-06-29 | End: 2018-06-29 | Stop reason: HOSPADM

## 2018-06-29 RX ORDER — SODIUM CHLORIDE 9 MG/ML
INJECTION, SOLUTION INTRAVENOUS CONTINUOUS PRN
Status: DISCONTINUED | OUTPATIENT
Start: 2018-06-29 | End: 2018-06-29 | Stop reason: SURG

## 2018-06-29 RX ORDER — PROMETHAZINE HYDROCHLORIDE 25 MG/1
25 TABLET ORAL ONCE AS NEEDED
Status: DISCONTINUED | OUTPATIENT
Start: 2018-06-29 | End: 2018-06-29 | Stop reason: HOSPADM

## 2018-06-29 RX ORDER — DIPHENHYDRAMINE HYDROCHLORIDE 50 MG/ML
25 INJECTION INTRAMUSCULAR; INTRAVENOUS EVERY 6 HOURS PRN
Status: DISCONTINUED | OUTPATIENT
Start: 2018-06-29 | End: 2018-07-05 | Stop reason: HOSPADM

## 2018-06-29 RX ORDER — ONDANSETRON 2 MG/ML
4 INJECTION INTRAMUSCULAR; INTRAVENOUS EVERY 6 HOURS PRN
Status: DISCONTINUED | OUTPATIENT
Start: 2018-06-29 | End: 2018-07-05 | Stop reason: HOSPADM

## 2018-06-29 RX ORDER — ONDANSETRON 2 MG/ML
4 INJECTION INTRAMUSCULAR; INTRAVENOUS ONCE AS NEEDED
Status: DISCONTINUED | OUTPATIENT
Start: 2018-06-29 | End: 2018-06-29 | Stop reason: HOSPADM

## 2018-06-29 RX ORDER — SODIUM CHLORIDE 9 MG/ML
125 INJECTION, SOLUTION INTRAVENOUS CONTINUOUS
Status: DISCONTINUED | OUTPATIENT
Start: 2018-06-29 | End: 2018-06-30

## 2018-06-29 RX ORDER — GLYCOPYRROLATE 0.2 MG/ML
INJECTION INTRAMUSCULAR; INTRAVENOUS AS NEEDED
Status: DISCONTINUED | OUTPATIENT
Start: 2018-06-29 | End: 2018-06-29 | Stop reason: SURG

## 2018-06-29 RX ORDER — FENTANYL CITRATE 50 UG/ML
INJECTION, SOLUTION INTRAMUSCULAR; INTRAVENOUS AS NEEDED
Status: DISCONTINUED | OUTPATIENT
Start: 2018-06-29 | End: 2018-06-29 | Stop reason: SURG

## 2018-06-29 RX ORDER — ONDANSETRON 4 MG/1
4 TABLET, FILM COATED ORAL EVERY 6 HOURS PRN
Status: DISCONTINUED | OUTPATIENT
Start: 2018-06-29 | End: 2018-07-05 | Stop reason: HOSPADM

## 2018-06-29 RX ORDER — FENTANYL CITRATE 50 UG/ML
50 INJECTION, SOLUTION INTRAMUSCULAR; INTRAVENOUS
Status: DISCONTINUED | OUTPATIENT
Start: 2018-06-29 | End: 2018-06-29 | Stop reason: HOSPADM

## 2018-06-29 RX ORDER — NALOXONE HCL 0.4 MG/ML
0.1 VIAL (ML) INJECTION
Status: DISCONTINUED | OUTPATIENT
Start: 2018-06-29 | End: 2018-07-05 | Stop reason: HOSPADM

## 2018-06-29 RX ORDER — PROPOFOL 10 MG/ML
VIAL (ML) INTRAVENOUS AS NEEDED
Status: DISCONTINUED | OUTPATIENT
Start: 2018-06-29 | End: 2018-06-29 | Stop reason: SURG

## 2018-06-29 RX ORDER — DEXAMETHASONE SODIUM PHOSPHATE 10 MG/ML
INJECTION, SOLUTION INTRAMUSCULAR; INTRAVENOUS AS NEEDED
Status: DISCONTINUED | OUTPATIENT
Start: 2018-06-29 | End: 2018-06-29 | Stop reason: SURG

## 2018-06-29 RX ORDER — PROMETHAZINE HYDROCHLORIDE 25 MG/1
25 TABLET ORAL ONCE AS NEEDED
Status: DISCONTINUED | OUTPATIENT
Start: 2018-06-29 | End: 2018-06-29 | Stop reason: SDUPTHER

## 2018-06-29 RX ORDER — SODIUM CHLORIDE, SODIUM LACTATE, POTASSIUM CHLORIDE, CALCIUM CHLORIDE 600; 310; 30; 20 MG/100ML; MG/100ML; MG/100ML; MG/100ML
9 INJECTION, SOLUTION INTRAVENOUS CONTINUOUS
Status: DISCONTINUED | OUTPATIENT
Start: 2018-06-29 | End: 2018-07-05 | Stop reason: HOSPADM

## 2018-06-29 RX ADMIN — HYDROMORPHONE HYDROCHLORIDE 0.5 MG: 1 INJECTION, SOLUTION INTRAMUSCULAR; INTRAVENOUS; SUBCUTANEOUS at 15:15

## 2018-06-29 RX ADMIN — SODIUM CHLORIDE 125 ML/HR: 9 INJECTION, SOLUTION INTRAVENOUS at 15:46

## 2018-06-29 RX ADMIN — ONDANSETRON 4 MG: 2 INJECTION INTRAMUSCULAR; INTRAVENOUS at 13:30

## 2018-06-29 RX ADMIN — SODIUM CHLORIDE 125 ML/HR: 9 INJECTION, SOLUTION INTRAVENOUS at 23:56

## 2018-06-29 RX ADMIN — DEXAMETHASONE SODIUM PHOSPHATE 60 ML: 10 INJECTION, SOLUTION INTRAMUSCULAR; INTRAVENOUS at 11:32

## 2018-06-29 RX ADMIN — ATRACURIUM BESYLATE 10 MG: 10 INJECTION, SOLUTION INTRAVENOUS at 11:59

## 2018-06-29 RX ADMIN — ATRACURIUM BESYLATE 10 MG: 10 INJECTION, SOLUTION INTRAVENOUS at 12:38

## 2018-06-29 RX ADMIN — FENTANYL CITRATE 50 MCG: 50 INJECTION, SOLUTION INTRAMUSCULAR; INTRAVENOUS at 11:25

## 2018-06-29 RX ADMIN — FENTANYL CITRATE 50 MCG: 50 INJECTION, SOLUTION INTRAMUSCULAR; INTRAVENOUS at 14:28

## 2018-06-29 RX ADMIN — FAMOTIDINE 20 MG: 20 TABLET ORAL at 09:22

## 2018-06-29 RX ADMIN — SODIUM CHLORIDE: 9 INJECTION, SOLUTION INTRAVENOUS at 11:32

## 2018-06-29 RX ADMIN — MIDAZOLAM HYDROCHLORIDE 2 MG: 1 INJECTION, SOLUTION INTRAMUSCULAR; INTRAVENOUS at 11:18

## 2018-06-29 RX ADMIN — HYDROMORPHONE HYDROCHLORIDE 0.5 MG: 1 INJECTION, SOLUTION INTRAMUSCULAR; INTRAVENOUS; SUBCUTANEOUS at 14:57

## 2018-06-29 RX ADMIN — CEFOXITIN 2 G: 2 INJECTION, POWDER, FOR SOLUTION INTRAVENOUS at 11:34

## 2018-06-29 RX ADMIN — SODIUM CHLORIDE, POTASSIUM CHLORIDE, SODIUM LACTATE AND CALCIUM CHLORIDE 9 ML/HR: 600; 310; 30; 20 INJECTION, SOLUTION INTRAVENOUS at 09:22

## 2018-06-29 RX ADMIN — DEXAMETHASONE SODIUM PHOSPHATE 6 MG: 10 INJECTION, SOLUTION INTRAMUSCULAR; INTRAVENOUS at 11:47

## 2018-06-29 RX ADMIN — ATRACURIUM BESYLATE 20 MG: 10 INJECTION, SOLUTION INTRAVENOUS at 13:14

## 2018-06-29 RX ADMIN — ATRACURIUM BESYLATE 40 MG: 10 INJECTION, SOLUTION INTRAVENOUS at 11:25

## 2018-06-29 RX ADMIN — Medication 3 MG: at 13:44

## 2018-06-29 RX ADMIN — LIDOCAINE HYDROCHLORIDE 50 MG: 10 INJECTION, SOLUTION INFILTRATION; PERINEURAL at 11:25

## 2018-06-29 RX ADMIN — HYDROMORPHONE HYDROCHLORIDE: 10 INJECTION INTRAMUSCULAR; INTRAVENOUS; SUBCUTANEOUS at 14:34

## 2018-06-29 RX ADMIN — FENTANYL CITRATE 50 MCG: 50 INJECTION, SOLUTION INTRAMUSCULAR; INTRAVENOUS at 14:37

## 2018-06-29 RX ADMIN — CEFOXITIN SODIUM 2 G: 2 POWDER, FOR SOLUTION INTRAVENOUS at 18:20

## 2018-06-29 RX ADMIN — GLYCOPYRROLATE 0.4 MG: 0.2 INJECTION, SOLUTION INTRAMUSCULAR; INTRAVENOUS at 13:44

## 2018-06-29 RX ADMIN — ALBUMIN, HUMAN INJ 5%: 5 SOLUTION at 12:20

## 2018-06-29 RX ADMIN — LIDOCAINE HYDROCHLORIDE 0.5 ML: 10 INJECTION, SOLUTION EPIDURAL; INFILTRATION; INTRACAUDAL; PERINEURAL at 09:22

## 2018-06-29 RX ADMIN — PROPOFOL 160 MG: 10 INJECTION, EMULSION INTRAVENOUS at 11:25

## 2018-06-29 RX ADMIN — PROPOFOL 25 MCG/KG/MIN: 10 INJECTION, EMULSION INTRAVENOUS at 11:38

## 2018-06-30 LAB
ANION GAP SERPL CALCULATED.3IONS-SCNC: 7 MMOL/L (ref 3–11)
BASOPHILS # BLD AUTO: 0.01 10*3/MM3 (ref 0–0.2)
BASOPHILS NFR BLD AUTO: 0.1 % (ref 0–1)
BUN BLD-MCNC: 7 MG/DL (ref 9–23)
BUN/CREAT SERPL: 9.6 (ref 7–25)
CALCIUM SPEC-SCNC: 8.4 MG/DL (ref 8.7–10.4)
CHLORIDE SERPL-SCNC: 107 MMOL/L (ref 99–109)
CO2 SERPL-SCNC: 25 MMOL/L (ref 20–31)
CREAT BLD-MCNC: 0.73 MG/DL (ref 0.6–1.3)
DEPRECATED RDW RBC AUTO: 75.3 FL (ref 37–54)
EOSINOPHIL # BLD AUTO: 0 10*3/MM3 (ref 0–0.3)
EOSINOPHIL NFR BLD AUTO: 0 % (ref 0–3)
ERYTHROCYTE [DISTWIDTH] IN BLOOD BY AUTOMATED COUNT: 28.4 % (ref 11.3–14.5)
GFR SERPL CREATININE-BSD FRML MDRD: 101 ML/MIN/1.73
GLUCOSE BLD-MCNC: 105 MG/DL (ref 70–100)
HCT VFR BLD AUTO: 34.6 % (ref 34.5–44)
HGB BLD-MCNC: 10.4 G/DL (ref 11.5–15.5)
HYPOCHROMIA BLD QL: NORMAL
IMM GRANULOCYTES # BLD: 0.02 10*3/MM3 (ref 0–0.03)
IMM GRANULOCYTES NFR BLD: 0.2 % (ref 0–0.6)
LARGE PLATELETS: NORMAL
LYMPHOCYTES # BLD AUTO: 0.92 10*3/MM3 (ref 0.6–4.8)
LYMPHOCYTES NFR BLD AUTO: 8.6 % (ref 24–44)
MCH RBC QN AUTO: 22 PG (ref 27–31)
MCHC RBC AUTO-ENTMCNC: 30.1 G/DL (ref 32–36)
MCV RBC AUTO: 73.2 FL (ref 80–99)
MICROCYTES BLD QL: NORMAL
MONOCYTES # BLD AUTO: 1.02 10*3/MM3 (ref 0–1)
MONOCYTES NFR BLD AUTO: 9.6 % (ref 0–12)
NEUTROPHILS # BLD AUTO: 8.7 10*3/MM3 (ref 1.5–8.3)
NEUTROPHILS NFR BLD AUTO: 81.7 % (ref 41–71)
PLATELET # BLD AUTO: 201 10*3/MM3 (ref 150–450)
POTASSIUM BLD-SCNC: 4.1 MMOL/L (ref 3.5–5.5)
RBC # BLD AUTO: 4.73 10*6/MM3 (ref 3.89–5.14)
SODIUM BLD-SCNC: 139 MMOL/L (ref 132–146)
WBC MORPH BLD: NORMAL
WBC NRBC COR # BLD: 10.65 10*3/MM3 (ref 3.5–10.8)

## 2018-06-30 PROCEDURE — 85007 BL SMEAR W/DIFF WBC COUNT: CPT | Performed by: SURGERY

## 2018-06-30 PROCEDURE — 25010000002 CEFOXITIN: Performed by: SURGERY

## 2018-06-30 PROCEDURE — 25010000002 ENOXAPARIN PER 10 MG: Performed by: SURGERY

## 2018-06-30 PROCEDURE — 25010000002 KETOROLAC TROMETHAMINE PER 15 MG: Performed by: SURGERY

## 2018-06-30 PROCEDURE — 25010000003 HYDROMORPHONE PER 4 MG: Performed by: SURGERY

## 2018-06-30 PROCEDURE — 80048 BASIC METABOLIC PNL TOTAL CA: CPT | Performed by: SURGERY

## 2018-06-30 PROCEDURE — 85025 COMPLETE CBC W/AUTO DIFF WBC: CPT | Performed by: SURGERY

## 2018-06-30 RX ORDER — KETOROLAC TROMETHAMINE 30 MG/ML
30 INJECTION, SOLUTION INTRAMUSCULAR; INTRAVENOUS EVERY 8 HOURS
Status: COMPLETED | OUTPATIENT
Start: 2018-06-30 | End: 2018-07-02

## 2018-06-30 RX ORDER — KETOROLAC TROMETHAMINE 30 MG/ML
30 INJECTION, SOLUTION INTRAMUSCULAR; INTRAVENOUS EVERY 8 HOURS
Status: DISCONTINUED | OUTPATIENT
Start: 2018-06-30 | End: 2018-06-30

## 2018-06-30 RX ORDER — DEXTROSE, SODIUM CHLORIDE, AND POTASSIUM CHLORIDE 5; .45; .15 G/100ML; G/100ML; G/100ML
50 INJECTION INTRAVENOUS CONTINUOUS
Status: DISCONTINUED | OUTPATIENT
Start: 2018-06-30 | End: 2018-07-03

## 2018-06-30 RX ORDER — PANTOPRAZOLE SODIUM 40 MG/10ML
40 INJECTION, POWDER, LYOPHILIZED, FOR SOLUTION INTRAVENOUS
Status: DISCONTINUED | OUTPATIENT
Start: 2018-06-30 | End: 2018-07-05 | Stop reason: ALTCHOICE

## 2018-06-30 RX ADMIN — ENOXAPARIN SODIUM 40 MG: 40 INJECTION SUBCUTANEOUS at 09:52

## 2018-06-30 RX ADMIN — HYDROMORPHONE HYDROCHLORIDE: 10 INJECTION INTRAMUSCULAR; INTRAVENOUS; SUBCUTANEOUS at 00:22

## 2018-06-30 RX ADMIN — HYDROMORPHONE HYDROCHLORIDE: 10 INJECTION INTRAMUSCULAR; INTRAVENOUS; SUBCUTANEOUS at 11:06

## 2018-06-30 RX ADMIN — CEFOXITIN SODIUM 2 G: 2 POWDER, FOR SOLUTION INTRAVENOUS at 00:29

## 2018-06-30 RX ADMIN — CEFOXITIN SODIUM 2 G: 2 POWDER, FOR SOLUTION INTRAVENOUS at 06:17

## 2018-06-30 RX ADMIN — KETOROLAC TROMETHAMINE 30 MG: 30 INJECTION, SOLUTION INTRAMUSCULAR at 09:53

## 2018-06-30 RX ADMIN — POTASSIUM CHLORIDE, DEXTROSE MONOHYDRATE AND SODIUM CHLORIDE 100 ML/HR: 150; 5; 450 INJECTION, SOLUTION INTRAVENOUS at 08:30

## 2018-06-30 RX ADMIN — PANTOPRAZOLE SODIUM 40 MG: 40 INJECTION, POWDER, FOR SOLUTION INTRAVENOUS at 12:15

## 2018-06-30 RX ADMIN — KETOROLAC TROMETHAMINE 30 MG: 30 INJECTION, SOLUTION INTRAMUSCULAR at 18:21

## 2018-07-01 PROCEDURE — 25010000002 ENOXAPARIN PER 10 MG: Performed by: SURGERY

## 2018-07-01 PROCEDURE — 25010000003 HYDROMORPHONE PER 4 MG: Performed by: SURGERY

## 2018-07-01 PROCEDURE — 25010000002 KETOROLAC TROMETHAMINE PER 15 MG: Performed by: SURGERY

## 2018-07-01 RX ADMIN — KETOROLAC TROMETHAMINE 30 MG: 30 INJECTION, SOLUTION INTRAMUSCULAR at 17:59

## 2018-07-01 RX ADMIN — POTASSIUM CHLORIDE, DEXTROSE MONOHYDRATE AND SODIUM CHLORIDE 100 ML/HR: 150; 5; 450 INJECTION, SOLUTION INTRAVENOUS at 04:14

## 2018-07-01 RX ADMIN — PANTOPRAZOLE SODIUM 40 MG: 40 INJECTION, POWDER, FOR SOLUTION INTRAVENOUS at 05:07

## 2018-07-01 RX ADMIN — HYDROMORPHONE HYDROCHLORIDE: 10 INJECTION INTRAMUSCULAR; INTRAVENOUS; SUBCUTANEOUS at 20:35

## 2018-07-01 RX ADMIN — KETOROLAC TROMETHAMINE 30 MG: 30 INJECTION, SOLUTION INTRAMUSCULAR at 02:36

## 2018-07-01 RX ADMIN — POTASSIUM CHLORIDE, DEXTROSE MONOHYDRATE AND SODIUM CHLORIDE 100 ML/HR: 150; 5; 450 INJECTION, SOLUTION INTRAVENOUS at 14:00

## 2018-07-01 RX ADMIN — KETOROLAC TROMETHAMINE 30 MG: 30 INJECTION, SOLUTION INTRAMUSCULAR at 09:28

## 2018-07-01 RX ADMIN — ENOXAPARIN SODIUM 40 MG: 40 INJECTION SUBCUTANEOUS at 09:28

## 2018-07-01 NOTE — OP NOTE
Operative Note    Date of Surgery:  6/29/2018    Pre-Operative Diagnosis: Locally advanced gastric cancer    Post-Operative Diagnosis: Locally advanced gastric cancer    Procedure:    1.  Subtotal Gastectomy with Bilroth II anastomosis  2.  D2 Lymphadenectomy    Anesthesia:  General    Surgeon:  Nicole Crooks MD    Assistant:  Seymour Chin MD    Estimated Blood Loss:  50 mL    Findings:   1.  Gastric mass identified in body of stomach  2.  No peritoneal metastases or liver lesions concerning for metastases appreciated  3.  No malignant appearing lymph nodes at D1 and D2 stations     Complications: None    Indication for Procedure:   Ms. Nobles is a 53 year old female with no significant medical history who was diagnosed with a locally advanced gastric cancer several months ago.  She developed abdominal pain presumed secondary to reflux; however, proton pump inhibitors did not alleviate her pain.  For this reason, an EGD was performed that demonstrated an ulcerated mass in the body of the stomach; biopsies returned adenocarcinoma.  On pre-operative imaging, there was thickening of the stomach along with enlarged, malignant appearing lymph nodes along the gastroepiploic arcade.  She was taken for a diagnostic laparoscopy after her diagnosis that was negative for peritoneal or liver metastases.  Peritoneal washings were also negative for carcinoma.  She was started on perioperative chemotherapy using the FLOT regimen which she tolerated well.  She had a dramatic response to therapy on restaging scans.  I counseled the patient as to the need for gastrectomy for curative resection of her cancer.  I explained to her the benefits and risks of the procedure, including but not limited to: bleeding, infection, injury to adjacent organs/structures, need for additional procedures or surgeries, delayed gastric emptying, anastomotic leak, need for feeding tube placement, cardiopulmonary complications, deep venous thrombosis, and  death.  The patient understood these risks and wished to proceed.    Procedure:   After informed consent was obtained, the patient was brought to the operating room and placed in the supine position.  General anesthesia was induced, and the patient was intubated without difficulty. A Truong catheter was placed along with bilateral TAP blocks.  The abdomen was prepped and draped in the standard sterile fashion.  Perioperative antibiotics were administered as appropriate.  A timeout was performed, indicating the patient's name and intended procedure.    Entry to the abdomen was obtained via a midline abdominal incision carried thru the fascia and into the peritoneum from the xiphoid to below the umbilicus.  A Bookwalter retractor was placed for operative exposure.  Survey of the abdomen demonstrated no peritoneal or liver metastases.  There was a golf-ball sized mass in the body of the stomach with invasion into the transverse mesocolon; however, the remainder of the stomach was free of tumor.  The operation was begun by excising the omentum using the Harmonic scalpel beginning at the midpoint of the transverse colon towards the spleen.  The left gastroepiploic artery and vein were ligated between 2-0 silk ties.  The remainder of the omentum was excised beginning at the midpoint of the transverse colon towards the right gastroepiploic vessels.  The right gastroepiploic arcade was ligated between 2-0 silk ties and transected.  The dissection was continued by ligating the right gastric artery and continued to the left gastric artery.  These vessels were ligated between 2-0 silk ties.  A point approximately 5 cm proximal to the mass was chosen as the point of proximal transection.  There was sufficient distance of the proximal margin away from the gastroesophageal junction such that a subtotal gastrectomy could be performed.  The stomach was transected using two firings of a 75 mm green load of the EHRVE stapler. The tumor  was adherent to the transverse mesocolon at a location away from the middle colic vessels.  The Harmonic scalpel was used to incise this portion of the transverse colon mesentery so as to remove it en bloc with the tumor.  This created a rather large defect in the transverse mesocolon given the patient had a very redundant transverse colon.  The distal margin was chosen at a point past the pylorus on the first portion of the duodenum.  The duodenum was transected using a blue load of the HERVE stapler.  The duodenal stump was oversewn with 3-0 silk Lembert sutures.  The specimen was then removed from the field.  Next, a D2 lymphadenectomy was performed.  A location on the proper hepatic artery past the takeoff of the gastroduodenal artery was chosen as the starting point for the lymphadenectomy.  Using the Harmonic scalpel, the terrance tissue from the proper hepatic artery up to and including a small portion of the splenic artery was excised and sent as a separate specimen.  Hemostasis was excellent.      At this point, the reconstruction was begun.  Given the sizable transverse mesocolon defect created when removing the tumor, a retrocolic anastomosis was chosen.  A loop of jejunum just distal to the ligament of Treitz was brought through this defect and a hand-sewn loop gastrojejunostomy was created using 3-0 Vicryl in an isoperistaltic fashion.  The back row of the anastomosis, along with the anterior layer, was fashioned with a seromuscular layer of 3-0 silk.  The staple line on the remainder of the stomach (not used for the anastomosis) was oversewn with 3-0 silk Lembert sutures.  A three point seromuscular suture was taken at the intersection of the staple line and newly created anastomosis.  The NGT was placed thru the anastomosis and secured at the nose.  Given the large size of the defect in the transverse mesocolon, the defect was not closed.  Examination of the anastomosis revealed it to be patent.  The  abdomen was irrigated with several liters of normal saline.  Hemostasis was excellent.  The fascia was closed with #1 PDS in a running fashion.  The skin was reapproximated with staples.  The incision was cleansed and dried.  A sterile dressing was applied.  All lap, sponge, and needle counts were noted to be correct at the end of the case.  The patient was transported to the recovery unit in stable condition.  Of note, Dr. Seymour Chin assisted with all aspects of the procedure.      Nicole Crooks MD  07/01/18  7:07 PM

## 2018-07-01 NOTE — PLAN OF CARE
Problem: Patient Care Overview  Goal: Plan of Care Review   06/30/18 1640 06/30/18 2200   Plan of Care Review   Progress improving --    Coping/Psychosocial   Plan of Care Reviewed With --  patient;family     Goal: Discharge Needs Assessment  Outcome: Ongoing (interventions implemented as appropriate)   06/29/18 0900 06/29/18 1600 06/30/18 0425   Discharge Needs Assessment   Readmission Within the Last 30 Days --  --  no previous admission in last 30 days   Concerns to be Addressed --  --  denies needs/concerns at this time   Patient/Family Anticipates Transition to --  home with family --    Patient/Family Anticipated Services at Transition --  none --    Transportation Concerns --  car, none --    Transportation Anticipated --  family or friend will provide --    Anticipated Changes Related to Illness --  --  none   Equipment Needed After Discharge --  --  none   Disability   Equipment Currently Used at Home none --  --      Goal: Interprofessional Rounds/Family Conf  Outcome: Ongoing (interventions implemented as appropriate)   06/30/18 0425   Interdisciplinary Rounds/Family Conf   Participants nursing

## 2018-07-01 NOTE — PROGRESS NOTES
"General Surgery Post op Follow Up Note    Subjective:   No new complaints.     /64 (BP Location: Left arm, Patient Position: Lying)   Pulse 84   Temp 98 °F (36.7 °C) (Oral)   Resp 16   Ht 157.5 cm (62\")   Wt 82.2 kg (181 lb 3.5 oz)   SpO2 99%   BMI 33.15 kg/m²     General Appearance:  in no acute distress  Abdomen: incision is clean and dry    CBC    Results from last 7 days  Lab Units 06/30/18  0404   WBC 10*3/mm3 10.65   HEMOGLOBIN g/dL 10.4*   HEMATOCRIT % 34.6   PLATELETS 10*3/mm3 201       CMP/BMP    Results from last 7 days  Lab Units 06/30/18  0404 06/25/18  1021   SODIUM mmol/L 139 140   POTASSIUM mmol/L 4.1 4.0   CHLORIDE mmol/L 107 109   CO2 mmol/L 25.0 24.0   BUN mg/dL 7* 14   CREATININE mg/dL 0.73 0.75   CALCIUM mg/dL 8.4* 8.9   BILIRUBIN mg/dL  --  0.4   ALK PHOS U/L  --  80   ALT (SGPT) U/L  --  28   AST (SGOT) U/L  --  30   GLUCOSE mg/dL 105* 109*         ASSESSMENT/PLAN:    NPO/NGT.  Awaiting GI function.  Mobilize.    Trip Anderson MD  7/1/2018  1:09 PM  "

## 2018-07-01 NOTE — PLAN OF CARE
Problem: Patient Care Overview  Goal: Plan of Care Review   07/01/18 5063   Plan of Care Review   Progress improving   Coping/Psychosocial   Plan of Care Reviewed With patient   OTHER   Outcome Summary VSS, pt well controlled with dilaudid pca and toradol, pt up in chair and ambulating in hallway today,ng to low wall suction.

## 2018-07-02 PROCEDURE — 25010000002 KETOROLAC TROMETHAMINE PER 15 MG: Performed by: SURGERY

## 2018-07-02 PROCEDURE — 25010000002 ENOXAPARIN PER 10 MG: Performed by: SURGERY

## 2018-07-02 RX ORDER — KETOROLAC TROMETHAMINE 30 MG/ML
30 INJECTION, SOLUTION INTRAMUSCULAR; INTRAVENOUS ONCE AS NEEDED
Status: COMPLETED | OUTPATIENT
Start: 2018-07-02 | End: 2018-07-02

## 2018-07-02 RX ADMIN — KETOROLAC TROMETHAMINE 30 MG: 30 INJECTION, SOLUTION INTRAMUSCULAR at 01:54

## 2018-07-02 RX ADMIN — POTASSIUM CHLORIDE, DEXTROSE MONOHYDRATE AND SODIUM CHLORIDE 50 ML/HR: 150; 5; 450 INJECTION, SOLUTION INTRAVENOUS at 15:18

## 2018-07-02 RX ADMIN — PANTOPRAZOLE SODIUM 40 MG: 40 INJECTION, POWDER, FOR SOLUTION INTRAVENOUS at 05:49

## 2018-07-02 RX ADMIN — ENOXAPARIN SODIUM 40 MG: 40 INJECTION SUBCUTANEOUS at 08:19

## 2018-07-02 RX ADMIN — KETOROLAC TROMETHAMINE 30 MG: 30 INJECTION, SOLUTION INTRAMUSCULAR at 15:04

## 2018-07-02 RX ADMIN — POTASSIUM CHLORIDE, DEXTROSE MONOHYDRATE AND SODIUM CHLORIDE 100 ML/HR: 150; 5; 450 INJECTION, SOLUTION INTRAVENOUS at 03:38

## 2018-07-02 NOTE — PROGRESS NOTES
Discharge Planning Assessment  [facility]     Patient Name: Astrid Nobles  MRN: [id number]  Today's Date: 7/2/2018    Admit Date: 6/29/2018          Discharge Needs Assessment     Row Name 07/02/18 1528       Living Environment    Lives With child(kenny), adult;child(kenny), dependent    Current Living Arrangements home/apartment/condo    Primary Care Provided by self    Provides Primary Care For no one    Family Caregiver if Needed child(kenny), adult;sibling(s);parent(s)    Quality of Family Relationships helpful;supportive    Able to Return to Prior Arrangements yes       Resource/Environmental Concerns    Resource/Environmental Concerns none    Transportation Concerns car, none       Transition Planning    Patient/Family Anticipates Transition to home with family    Patient/Family Anticipated Services at Transition none    Transportation Anticipated family or friend will provide       Discharge Needs Assessment    Readmission Within the Last 30 Days no previous admission in last 30 days    Concerns to be Addressed denies needs/concerns at this time    Equipment Currently Used at Home none    Anticipated Changes Related to Illness none    Equipment Needed After Discharge none            Discharge Plan     Row Name 07/02/18 1536       Plan    Plan IDP    Patient/Family in Agreement with Plan yes    Plan Comments Pt lives in [facility] w adult/[facility] children. Family can transport home. Pt uses no DME/HH/PT/OT. Pt PcP is Dr Elias Suero. Her insurance covers her medications. Pt goal is to return home in car w family. No needs at this time. CM will follow.    Final Discharge Disposition Code 01 - home or self-care        Destination     No service coordination in this encounter.      Durable Medical Equipment     No service coordination in this encounter.      Dialysis/Infusion     No service coordination in this encounter.      Home Medical Care     No service coordination in this encounter.      Social Care      No service coordination in this encounter.                Demographic Summary     Row Name 07/02/18 1527       General Information    Admission Type inpatient    Arrived From home    Referral Source admission list    Reason for Consult discharge planning    Preferred Language English       Contact Information    Permission Granted to Share Info With ;family/designee    Contact Information Comments Anay (sravani) [phone]            Functional Status     Row Name 07/02/18 1528       Functional Status    Usual Activity Tolerance moderate       Functional Status, IADL    Medications independent    Meal Preparation independent    Housekeeping independent    Laundry independent    Shopping independent       Employment/    Employment Status employed part time            Psychosocial    No documentation.           Abuse/Neglect    No documentation.           Legal    No documentation.           Substance Abuse    No documentation.           Patient Forms    No documentation.         Reema Ugalde, RN

## 2018-07-02 NOTE — PROGRESS NOTES
Clinical Nutrition   Reason For Visit: NPO/Clear liquid >3 days    Patient Name: Astrid Nobles  YOB: 1965  MRN: [id number]  Date of Encounter: 07/02/18 12:59 PM  Admission date: 6/29/2018      Nutrition Assessment     Hospital Problem List  Active Problems:    Mass of stomach  Per MD 7/2/2018  Assessment  POD #2 for 53 year old female s/p subtotal gastrectomy with BII anastomsis for gastric cancer.  NGT removed at bedside.      Plan:   - NPO with ice chips/sips        PMH: She  has a past medical history of Abdominal pain; Acid reflux; Gastric cancer; Gastric mass; Gastric ulcer; History of transfusion; and Wears glasses.   PSxH: She  has a past surgical history that includes Cervical biopsy w/ loop electrode excision; Tubal ligation; Laparoscopy (N/A, 3/13/2018); Venous Access Device (Port); Esophagogastroduodenoscopy; and Gastrectomy (N/A, 6/29/2018).           Reported/Observed/Food/Nutrition Related History     Patient has no specific food requests. She is receptive to boost breeze per appropriate diet order.      Anthropometrics   Height: 157.5 cm  Weight: 82.2 kg (standing scale)  BMI: 33.15  BMI classification: Obese Class I: 30-34.9kg/m2   UBW: 180 lbs-stated per patient         Labs reviewed   Labs reviewed: Yes    Results from last 7 days  Lab Units 06/30/18  0404   SODIUM mmol/L 139   POTASSIUM mmol/L 4.1   CHLORIDE mmol/L 107   CO2 mmol/L 25.0   BUN mg/dL 7*   CREATININE mg/dL 0.73   GLUCOSE mg/dL 105*   CALCIUM mg/dL 8.4*       Results from last 7 days  Lab Units 06/30/18  0404 06/29/18  1548   WBC 10*3/mm3 10.65 8.41           Lab Results  Lab Value Date/Time   HGBA1C 5.80 (H) 06/25/2018 1021     Medications reviewed   Medications reviewed: Yes    Intake/Ouptut 24 hrs (7:00AM - 6:59 AM)     Intake & Output (last day)       07/01 0701 - 07/02 0700 07/02 0701 - 07/03 0700    P.O. 60     I.V. (mL/kg) 2545.7 (31)     Other 45     Total Intake(mL/kg) 2650.7 (32.2)     Urine (mL/kg/hr) 1425  (0.7)     Emesis/NG output 60 (0)     Total Output 1485      Net +1165.7                    Current Nutrition Prescription   PO: NPO Diet NPO Except: Ice chips      Nutrition Diagnosis     Problem Inadequate oral intake   Etiology Clinical condition   Signs/Symptoms NPO > 72 hours     Intervention   Intervention: Follow treatment progress, Care plan reviewed, Interview for preferences   Per appropriate diet order, initiate boost breeze every meal.      Goal:   General: Nutrition support treatment  PO:       Monitoring/Evaluation:       Monitoring/Evaluation: Per protocol, GI status  Will Continue to follow per protocol  Luzma Castrejon RD  Time Spent: 20 minutes

## 2018-07-02 NOTE — PLAN OF CARE
Problem: Patient Care Overview  Goal: Plan of Care Review  Outcome: Ongoing (interventions implemented as appropriate)   07/02/18 0402   Plan of Care Review   Progress no change   Coping/Psychosocial   Plan of Care Reviewed With patient   OTHER   Outcome Summary Pain controlled with sceduled meds and PCA. VSS. Small amount of drainage from incision. Dressing reinforced with ADB pad.       Problem: Surgery Nonspecified (Adult)  Goal: Signs and Symptoms of Listed Potential Problems Will be Absent, Minimized or Managed (Surgery Nonspecified)  Outcome: Ongoing (interventions implemented as appropriate)   07/02/18 0402   Goal/Outcome Evaluation   Problems Assessed (Surgery) all   Problems Present (Surgery) pain;bowel motility decreased

## 2018-07-02 NOTE — PROGRESS NOTES
"General Surgery Daily Progress Note    Subjective:  No acute events.  Pain well controlled.    Objective:  /80 (BP Location: Left arm, Patient Position: Lying)   Pulse 74   Temp 97.6 °F (36.4 °C) (Oral)   Resp 16   Ht 157.5 cm (62\")   Wt 82.2 kg (181 lb 3.5 oz)   SpO2 98%   BMI 33.15 kg/m²     Physical Exam:  General: NAD, AAO x 3   Abdomen:  Soft, NT, ND.  Incision: c/d/i.        Imaging Results (last 24 hours)     ** No results found for the last 24 hours. **          CBC:    Results from last 7 days  Lab Units 06/30/18  0404   WBC 10*3/mm3 10.65   HEMOGLOBIN g/dL 10.4*   HEMATOCRIT % 34.6   PLATELETS 10*3/mm3 201       CMP:    Results from last 7 days  Lab Units 06/30/18  0404 06/25/18  1021   SODIUM mmol/L 139 140   POTASSIUM mmol/L 4.1 4.0   CHLORIDE mmol/L 107 109   CO2 mmol/L 25.0 24.0   BUN mg/dL 7* 14   CREATININE mg/dL 0.73 0.75   CALCIUM mg/dL 8.4* 8.9   BILIRUBIN mg/dL  --  0.4   ALK PHOS U/L  --  80   ALT (SGPT) U/L  --  28   AST (SGOT) U/L  --  30   GLUCOSE mg/dL 105* 109*         Assessment  POD #2 for 53 year old female s/p subtotal gastrectomy with BII anastomsis for gastric cancer.  NGT removed at bedside.     Plan:   - NPO with ice chips/sips  - ARBF  - Continue current pain regimen    Nicole Crooks MD - 7/2/2018, 9:10 AM          "

## 2018-07-03 ENCOUNTER — APPOINTMENT (OUTPATIENT)
Dept: GENERAL RADIOLOGY | Facility: HOSPITAL | Age: 53
End: 2018-07-03

## 2018-07-03 LAB
ABO + RH BLD: NORMAL
ANION GAP SERPL CALCULATED.3IONS-SCNC: 8 MMOL/L (ref 3–11)
BH BB BLOOD EXPIRATION DATE: NORMAL
BH BB BLOOD TYPE BARCODE: 6200
BH BB DISPENSE STATUS: NORMAL
BH BB PRODUCT CODE: NORMAL
BH BB UNIT NUMBER: NORMAL
BUN BLD-MCNC: <5 MG/DL (ref 9–23)
BUN/CREAT SERPL: ABNORMAL (ref 7–25)
CALCIUM SPEC-SCNC: 9.1 MG/DL (ref 8.7–10.4)
CHLORIDE SERPL-SCNC: 105 MMOL/L (ref 99–109)
CO2 SERPL-SCNC: 26 MMOL/L (ref 20–31)
CREAT BLD-MCNC: 0.65 MG/DL (ref 0.6–1.3)
CROSSMATCH INTERPRETATION: NORMAL
DEPRECATED RDW RBC AUTO: 71 FL (ref 37–54)
ERYTHROCYTE [DISTWIDTH] IN BLOOD BY AUTOMATED COUNT: 27.1 % (ref 11.3–14.5)
GFR SERPL CREATININE-BSD FRML MDRD: 116 ML/MIN/1.73
GLUCOSE BLD-MCNC: 99 MG/DL (ref 70–100)
HCT VFR BLD AUTO: 30.8 % (ref 34.5–44)
HGB BLD-MCNC: 9.5 G/DL (ref 11.5–15.5)
MCH RBC QN AUTO: 22.4 PG (ref 27–31)
MCHC RBC AUTO-ENTMCNC: 30.8 G/DL (ref 32–36)
MCV RBC AUTO: 72.5 FL (ref 80–99)
PLATELET # BLD AUTO: 211 10*3/MM3 (ref 150–450)
POTASSIUM BLD-SCNC: 3.9 MMOL/L (ref 3.5–5.5)
RBC # BLD AUTO: 4.25 10*6/MM3 (ref 3.89–5.14)
SODIUM BLD-SCNC: 139 MMOL/L (ref 132–146)
UNIT  ABO: NORMAL
UNIT  RH: NORMAL
WBC NRBC COR # BLD: 5.49 10*3/MM3 (ref 3.5–10.8)

## 2018-07-03 PROCEDURE — 85027 COMPLETE CBC AUTOMATED: CPT | Performed by: SURGERY

## 2018-07-03 PROCEDURE — 80048 BASIC METABOLIC PNL TOTAL CA: CPT | Performed by: SURGERY

## 2018-07-03 PROCEDURE — 25010000002 ENOXAPARIN PER 10 MG: Performed by: SURGERY

## 2018-07-03 PROCEDURE — 73030 X-RAY EXAM OF SHOULDER: CPT

## 2018-07-03 PROCEDURE — 25010000002 KETOROLAC TROMETHAMINE PER 15 MG: Performed by: SURGERY

## 2018-07-03 PROCEDURE — 25010000003 HYDROMORPHONE PER 4 MG: Performed by: SURGERY

## 2018-07-03 RX ORDER — KETOROLAC TROMETHAMINE 30 MG/ML
30 INJECTION, SOLUTION INTRAMUSCULAR; INTRAVENOUS EVERY 8 HOURS
Status: COMPLETED | OUTPATIENT
Start: 2018-07-03 | End: 2018-07-04

## 2018-07-03 RX ADMIN — ENOXAPARIN SODIUM 40 MG: 40 INJECTION SUBCUTANEOUS at 09:30

## 2018-07-03 RX ADMIN — HYDROMORPHONE HYDROCHLORIDE: 10 INJECTION INTRAMUSCULAR; INTRAVENOUS; SUBCUTANEOUS at 03:52

## 2018-07-03 RX ADMIN — PANTOPRAZOLE SODIUM 40 MG: 40 INJECTION, POWDER, FOR SOLUTION INTRAVENOUS at 05:40

## 2018-07-03 RX ADMIN — KETOROLAC TROMETHAMINE 30 MG: 30 INJECTION, SOLUTION INTRAMUSCULAR at 11:41

## 2018-07-03 RX ADMIN — KETOROLAC TROMETHAMINE 30 MG: 30 INJECTION, SOLUTION INTRAMUSCULAR at 18:38

## 2018-07-03 NOTE — PLAN OF CARE
"Problem: Patient Care Overview  Goal: Plan of Care Review  Outcome: Ongoing (interventions implemented as appropriate)   07/03/18 6567   Plan of Care Review   Progress improving   Coping/Psychosocial   Plan of Care Reviewed With patient   OTHER   Outcome Summary Pt reports \"passing gas\". Incisional pain is tolerated. Minimal use of PCA. New shoulder pain is not relieved by PCA or heating pad.        Problem: Surgery Nonspecified (Adult)  Goal: Signs and Symptoms of Listed Potential Problems Will be Absent, Minimized or Managed (Surgery Nonspecified)  Outcome: Ongoing (interventions implemented as appropriate)   07/03/18 4777   Goal/Outcome Evaluation   Problems Assessed (Surgery) all   Problems Present (Surgery) pain;bowel motility decreased         "

## 2018-07-03 NOTE — PROGRESS NOTES
"General Surgery Daily Progress Note    Subjective:  Complains of right shoulder pain, worsened over night.  Difficult to raise arm, secondary to pain.  (+) flatus.    Objective:  /91 (BP Location: Left arm, Patient Position: Lying)   Pulse 77   Temp 98.1 °F (36.7 °C) (Oral)   Resp 16   Ht 157.5 cm (62\")   Wt 82.2 kg (181 lb 3.5 oz)   SpO2 100%   BMI 33.15 kg/m²     Physical Exam:  General: NAD, AAO x 3   Abdomen:  Soft, NT, ND.  Incision: c/d/i      Imaging Results (last 24 hours)     ** No results found for the last 24 hours. **          CBC:    Results from last 7 days  Lab Units 07/03/18  0550   WBC 10*3/mm3 5.49   HEMOGLOBIN g/dL 9.5*   HEMATOCRIT % 30.8*   PLATELETS 10*3/mm3 211       CMP:    Results from last 7 days  Lab Units 07/03/18  0550   SODIUM mmol/L 139   POTASSIUM mmol/L 3.9   CHLORIDE mmol/L 105   CO2 mmol/L 26.0   BUN mg/dL <5*   CREATININE mg/dL 0.65   CALCIUM mg/dL 9.1   GLUCOSE mg/dL 99         Assessment  POD #4 for 53 year old female s/p subtotal gastrectomy with BII anastomsis for gastric cancer. Will advance to clear liquids today.  Will also order shoulder x-ray given persistent pain.      Plan:   - Resume toradol  - Clear liquids  - Shoulder X-ray  - HLIVF    Nicole Crooks MD - 7/3/2018, 8:55 AM          "

## 2018-07-03 NOTE — PLAN OF CARE
Problem: Patient Care Overview  Goal: Plan of Care Review  Outcome: Ongoing (interventions implemented as appropriate)   07/03/18 0417 07/03/18 0800 07/03/18 1607   Plan of Care Review   Progress improving --  --    Coping/Psychosocial   Plan of Care Reviewed With --  patient --    OTHER   Outcome Summary --  --  VSS. Pt has been c/o frequent pain in shoulder that hasn't been relieved by PCA or PRN meds. No c/o incisional pain.      Goal: Interprofessional Rounds/Family Conf  Outcome: Ongoing (interventions implemented as appropriate)   07/03/18 1607   Interdisciplinary Rounds/Family Conf   Participants nursing       Problem: Pain, Acute (Adult)  Goal: Identify Related Risk Factors and Signs and Symptoms  Outcome: Ongoing (interventions implemented as appropriate)   07/03/18 1607   Pain, Acute (Adult)   Related Risk Factors (Acute Pain) surgery   Signs and Symptoms (Acute Pain) fatigue/weakness;facial mask of pain/grimace;verbalization of pain descriptors     Goal: Acceptable Pain Control/Comfort Level  Outcome: Ongoing (interventions implemented as appropriate)   07/03/18 1607   Pain, Acute (Adult)   Acceptable Pain Control/Comfort Level making progress toward outcome

## 2018-07-04 PROCEDURE — 25010000002 ENOXAPARIN PER 10 MG: Performed by: SURGERY

## 2018-07-04 PROCEDURE — 25010000002 KETOROLAC TROMETHAMINE PER 15 MG: Performed by: SURGERY

## 2018-07-04 PROCEDURE — 25010000003 HYDROMORPHONE PER 4 MG: Performed by: SURGERY

## 2018-07-04 RX ORDER — IBUPROFEN 600 MG/1
600 TABLET ORAL EVERY 6 HOURS PRN
Status: DISCONTINUED | OUTPATIENT
Start: 2018-07-04 | End: 2018-07-05 | Stop reason: HOSPADM

## 2018-07-04 RX ORDER — OXYCODONE HYDROCHLORIDE AND ACETAMINOPHEN 5; 325 MG/1; MG/1
2 TABLET ORAL EVERY 6 HOURS PRN
Status: DISCONTINUED | OUTPATIENT
Start: 2018-07-04 | End: 2018-07-05 | Stop reason: HOSPADM

## 2018-07-04 RX ORDER — OXYCODONE HYDROCHLORIDE AND ACETAMINOPHEN 5; 325 MG/1; MG/1
1 TABLET ORAL EVERY 6 HOURS PRN
Status: DISCONTINUED | OUTPATIENT
Start: 2018-07-04 | End: 2018-07-05 | Stop reason: HOSPADM

## 2018-07-04 RX ADMIN — HYDROMORPHONE HYDROCHLORIDE: 10 INJECTION INTRAMUSCULAR; INTRAVENOUS; SUBCUTANEOUS at 03:45

## 2018-07-04 RX ADMIN — OXYCODONE HYDROCHLORIDE AND ACETAMINOPHEN 1 TABLET: 5; 325 TABLET ORAL at 10:48

## 2018-07-04 RX ADMIN — ENOXAPARIN SODIUM 40 MG: 40 INJECTION SUBCUTANEOUS at 08:22

## 2018-07-04 RX ADMIN — PANTOPRAZOLE SODIUM 40 MG: 40 INJECTION, POWDER, FOR SOLUTION INTRAVENOUS at 06:09

## 2018-07-04 RX ADMIN — OXYCODONE HYDROCHLORIDE AND ACETAMINOPHEN 1 TABLET: 5; 325 TABLET ORAL at 20:29

## 2018-07-04 RX ADMIN — KETOROLAC TROMETHAMINE 30 MG: 30 INJECTION, SOLUTION INTRAMUSCULAR at 02:10

## 2018-07-04 NOTE — PROGRESS NOTES
"General Surgery Daily Progress Note    Subjective:  No acute events.   (+) flatus.  No nausea, emesis, or belching with liquids.  Right shoulder pain improved.    Objective:  /84   Pulse 77   Temp 98.4 °F (36.9 °C) (Oral)   Resp 14   Ht 157.5 cm (62\")   Wt 82.2 kg (181 lb 3.5 oz)   SpO2 97%   BMI 33.15 kg/m²     Physical Exam:  General: NAD, AAO x 3   Abdomen:  Soft, NT, ND.  Incision: c/d/i.      Imaging Results (last 24 hours)     Procedure Component Value Units Date/Time    XR Shoulder 2+ View Right [[id number]] Collected:  07/03/18 1322     Updated:  07/03/18 1322    Narrative:       EXAMINATION: XR SHOULDER 2+ VW RIGHT-      INDICATION: Right shoulder pain after abdominal surgery; K31.9-Disease  of stomach and duodenum, unspecified.      COMPARISON: None.     FINDINGS: Two views of the right shoulder reveal degenerative changes  seen in the acromioclavicular joint. Calcification is seen in the region  of the supraspinatal tendon suggesting possibly calcific tendinitis.  There is no fracture or dislocation. The cortex is intact.  Port-A-Catheter is on the right.       Impression:       Degenerative changes seen within the shoulder with  calcification seen in the region of the supraspinatus tendon suggesting  possibly calcific tendinitis. There is no acute bony abnormality  present.     D:  07/03/2018  E:  07/03/2018             CBC:    Results from last 7 days  Lab Units 07/03/18  0550   WBC 10*3/mm3 5.49   HEMOGLOBIN g/dL 9.5*   HEMATOCRIT % 30.8*   PLATELETS 10*3/mm3 211       CMP:    Results from last 7 days  Lab Units 07/03/18  0550   SODIUM mmol/L 139   POTASSIUM mmol/L 3.9   CHLORIDE mmol/L 105   CO2 mmol/L 26.0   BUN mg/dL <5*   CREATININE mg/dL 0.65   CALCIUM mg/dL 9.1   GLUCOSE mg/dL 99         Assessment  POD #5 for 53 year old female s/p subtotal gastrectomy with BII anastomsis for gastric cancer. Will advance to gastrectomy diet today.  Anticipate discharge home tomorrow if tolerates " solids.     Plan:   - Discontinue PCA  - Oral pain medications  - Advance diet  - Anticipate discharge home tomorrow  Nicole Crooks MD - 7/4/2018, 9:39 AM

## 2018-07-04 NOTE — PLAN OF CARE
Problem: Patient Care Overview  Goal: Plan of Care Review  Outcome: Ongoing (interventions implemented as appropriate)   07/04/18 1626   Plan of Care Review   Progress improving   Coping/Psychosocial   Plan of Care Reviewed With patient   OTHER   Outcome Summary VSS. Pt has still had c/o in her right arm. D/C PCA pump no complaints of incisional pain. Has been moved to a reg. post op gastrectomy diet w/ an added boost with meals.      Goal: Interprofessional Rounds/Family Conf  Outcome: Ongoing (interventions implemented as appropriate)   07/04/18 1626   Interdisciplinary Rounds/Family Conf   Participants nursing       Problem: Surgery Nonspecified (Adult)  Intervention: Support Surgical and Anesthesia Recovery   07/04/18 0822 07/04/18 1500   Incentive Spirometer (IS)   Administration (IS) self-administered --    Patient Tolerance (IS) good --    Safety Management   Safety Promotion/Fall Prevention --  activity supervised;fall prevention program maintained;nonskid shoes/slippers when out of bed;safety round/check completed     Intervention: Monitor/Manage Pain   07/03/18 2020 07/04/18 1626   Promote Oxygenation/Perfusion   Pain Management Interventions --  see MAR;pillow support provided;pain management plan reviewed with patient/caregiver   Safety Management   Medication Review/Management medications reviewed --      Intervention: Prevent/Manage Postoperative Nausea and Vomiting   07/04/18 0822 07/04/18 1400   Positioning   Head of Bed (HOB) --  HOB at 60-90 degrees   Monitor/Manage Hypovolemia   Nausea/Vomiting Interventions nausea triggers minimized --      Intervention: Prevent/Manage Impaired Postoperative Elimination   07/04/18 0822   Promote Effective Bowel Elimination   Bowel Intervention adequate fluid intake promoted;diet adjusted     Intervention: Prevent/Manage DVT/VTE Risk   07/04/18 1400   Interventions   VTE Prevention/Management patient refused intervention     Intervention: Optimize Psychosocial  Response to the Surgical Experience   07/04/18 0822   Interventions   Trust Relationship/Rapport care explained;choices provided;emotional support provided;empathic listening provided;thoughts/feelings acknowledged       Goal: Signs and Symptoms of Listed Potential Problems Will be Absent, Minimized or Managed (Surgery Nonspecified)  Outcome: Ongoing (interventions implemented as appropriate)   07/03/18 0417 07/04/18 1626   Goal/Outcome Evaluation   Problems Assessed (Surgery) --  all   Problems Present (Surgery) pain;bowel motility decreased --        Problem: Pain, Acute (Adult)  Intervention: Monitor and Manage Analgesia   07/03/18 2020 07/04/18 0822   Promote Effective Bowel Elimination   Bowel Intervention --  adequate fluid intake promoted;diet adjusted   Safety Management   Medication Review/Management medications reviewed --      Intervention: Mutually Develop/Implement Acute Pain Management Plan   07/04/18 1626   Promote Oxygenation/Perfusion   Pain Management Interventions see MAR;pillow support provided;pain management plan reviewed with patient/caregiver     Intervention: Support/Optimize Psychosocial Response to Acute Pain   07/04/18 0822   Interventions   Trust Relationship/Rapport care explained;choices provided;emotional support provided;empathic listening provided;thoughts/feelings acknowledged   Psychosocial Support   Diversional Activities television   Family/Support System Care support provided       Goal: Identify Related Risk Factors and Signs and Symptoms  Outcome: Ongoing (interventions implemented as appropriate)   07/03/18 1607   Pain, Acute (Adult)   Related Risk Factors (Acute Pain) surgery   Signs and Symptoms (Acute Pain) fatigue/weakness;facial mask of pain/grimace;verbalization of pain descriptors     Goal: Acceptable Pain Control/Comfort Level  Outcome: Ongoing (interventions implemented as appropriate)   07/04/18 0512   Pain, Acute (Adult)   Acceptable Pain Control/Comfort Level  making progress toward outcome

## 2018-07-04 NOTE — PROGRESS NOTES
Clinical Nutrition   Reason For Visit: Follow-up protocol    Patient Name: Astrid Nobles  YOB: 1965  MRN: [id number]  Date of Encounter: 07/04/18 3:37 PM  Admission date: 6/29/2018    RD provided patient with post-gastrectomy nutrition education.      Nutrition Assessment     Hospital Problem List  Active Problems:    Mass of stomach      PMH: She  has a past medical history of Abdominal pain; Acid reflux; Gastric cancer (CMS/HCC); Gastric mass; Gastric ulcer; History of transfusion; and Wears glasses.   PSxH: She  has a past surgical history that includes Cervical biopsy w/ loop electrode excision; Tubal ligation; Laparoscopy (N/A, 3/13/2018); Venous Access Device (Port); Esophagogastroduodenoscopy; and Gastrectomy (N/A, 6/29/2018).       Other Applicable Diagnosis:  Gastric cancer s/p subtotal gastrectomy with bilroth II anastomosis and D2 lymphadenectomy (6/29)          Reported/Observed/Food/Nutrition Related History   Patient sitting up in chair, reports she just started solids at breakfast this morning. Tolerating solids okay. Requests Boost Breeze with meals. RD encouraged pt to consume these between meals if she is going to drink a different beverage during the meal. Also helped pt receive a different lunch tray.        Anthropometrics   Height: 157.5 cm  Weight: 82.2 kg (standing scale)  BMI: 33.15  BMI classification: Obese Class I: 30-34.9kg/m2   UBW: 180 lbs-stated per patient         Labs reviewed   Labs reviewed: Yes      Medications reviewed   Medications reviewed: Yes      Current Nutrition Prescription   PO: Diet Regular; Post Gastrectomy    Evaluation of Received Nutrient/Fluid Intake: insufficient data      Nutrition Diagnosis     Problem Food and nutrition knowledge deficit   Etiology GI surgery   Signs/Symptoms S/p gastrectomy; no prior need for edu       Intervention   Intervention: Follow treatment progress, Care plan reviewed, Interview for preferences, Menu provided,  Encourage intake, Education provided     Education: RD provided patient with both verbal edu and written materials regarding post-gastrectomy nutrition recommendations. Materials obtained from [employer]: Nutrition Care Manual. Patient asked appropriate questions and verbalized understanding. RD encouraged patient to ask questions as they arise during this admission.      Goal:   General: Nutrition support treatment  PO: Establish PO      Monitoring/Evaluation:       Monitoring/Evaluation: Per protocol, PO intake, Supplement intake  Will Continue to follow per protocol  Lucila Raza RD  Time Spent: 45 min

## 2018-07-04 NOTE — PROGRESS NOTES
Continued Stay Note  [facility]     Patient Name: Astrid Nobles  MRN: [id number]  Today's Date: 7/4/2018    Admit Date: 6/29/2018    Consent obtained for the participation in the [facility] Transitions Program.  Carla Cornejo RN        Discharge Plan    No documentation.             Discharge Codes    No documentation.       Expected Discharge Date and Time     Expected Discharge Date Expected Discharge Time    Jul 5, 2018             Carla Cornejo RN

## 2018-07-04 NOTE — PLAN OF CARE
Problem: Patient Care Overview  Goal: Plan of Care Review  Outcome: Ongoing (interventions implemented as appropriate)   07/04/18 0512   Plan of Care Review   Progress improving   Coping/Psychosocial   Plan of Care Reviewed With patient   OTHER   Outcome Summary VSS. Pt still c/o of pain in her right arm. possible D/C today. still has PCA pump. no c/o incisional pain       Problem: Surgery Nonspecified (Adult)  Goal: Signs and Symptoms of Listed Potential Problems Will be Absent, Minimized or Managed (Surgery Nonspecified)  Outcome: Ongoing (interventions implemented as appropriate)   07/03/18 0417   Goal/Outcome Evaluation   Problems Assessed (Surgery) all   Problems Present (Surgery) pain;bowel motility decreased       Problem: Pain, Acute (Adult)  Goal: Identify Related Risk Factors and Signs and Symptoms  Outcome: Ongoing (interventions implemented as appropriate)   07/03/18 1607   Pain, Acute (Adult)   Related Risk Factors (Acute Pain) surgery   Signs and Symptoms (Acute Pain) fatigue/weakness;facial mask of pain/grimace;verbalization of pain descriptors     Goal: Acceptable Pain Control/Comfort Level  Outcome: Ongoing (interventions implemented as appropriate)   07/04/18 0512   Pain, Acute (Adult)   Acceptable Pain Control/Comfort Level making progress toward outcome

## 2018-07-05 VITALS
BODY MASS INDEX: 33.35 KG/M2 | WEIGHT: 181.22 LBS | OXYGEN SATURATION: 98 % | RESPIRATION RATE: 18 BRPM | HEIGHT: 62 IN | HEART RATE: 77 BPM | SYSTOLIC BLOOD PRESSURE: 110 MMHG | TEMPERATURE: 98.2 F | DIASTOLIC BLOOD PRESSURE: 63 MMHG

## 2018-07-05 PROCEDURE — 25010000002 ENOXAPARIN PER 10 MG: Performed by: SURGERY

## 2018-07-05 RX ORDER — DOCUSATE SODIUM 100 MG/1
100 CAPSULE, LIQUID FILLED ORAL 2 TIMES DAILY
Status: DISCONTINUED | OUTPATIENT
Start: 2018-07-05 | End: 2018-07-05 | Stop reason: HOSPADM

## 2018-07-05 RX ORDER — PANTOPRAZOLE SODIUM 40 MG/1
40 TABLET, DELAYED RELEASE ORAL
Status: DISCONTINUED | OUTPATIENT
Start: 2018-07-06 | End: 2018-07-05 | Stop reason: HOSPADM

## 2018-07-05 RX ADMIN — OXYCODONE HYDROCHLORIDE AND ACETAMINOPHEN 1 TABLET: 5; 325 TABLET ORAL at 04:46

## 2018-07-05 RX ADMIN — ENOXAPARIN SODIUM 40 MG: 40 INJECTION SUBCUTANEOUS at 09:21

## 2018-07-05 RX ADMIN — PANTOPRAZOLE SODIUM 40 MG: 40 INJECTION, POWDER, FOR SOLUTION INTRAVENOUS at 05:42

## 2018-07-05 RX ADMIN — DOCUSATE SODIUM 100 MG: 100 CAPSULE, LIQUID FILLED ORAL at 09:21

## 2018-07-05 NOTE — DISCHARGE SUMMARY
General Surgery Discharge Note     Raf Suero MD    Patient Name:  Astrid Nobles  Admission Date:  6/29/2018  Discharge Date:  7/5/2018  Length of Stay:  6    Diagnosis/Problems:  Problem List Items Addressed This Visit     Mass of stomach    Relevant Orders    Tissue Pathology Exam (Completed)          History:  Mrs. Nobles is a 53-year-old female with a clinical stage III gastric adenocarcinoma of the body of the stomach.  She has undergone 4 cycles of chemotherapy with the FLOT regimen with an excellent treatment response.  Post of treatment EGD demonstrated no residual mucosal mass.  She was taken to the operating room on June 29, 2018 for a exploratory laparotomy, subtotal gastrectomy, and D2 lymphadenectomy.  Regions of the operation, please see the dictated operative report from that date.  Her postoperative course was uncomplicated.  Upon return of bowel function, her NG tube was discontinued and her diet was ultimately advanced to a postgastrectomy diet which she has tolerated.  She complained of right shoulder pain two days after the operation presumably secondary to hyperextension of the arm when positioning in the operating room.  Shoulder x-ray demonstrated no fracture, but there was concern for calcified tendinitis.  Her pain steadily improved with ibuprofen, heating pads, and movement.  Due to her good clinical condition, the patient will be discharged today.  She will follow-up with me in 1.5 weeks for staple removal.    Temp:  [98.2 °F (36.8 °C)-98.5 °F (36.9 °C)] 98.2 °F (36.8 °C)  Resp:  [16-18] 18  BP: (110)/(63) 110/63    Physical Exam:  General: NAD, AAO x 3   Abdomen:  Soft, NT, ND.  Incision: c/d/i.    Prescriptions:   1.  May resume all prior home medications.      Follow up in 1.5 weeks with Dr. Nicole Crooks at [facility] (ph: [phone]).    Nicole Crooks MD,  7/5/2018 - 11:04 AM

## 2018-07-05 NOTE — PLAN OF CARE
Problem: Patient Care Overview  Goal: Plan of Care Review   07/05/18 0237   Plan of Care Review   Progress improving       Problem: Pain, Acute (Adult)  Goal: Identify Related Risk Factors and Signs and Symptoms  Outcome: Ongoing (interventions implemented as appropriate)   07/05/18 0237   Pain, Acute (Adult)   Related Risk Factors (Acute Pain) surgery   Signs and Symptoms (Acute Pain) fatigue/weakness;guarding/abnormal posturing/positioning;verbalization of pain descriptors     Goal: Acceptable Pain Control/Comfort Level  Outcome: Ongoing (interventions implemented as appropriate)   07/05/18 0237   Pain, Acute (Adult)   Acceptable Pain Control/Comfort Level making progress toward outcome

## 2018-07-05 NOTE — PROGRESS NOTES
"General Surgery Daily Progress Note    Subjective:  No acute events.  Tolerating solids with flatus.  Pain well controlled.    Objective:  /63 (BP Location: Left arm, Patient Position: Lying)   Pulse 77   Temp 98.2 °F (36.8 °C) (Oral)   Resp 18   Ht 157.5 cm (62\")   Wt 82.2 kg (181 lb 3.5 oz)   SpO2 98%   BMI 33.15 kg/m²     Physical Exam:  General: NAD, AAO x 3   Abdomen:  Soft, NT, ND.  Incision: c/d/i.      Imaging Results (last 24 hours)     ** No results found for the last 24 hours. **          CBC:    Results from last 7 days  Lab Units 07/03/18  0550   WBC 10*3/mm3 5.49   HEMOGLOBIN g/dL 9.5*   HEMATOCRIT % 30.8*   PLATELETS 10*3/mm3 211       CMP:    Results from last 7 days  Lab Units 07/03/18  0550   SODIUM mmol/L 139   POTASSIUM mmol/L 3.9   CHLORIDE mmol/L 105   CO2 mmol/L 26.0   BUN mg/dL <5*   CREATININE mg/dL 0.65   CALCIUM mg/dL 9.1   GLUCOSE mg/dL 99         Assessment  POD #6 for 53 year old female s/p subtotal gastrectomy with BII anastomsis for gastric cancer. Tolerated solids.  Will discharge home today.    Plan:   - Stool softeners  - Home today      Nicole Crooks MD - 7/5/2018, 10:57 AM          "

## 2018-07-23 ENCOUNTER — TELEPHONE (OUTPATIENT)
Dept: ONCOLOGY | Facility: CLINIC | Age: 53
End: 2018-07-23

## 2018-07-23 NOTE — TELEPHONE ENCOUNTER
Patient states she needs to be off until she is finished with chemo.  Call transferred to phone room to schedule office visit for next week to make plans for the last portion of her treatment.  She should finish around the last week of September.  Message sent to Anna to follow up with her regarding documentation needed for her employer.

## 2018-07-23 NOTE — TELEPHONE ENCOUNTER
----- Message from Bonnie Joiner sent at 7/23/2018  2:03 PM EDT -----  Regarding: FAMILIA-RETURN TO WORK  Contact: [phone]  Patient called and her employer needs to know when she will be able to return work? They needs to edwina get an idea. Please call.

## 2018-07-24 ENCOUNTER — DOCUMENTATION (OUTPATIENT)
Dept: ONCOLOGY | Facility: CLINIC | Age: 53
End: 2018-07-24

## 2018-08-02 ENCOUNTER — INFUSION (OUTPATIENT)
Dept: ONCOLOGY | Facility: HOSPITAL | Age: 53
End: 2018-08-02

## 2018-08-02 ENCOUNTER — OFFICE VISIT (OUTPATIENT)
Dept: ONCOLOGY | Facility: CLINIC | Age: 53
End: 2018-08-02

## 2018-08-02 VITALS
TEMPERATURE: 97.5 F | HEIGHT: 62 IN | BODY MASS INDEX: 31.83 KG/M2 | HEART RATE: 70 BPM | DIASTOLIC BLOOD PRESSURE: 80 MMHG | SYSTOLIC BLOOD PRESSURE: 136 MMHG | RESPIRATION RATE: 16 BRPM | WEIGHT: 173 LBS

## 2018-08-02 DIAGNOSIS — C16.9 MALIGNANT NEOPLASM OF STOMACH, UNSPECIFIED LOCATION (HCC): Primary | ICD-10-CM

## 2018-08-02 PROCEDURE — 96523 IRRIG DRUG DELIVERY DEVICE: CPT

## 2018-08-02 PROCEDURE — 25010000002 HEPARIN FLUSH (PORCINE) 100 UNIT/ML SOLUTION: Performed by: INTERNAL MEDICINE

## 2018-08-02 PROCEDURE — 99214 OFFICE O/P EST MOD 30 MIN: CPT | Performed by: INTERNAL MEDICINE

## 2018-08-02 RX ORDER — SODIUM CHLORIDE 0.9 % (FLUSH) 0.9 %
10 SYRINGE (ML) INJECTION AS NEEDED
Status: DISCONTINUED | OUTPATIENT
Start: 2018-08-02 | End: 2018-08-02 | Stop reason: HOSPADM

## 2018-08-02 RX ORDER — DEXTROSE MONOHYDRATE 50 MG/ML
250 INJECTION, SOLUTION INTRAVENOUS ONCE
Status: CANCELLED | OUTPATIENT
Start: 2018-08-09

## 2018-08-02 RX ORDER — SODIUM CHLORIDE 0.9 % (FLUSH) 0.9 %
10 SYRINGE (ML) INJECTION AS NEEDED
Status: CANCELLED | OUTPATIENT
Start: 2018-08-02

## 2018-08-02 RX ORDER — OXYCODONE HYDROCHLORIDE AND ACETAMINOPHEN 5; 325 MG/1; MG/1
TABLET ORAL
Refills: 0 | COMMUNITY
Start: 2018-07-05 | End: 2018-10-11

## 2018-08-02 RX ORDER — PALONOSETRON 0.05 MG/ML
0.25 INJECTION, SOLUTION INTRAVENOUS ONCE
Status: CANCELLED | OUTPATIENT
Start: 2018-08-09

## 2018-08-02 RX ADMIN — HEPARIN 500 UNITS: 100 SYRINGE at 12:57

## 2018-08-02 RX ADMIN — Medication 10 ML: at 12:57

## 2018-08-02 NOTE — PROGRESS NOTES
CHIEF COMPLAINT: Management of gastric carcinoma    Problem List:     Gastric cancer (CMS/[facility])    2/6/2018 Initial Diagnosis     Gastric cancer.  53-year-old female with a three-month history of indigestion and upper abdominal pain.  She was started on omeprazole for presumed reflux, symptoms were not relieved.  She underwent an EGD on 2/6/2018 with Dr. Matt that revealed a large, friable and ulcerated mass in the body of the stomach.  Biopsies returned moderately to poorly differentiated adenocarcinoma.  Staging studies including CT scan of the chest abdomen and pelvis were negative for distant disease.  She underwent diagnostic laparoscopic with peritoneal washings to complete staging on 3/13/2018.  Baseline CBC 3/9/2018 WBC 4820, hemoglobin 8.5, hematocrit 28.9%, platelet count 351,000, MCV 64.8, MCH 19.1, MCHC 29.4.  CEA normal at less than 0.50.  HER-2/erik testing from gastric body biopsy was negative.  CMP was normal other than for serum calcium slightly decreased at 8.6.           2/6/2018 Procedure     EGD revealed gastric ulcerated mass in body of stomach.  Pathology returned:   Final Diagnosis    1. GASTRIC BODY BIOPSY:  Invasive moderate to poorly differentiated adenocarcinoma with ulceration (see comment).   2. GASTRIC ANTRUM BIOPSY:  Minimal chronic gastritis without activity.  No intestinal metaplasia or dysplasia identified.  No Helicobacter pylori-like organisms identified on routine stains.   HER2/erik testing negative  FLUORESCENCE IN-SITU HYBRIDIZATION (FISH) REPORT:  Negative/Not Amplified  HER2/WILL-17 Ratio: 1.3           2/9/2018 Imaging     CT abdomen/pelvis IMPRESSION:     There is mild thickening and prominence of the mid gastric wall of the  body of the stomach, middle third.     Extragastric mass is not identified. The lesser sac and retrogastric  spaces are clear.     Visceral tumor, adenopathy, stranding or caking is not currently  identified as described. There is no retroperitoneal  "or retrogastric  adenopathy. The lower lung zones are clear with no evidence of reactive  pleural effusion or occult mass. There is no liver metastasis and no  other acute focal CT abnormality is identified within the abdomen or  pelvis.     Incidental note is made of a nonobstructing stone right kidney right  lower pole.         3/9/2018 Imaging     CT Chest IMPRESSION:  No acute intrathoracic abnormality. No definite evidence of  metastatic disease.         3/13/2018 Procedure     Diagnostic laproscopy with biopsy and lysis of adhesions.  Pathology returned:  Final Diagnosis   PERITONEAL NODULE, EXCISIONAL BIOPSY:  Benign fibrotic nodule. (See comment)  JFJ/klb    Electronically signed by Boaz Jerez MD on 3/15/2018 at 1624   Comment See result below    The biopsy shows a lobulated fibrotic nodule with some intermixed lymphoid cells. The nodule does not appear neoplastic. One cannot entirely exclude a \"burned out\" lymph node, or other implant. There is no evidence of carcinoma.      Abdominal wall washings benign:  Final Diagnosis    1. ABDOMINAL WASH, WALL:  Negative for malignancy.  Scant cellularity; chronic inflammatory cells and scattered benign mesothelial cells.   2. ABDOMINAL WASH, WALL:  Negative for malignancy.  Scant cellularity; chronic inflammatory cells and scattered benign mesothelial cells.   3. ABDOMINAL WASH, WALL:  Negative for malignancy.  Benign mesothelial cells and mixed inflammatory cells.                5/9/2018 Genetic Testing     Genetic testing negative for cancer next panel         5/17/2018 Imaging     CT chest abdomen and pelvis showed Shrinkage of the gastric tumor with no evidence of metastasis         6/29/2018 Surgery     Surgery       Procedure:  Subtotal gastrectomy showing 0.7 cm 2 out of 10 node positive moderately differentiated adenocarcinoma of the stomach.  YpT2 N1 M0 stage IIa            HISTORY OF PRESENT ILLNESS:  The patient is a 53 y.o. female, here for follow up " on management of gastric ca status post neoadjuvant chemotherapy and subsequent gastrectomy.  She has healed from her surgery well.      Past Medical History:   Diagnosis Date   • Abdominal pain    • Acid reflux    • Gastric cancer (CMS/HCC)    • Gastric mass    • Gastric ulcer    • History of transfusion     no reaction 2018   • Wears glasses      Past Surgical History:   Procedure Laterality Date   • CERVICAL BIOPSY  W/ LOOP ELECTRODE EXCISION     • DIAGNOSTIC LAPAROSCOPY N/A 3/13/2018    Procedure: DIAGNOSTIC LAPAROSCOPY WITH BIOPSY, LYSIS OF ADHESIONS;  Surgeon: Nicole Crooks MD;  Location:  [facility];  Service: General   • ENDOSCOPY     • GASTRECTOMY N/A 6/29/2018    Procedure: SUBTOTAL GASTRECTOMY;  Surgeon: Nicole Crooks MD;  Location:  [facility];  Service: General   • TUBAL ABDOMINAL LIGATION     • VENOUS ACCESS DEVICE (PORT) INSERTION         No Known Allergies    Family History and Social History reviewed and changed as necessary      REVIEW OF SYSTEM:   Review of Systems   Constitutional: Negative for appetite change, chills, diaphoresis, fatigue, fever and unexpected weight change.   HENT:   Negative for mouth sores, sore throat and trouble swallowing.    Eyes: Negative for icterus.   Respiratory: Negative for cough, hemoptysis and shortness of breath.    Cardiovascular: Negative for chest pain, leg swelling and palpitations.   Gastrointestinal: Negative for abdominal distention, abdominal pain, blood in stool, constipation, diarrhea, nausea and vomiting.   Endocrine: Negative for hot flashes.   Genitourinary: Negative for bladder incontinence, difficulty urinating, dysuria, frequency and hematuria.    Musculoskeletal: Negative for gait problem, neck pain and neck stiffness.   Skin: Negative for rash.   Neurological: Negative for dizziness, gait problem, headaches, light-headedness and numbness.   Hematological: Negative for adenopathy. Does not bruise/bleed easily.   Psychiatric/Behavioral: Negative for  "depression. The patient is not nervous/anxious.    All other systems reviewed and are negative.       PHYSICAL EXAM    Vitals:    08/02/18 1149   BP: 136/80   Pulse: 70   Resp: 16   Temp: 97.5 °F (36.4 °C)   Weight: 78.5 kg (173 lb)   Height: 157.5 cm (62\")     Constitutional: Appears well-developed and well-nourished. No distress.   ECOG: (1) Restricted in physically strenuous activity, ambulatory and able to do work of light nature  HENT:   Head: Normocephalic.   Mouth/Throat: Oropharynx is clear and moist.   Eyes: Conjunctivae are normal. Pupils are equal, round, and reactive to light. No scleral icterus.   Neck: Neck supple. No JVD present. No thyromegaly present.   Cardiovascular: Normal rate, regular rhythm and normal heart sounds.    Pulmonary/Chest: Breath sounds normal. No respiratory distress.   Abdominal: Soft. Exhibits no distension and no mass. There is no hepatosplenomegaly. There is no tenderness. There is no rebound and no guarding.  midline abdominal incision well-healed   Musculoskeletal:Exhibits no edema, tenderness or deformity.   Neurological: Alert and oriented to person, place, and time. Exhibits normal muscle tone.   Skin: No ecchymosis, no petechiae and no rash noted. Not diaphoretic. No cyanosis. Nails show no clubbing.   Psychiatric: Normal mood and affect.   Vitals reviewed.      No radiology results for the last 7 days          ASSESSMENT & PLAN:    1.  Pathological stage IIa gastric cancer status post subtotal gastrectomy 6/29/18    Discussion: we plan on resuming her 5-FU leucovorin oxaliplatin Taxol ×4 more courses.  We will start that next week and I have contacted Dr. Crooks to contact me should there be any reason we should not proceed.  Her wounds look be in great shape and she had an excellent response as can be seen above to the 4 neoadjuvant courses of FLOT.  Discussed with patient 25 minutes greater than 50% spent in counseling.  Kaden Oswald MD    08/02/2018      "

## 2018-08-08 ENCOUNTER — OFFICE VISIT (OUTPATIENT)
Dept: FAMILY MEDICINE CLINIC | Facility: CLINIC | Age: 53
End: 2018-08-08

## 2018-08-08 VITALS
DIASTOLIC BLOOD PRESSURE: 80 MMHG | BODY MASS INDEX: 31.64 KG/M2 | WEIGHT: 173 LBS | SYSTOLIC BLOOD PRESSURE: 120 MMHG | TEMPERATURE: 98.4 F | RESPIRATION RATE: 18 BRPM | HEART RATE: 68 BPM

## 2018-08-08 DIAGNOSIS — C16.9 MALIGNANT NEOPLASM OF STOMACH, UNSPECIFIED LOCATION (HCC): Primary | ICD-10-CM

## 2018-08-08 PROCEDURE — 99213 OFFICE O/P EST LOW 20 MIN: CPT | Performed by: FAMILY MEDICINE

## 2018-08-08 NOTE — PROGRESS NOTES
Subjective   Astrid Nobles is a 53 y.o. female.     History of Present Illness   Found to have gastric cancer, had surgery and chemotherapy.  To begin chemotherapy again nest week.   Strength fairly good.  Concerned about nutrition. No extremity swelling.  No headaches.  On iron replacement and dexamethasone.  The following portions of the patient's history were reviewed and updated as appropriate: allergies, current medications, past family history, past medical history, past social history, past surgical history and problem list.    Review of Systems   Constitutional: Negative for activity change, chills and fever.   Respiratory: Negative.    Cardiovascular: Negative.    Gastrointestinal: Negative for constipation and diarrhea.       Objective   Physical Exam   Constitutional: She appears well-developed.   Pulmonary/Chest: Breath sounds normal.   Has port right upper chest wall.   Abdominal: She exhibits no distension.   Neurological: She is alert.   Vitals reviewed.      Assessment/Plan   Astrid was seen today for follow-up.    Diagnoses and all orders for this visit:    Malignant neoplasm of stomach, unspecified location (CMS/HCC)      Recommend a daily vitamin with folic acid.

## 2018-08-09 ENCOUNTER — INFUSION (OUTPATIENT)
Dept: ONCOLOGY | Facility: HOSPITAL | Age: 53
End: 2018-08-09

## 2018-08-09 VITALS
RESPIRATION RATE: 18 BRPM | WEIGHT: 170 LBS | DIASTOLIC BLOOD PRESSURE: 92 MMHG | SYSTOLIC BLOOD PRESSURE: 120 MMHG | TEMPERATURE: 97.8 F | BODY MASS INDEX: 31.28 KG/M2 | HEART RATE: 54 BPM | HEIGHT: 62 IN

## 2018-08-09 DIAGNOSIS — C16.9 MALIGNANT NEOPLASM OF STOMACH, UNSPECIFIED LOCATION (HCC): Primary | ICD-10-CM

## 2018-08-09 LAB
ALBUMIN SERPL-MCNC: 4.4 G/DL (ref 3.2–4.8)
ALBUMIN/GLOB SERPL: 1.8 G/DL (ref 1.5–2.5)
ALP SERPL-CCNC: 75 U/L (ref 25–100)
ALT SERPL W P-5'-P-CCNC: 16 U/L (ref 7–40)
ANION GAP SERPL CALCULATED.3IONS-SCNC: 10 MMOL/L (ref 3–11)
AST SERPL-CCNC: 17 U/L (ref 0–33)
BILIRUB SERPL-MCNC: 0.3 MG/DL (ref 0.3–1.2)
BUN BLD-MCNC: 13 MG/DL (ref 9–23)
BUN/CREAT SERPL: 17.1 (ref 7–25)
CALCIUM SPEC-SCNC: 9.5 MG/DL (ref 8.7–10.4)
CHLORIDE SERPL-SCNC: 108 MMOL/L (ref 99–109)
CO2 SERPL-SCNC: 26 MMOL/L (ref 20–31)
CREAT BLD-MCNC: 0.76 MG/DL (ref 0.6–1.3)
CREAT BLDA-MCNC: 0.7 MG/DL (ref 0.6–1.3)
ERYTHROCYTE [DISTWIDTH] IN BLOOD BY AUTOMATED COUNT: 23.1 % (ref 11.3–14.5)
GFR SERPL CREATININE-BSD FRML MDRD: 96 ML/MIN/1.73
GLOBULIN UR ELPH-MCNC: 2.5 GM/DL
GLUCOSE BLD-MCNC: 109 MG/DL (ref 70–100)
HCT VFR BLD AUTO: 33.5 % (ref 34.5–44)
HGB BLD-MCNC: 10.4 G/DL (ref 11.5–15.5)
LYMPHOCYTES # BLD AUTO: 1.8 10*3/MM3 (ref 0.6–4.8)
LYMPHOCYTES NFR BLD AUTO: 29.4 % (ref 24–44)
MCH RBC QN AUTO: 23.2 PG (ref 27–31)
MCHC RBC AUTO-ENTMCNC: 31.1 G/DL (ref 32–36)
MCV RBC AUTO: 74.6 FL (ref 80–99)
MONOCYTES # BLD AUTO: 0.4 10*3/MM3 (ref 0–1)
MONOCYTES NFR BLD AUTO: 6.8 % (ref 0–12)
NEUTROPHILS # BLD AUTO: 3.8 10*3/MM3 (ref 1.5–8.3)
NEUTROPHILS NFR BLD AUTO: 63.8 % (ref 41–71)
PLATELET # BLD AUTO: 211 10*3/MM3 (ref 150–450)
PMV BLD AUTO: 9 FL (ref 6–12)
POTASSIUM BLD-SCNC: 3.6 MMOL/L (ref 3.5–5.5)
PROT SERPL-MCNC: 6.9 G/DL (ref 5.7–8.2)
RBC # BLD AUTO: 4.49 10*6/MM3 (ref 3.89–5.14)
SODIUM BLD-SCNC: 144 MMOL/L (ref 132–146)
WBC NRBC COR # BLD: 6 10*3/MM3 (ref 3.5–10.8)

## 2018-08-09 PROCEDURE — 25010000002 DIPHENHYDRAMINE PER 50 MG: Performed by: NURSE PRACTITIONER

## 2018-08-09 PROCEDURE — 96415 CHEMO IV INFUSION ADDL HR: CPT

## 2018-08-09 PROCEDURE — 25010000002 DEXAMETHASONE PER 1 MG: Performed by: INTERNAL MEDICINE

## 2018-08-09 PROCEDURE — 25010000002 DOCETAXEL 20 MG/ML CONCENTRATION 4 ML VIAL: Performed by: INTERNAL MEDICINE

## 2018-08-09 PROCEDURE — 25010000002 OXALIPLATIN PER 0.5 MG: Performed by: INTERNAL MEDICINE

## 2018-08-09 PROCEDURE — 96375 TX/PRO/DX INJ NEW DRUG ADDON: CPT

## 2018-08-09 PROCEDURE — 96521 REFILL/MAINT PORTABLE PUMP: CPT

## 2018-08-09 PROCEDURE — 25010000002 DOCETAXEL 20 MG/ML CONCENTRATION 1 ML VIAL: Performed by: INTERNAL MEDICINE

## 2018-08-09 PROCEDURE — 96368 THER/DIAG CONCURRENT INF: CPT

## 2018-08-09 PROCEDURE — 25010000002 FLUOROURACIL PER 500 MG: Performed by: INTERNAL MEDICINE

## 2018-08-09 PROCEDURE — 25010000002 PALONOSETRON PER 25 MCG: Performed by: INTERNAL MEDICINE

## 2018-08-09 PROCEDURE — 96416 CHEMO PROLONG INFUSE W/PUMP: CPT

## 2018-08-09 PROCEDURE — 82565 ASSAY OF CREATININE: CPT

## 2018-08-09 PROCEDURE — 85025 COMPLETE CBC W/AUTO DIFF WBC: CPT

## 2018-08-09 PROCEDURE — 96417 CHEMO IV INFUS EACH ADDL SEQ: CPT

## 2018-08-09 PROCEDURE — 80053 COMPREHEN METABOLIC PANEL: CPT

## 2018-08-09 PROCEDURE — 25010000002 LEUCOVORIN 200 MG RECONSTITUTED SOLUTION 1 EACH VIAL: Performed by: INTERNAL MEDICINE

## 2018-08-09 PROCEDURE — 96413 CHEMO IV INFUSION 1 HR: CPT

## 2018-08-09 RX ORDER — PALONOSETRON 0.05 MG/ML
0.25 INJECTION, SOLUTION INTRAVENOUS ONCE
Status: COMPLETED | OUTPATIENT
Start: 2018-08-09 | End: 2018-08-09

## 2018-08-09 RX ORDER — SODIUM CHLORIDE 0.9 % (FLUSH) 0.9 %
10 SYRINGE (ML) INJECTION AS NEEDED
Status: ACTIVE | OUTPATIENT
Start: 2018-08-09

## 2018-08-09 RX ORDER — DEXTROSE MONOHYDRATE 50 MG/ML
250 INJECTION, SOLUTION INTRAVENOUS ONCE
Status: COMPLETED | OUTPATIENT
Start: 2018-08-09 | End: 2018-08-09

## 2018-08-09 RX ORDER — SODIUM CHLORIDE 0.9 % (FLUSH) 0.9 %
10 SYRINGE (ML) INJECTION AS NEEDED
Status: CANCELLED | OUTPATIENT
Start: 2018-08-09

## 2018-08-09 RX ORDER — SODIUM CHLORIDE 9 MG/ML
100 INJECTION, SOLUTION INTRAVENOUS CONTINUOUS
Status: ACTIVE | OUTPATIENT
Start: 2018-08-09

## 2018-08-09 RX ORDER — DIPHENHYDRAMINE HYDROCHLORIDE 50 MG/ML
25 INJECTION INTRAMUSCULAR; INTRAVENOUS ONCE
Status: COMPLETED | OUTPATIENT
Start: 2018-08-09 | End: 2018-08-09

## 2018-08-09 RX ORDER — FAMOTIDINE 10 MG/ML
20 INJECTION, SOLUTION INTRAVENOUS ONCE
Status: COMPLETED | OUTPATIENT
Start: 2018-08-09 | End: 2018-08-09

## 2018-08-09 RX ADMIN — LEUCOVORIN CALCIUM 370 MG: 200 INJECTION, POWDER, LYOPHILIZED, FOR SOLUTION INTRAMUSCULAR; INTRAVENOUS at 12:40

## 2018-08-09 RX ADMIN — FAMOTIDINE 20 MG: 10 INJECTION, SOLUTION INTRAVENOUS at 11:38

## 2018-08-09 RX ADMIN — SODIUM CHLORIDE 95 MG: 9 INJECTION, SOLUTION INTRAVENOUS at 10:38

## 2018-08-09 RX ADMIN — DEXAMETHASONE SODIUM PHOSPHATE 12 MG: 4 INJECTION, SOLUTION INTRAMUSCULAR; INTRAVENOUS at 10:05

## 2018-08-09 RX ADMIN — PALONOSETRON HYDROCHLORIDE 0.25 MG: 0.25 INJECTION INTRAVENOUS at 10:01

## 2018-08-09 RX ADMIN — OXALIPLATIN 150 MG: 100 INJECTION, SOLUTION, CONCENTRATE INTRAVENOUS at 12:38

## 2018-08-09 RX ADMIN — FLUOROURACIL 4810 MG: 50 INJECTION, SOLUTION INTRAVENOUS at 14:52

## 2018-08-09 RX ADMIN — DIPHENHYDRAMINE HYDROCHLORIDE 25 MG: 50 INJECTION INTRAMUSCULAR; INTRAVENOUS at 11:35

## 2018-08-09 RX ADMIN — DEXTROSE MONOHYDRATE 250 ML: 50 INJECTION, SOLUTION INTRAVENOUS at 10:38

## 2018-08-09 RX ADMIN — SODIUM CHLORIDE 100 ML/HR: 9 INJECTION, SOLUTION INTRAVENOUS at 11:25

## 2018-08-10 ENCOUNTER — INFUSION (OUTPATIENT)
Dept: ONCOLOGY | Facility: HOSPITAL | Age: 53
End: 2018-08-10

## 2018-08-10 ENCOUNTER — TELEPHONE (OUTPATIENT)
Dept: ONCOLOGY | Facility: CLINIC | Age: 53
End: 2018-08-10

## 2018-08-10 VITALS
SYSTOLIC BLOOD PRESSURE: 116 MMHG | RESPIRATION RATE: 18 BRPM | HEIGHT: 62 IN | BODY MASS INDEX: 31.28 KG/M2 | HEART RATE: 77 BPM | TEMPERATURE: 97.8 F | WEIGHT: 170 LBS | DIASTOLIC BLOOD PRESSURE: 75 MMHG

## 2018-08-10 DIAGNOSIS — C16.9 MALIGNANT NEOPLASM OF STOMACH, UNSPECIFIED LOCATION (HCC): Primary | ICD-10-CM

## 2018-08-10 PROCEDURE — 25010000002 HEPARIN FLUSH (PORCINE) 100 UNIT/ML SOLUTION: Performed by: INTERNAL MEDICINE

## 2018-08-10 PROCEDURE — 96523 IRRIG DRUG DELIVERY DEVICE: CPT

## 2018-08-10 RX ORDER — PROCHLORPERAZINE MALEATE 10 MG
10 TABLET ORAL EVERY 6 HOURS PRN
Qty: 60 TABLET | Refills: 5 | Status: SHIPPED | OUTPATIENT
Start: 2018-08-10 | End: 2018-10-11

## 2018-08-10 RX ORDER — SODIUM CHLORIDE 0.9 % (FLUSH) 0.9 %
10 SYRINGE (ML) INJECTION AS NEEDED
Status: CANCELLED | OUTPATIENT
Start: 2018-08-10

## 2018-08-10 RX ADMIN — HEPARIN 500 UNITS: 100 SYRINGE at 15:14

## 2018-08-10 NOTE — TELEPHONE ENCOUNTER
----- Message from Reema Laws RN sent at 8/9/2018  4:15 PM EDT -----  Regarding: BARB[facility]A/ Infusion event   Just for next infusion FYLOWELL--Astrid had severe back pain midway through Taxotere reaction, had po dex and IV dex.  Stopped, gave Benadryl, pepcid. Restarted at half rate and completed w/o further event.    javier

## 2018-08-16 ENCOUNTER — TELEPHONE (OUTPATIENT)
Dept: ONCOLOGY | Facility: CLINIC | Age: 53
End: 2018-08-16

## 2018-08-16 NOTE — TELEPHONE ENCOUNTER
----- Message from Bonnie Joiner sent at 2018  3:21 PM EDT -----  Regarding: FAMILIA-HANDICAP STICKER  Contact: [phone]  Patient called her handicap sticker is going to  on 18 and she needs to get this extended? Please call.

## 2018-08-16 NOTE — TELEPHONE ENCOUNTER
New form signed.  Called patient to see if she wants us to mail to her of if she would like to .  She will  tomorrow.

## 2018-08-21 DIAGNOSIS — C16.9 MALIGNANT NEOPLASM OF STOMACH, UNSPECIFIED LOCATION (HCC): ICD-10-CM

## 2018-08-21 RX ORDER — FAMOTIDINE 10 MG/ML
20 INJECTION, SOLUTION INTRAVENOUS ONCE
Status: CANCELLED
Start: 2018-08-23 | End: 2018-08-23

## 2018-08-21 RX ORDER — DIPHENHYDRAMINE HYDROCHLORIDE 50 MG/ML
25 INJECTION INTRAMUSCULAR; INTRAVENOUS ONCE
Status: CANCELLED
Start: 2018-09-13 | End: 2018-09-05

## 2018-08-21 RX ORDER — DEXTROSE MONOHYDRATE 50 MG/ML
250 INJECTION, SOLUTION INTRAVENOUS ONCE
Status: CANCELLED | OUTPATIENT
Start: 2018-09-13

## 2018-08-21 RX ORDER — DEXTROSE MONOHYDRATE 50 MG/ML
250 INJECTION, SOLUTION INTRAVENOUS ONCE
Status: CANCELLED | OUTPATIENT
Start: 2018-08-23

## 2018-08-21 RX ORDER — PALONOSETRON 0.05 MG/ML
0.25 INJECTION, SOLUTION INTRAVENOUS ONCE
Status: CANCELLED | OUTPATIENT
Start: 2018-09-13

## 2018-08-21 RX ORDER — DIPHENHYDRAMINE HYDROCHLORIDE 50 MG/ML
25 INJECTION INTRAMUSCULAR; INTRAVENOUS ONCE
Status: CANCELLED
Start: 2018-08-23 | End: 2018-08-23

## 2018-08-21 RX ORDER — FAMOTIDINE 10 MG/ML
20 INJECTION, SOLUTION INTRAVENOUS ONCE
Status: CANCELLED
Start: 2018-09-13 | End: 2018-09-05

## 2018-08-21 RX ORDER — PALONOSETRON 0.05 MG/ML
0.25 INJECTION, SOLUTION INTRAVENOUS ONCE
Status: CANCELLED | OUTPATIENT
Start: 2018-08-23

## 2018-08-23 ENCOUNTER — INFUSION (OUTPATIENT)
Dept: ONCOLOGY | Facility: HOSPITAL | Age: 53
End: 2018-08-23

## 2018-08-23 ENCOUNTER — OFFICE VISIT (OUTPATIENT)
Dept: ONCOLOGY | Facility: CLINIC | Age: 53
End: 2018-08-23

## 2018-08-23 VITALS
WEIGHT: 169 LBS | SYSTOLIC BLOOD PRESSURE: 148 MMHG | BODY MASS INDEX: 31.1 KG/M2 | DIASTOLIC BLOOD PRESSURE: 83 MMHG | RESPIRATION RATE: 16 BRPM | HEART RATE: 67 BPM | HEIGHT: 62 IN | TEMPERATURE: 98.1 F

## 2018-08-23 DIAGNOSIS — C16.9 MALIGNANT NEOPLASM OF STOMACH, UNSPECIFIED LOCATION (HCC): Primary | ICD-10-CM

## 2018-08-23 LAB
ALBUMIN SERPL-MCNC: 4.21 G/DL (ref 3.2–4.8)
ALBUMIN/GLOB SERPL: 1.8 G/DL (ref 1.5–2.5)
ALP SERPL-CCNC: 76 U/L (ref 25–100)
ALT SERPL W P-5'-P-CCNC: 14 U/L (ref 7–40)
ANION GAP SERPL CALCULATED.3IONS-SCNC: 9 MMOL/L (ref 3–11)
AST SERPL-CCNC: 18 U/L (ref 0–33)
BILIRUB SERPL-MCNC: 0.3 MG/DL (ref 0.3–1.2)
BUN BLD-MCNC: 13 MG/DL (ref 9–23)
BUN/CREAT SERPL: 17.8 (ref 7–25)
CALCIUM SPEC-SCNC: 8.7 MG/DL (ref 8.7–10.4)
CHLORIDE SERPL-SCNC: 108 MMOL/L (ref 99–109)
CO2 SERPL-SCNC: 26 MMOL/L (ref 20–31)
CREAT BLD-MCNC: 0.73 MG/DL (ref 0.6–1.3)
CREAT BLDA-MCNC: 0.7 MG/DL (ref 0.6–1.3)
ERYTHROCYTE [DISTWIDTH] IN BLOOD BY AUTOMATED COUNT: 20.5 % (ref 11.3–14.5)
GFR SERPL CREATININE-BSD FRML MDRD: 101 ML/MIN/1.73
GLOBULIN UR ELPH-MCNC: 2.4 GM/DL
GLUCOSE BLD-MCNC: 110 MG/DL (ref 70–100)
HCT VFR BLD AUTO: 32.1 % (ref 34.5–44)
HGB BLD-MCNC: 10 G/DL (ref 11.5–15.5)
LYMPHOCYTES # BLD AUTO: 1.2 10*3/MM3 (ref 0.6–4.8)
LYMPHOCYTES NFR BLD AUTO: 49 % (ref 24–44)
MCH RBC QN AUTO: 23.1 PG (ref 27–31)
MCHC RBC AUTO-ENTMCNC: 31.2 G/DL (ref 32–36)
MCV RBC AUTO: 74.1 FL (ref 80–99)
MONOCYTES # BLD AUTO: 0.2 10*3/MM3 (ref 0–1)
MONOCYTES NFR BLD AUTO: 8.9 % (ref 0–12)
NEUTROPHILS # BLD AUTO: 1 10*3/MM3 (ref 1.5–8.3)
NEUTROPHILS NFR BLD AUTO: 42.1 % (ref 41–71)
PLATELET # BLD AUTO: 237 10*3/MM3 (ref 150–450)
PMV BLD AUTO: 8.6 FL (ref 6–12)
POTASSIUM BLD-SCNC: 3.4 MMOL/L (ref 3.5–5.5)
PROT SERPL-MCNC: 6.6 G/DL (ref 5.7–8.2)
RBC # BLD AUTO: 4.33 10*6/MM3 (ref 3.89–5.14)
SODIUM BLD-SCNC: 143 MMOL/L (ref 132–146)
WBC NRBC COR # BLD: 2.4 10*3/MM3 (ref 3.5–10.8)

## 2018-08-23 PROCEDURE — 96375 TX/PRO/DX INJ NEW DRUG ADDON: CPT

## 2018-08-23 PROCEDURE — 96416 CHEMO PROLONG INFUSE W/PUMP: CPT

## 2018-08-23 PROCEDURE — 25010000002 DOCETAXEL 20 MG/ML CONCENTRATION 4 ML VIAL: Performed by: INTERNAL MEDICINE

## 2018-08-23 PROCEDURE — 96413 CHEMO IV INFUSION 1 HR: CPT

## 2018-08-23 PROCEDURE — 96415 CHEMO IV INFUSION ADDL HR: CPT

## 2018-08-23 PROCEDURE — 80053 COMPREHEN METABOLIC PANEL: CPT

## 2018-08-23 PROCEDURE — 25010000002 DEXAMETHASONE PER 1 MG: Performed by: INTERNAL MEDICINE

## 2018-08-23 PROCEDURE — 25010000002 PALONOSETRON PER 25 MCG: Performed by: INTERNAL MEDICINE

## 2018-08-23 PROCEDURE — 25010000002 DIPHENHYDRAMINE PER 50 MG: Performed by: INTERNAL MEDICINE

## 2018-08-23 PROCEDURE — 25010000002 FLUOROURACIL PER 500 MG: Performed by: INTERNAL MEDICINE

## 2018-08-23 PROCEDURE — 85025 COMPLETE CBC W/AUTO DIFF WBC: CPT

## 2018-08-23 PROCEDURE — 96417 CHEMO IV INFUS EACH ADDL SEQ: CPT

## 2018-08-23 PROCEDURE — 25010000002 DOCETAXEL 20 MG/ML CONCENTRATION 1 ML VIAL: Performed by: INTERNAL MEDICINE

## 2018-08-23 PROCEDURE — 25010000002 LEUCOVORIN 200 MG RECONSTITUTED SOLUTION 1 EACH VIAL: Performed by: INTERNAL MEDICINE

## 2018-08-23 PROCEDURE — 82565 ASSAY OF CREATININE: CPT

## 2018-08-23 PROCEDURE — 96368 THER/DIAG CONCURRENT INF: CPT

## 2018-08-23 PROCEDURE — 25010000002 OXALIPLATIN PER 0.5 MG: Performed by: INTERNAL MEDICINE

## 2018-08-23 PROCEDURE — 99215 OFFICE O/P EST HI 40 MIN: CPT | Performed by: INTERNAL MEDICINE

## 2018-08-23 RX ORDER — DIPHENHYDRAMINE HYDROCHLORIDE 50 MG/ML
25 INJECTION INTRAMUSCULAR; INTRAVENOUS ONCE
Status: COMPLETED | OUTPATIENT
Start: 2018-08-23 | End: 2018-08-23

## 2018-08-23 RX ORDER — PALONOSETRON 0.05 MG/ML
0.25 INJECTION, SOLUTION INTRAVENOUS ONCE
Status: COMPLETED | OUTPATIENT
Start: 2018-08-23 | End: 2018-08-23

## 2018-08-23 RX ORDER — DEXAMETHASONE 4 MG/1
TABLET ORAL
Qty: 60 TABLET | Refills: 2 | Status: SHIPPED | OUTPATIENT
Start: 2018-08-23 | End: 2018-10-11

## 2018-08-23 RX ORDER — FAMOTIDINE 10 MG/ML
20 INJECTION, SOLUTION INTRAVENOUS ONCE
Status: COMPLETED | OUTPATIENT
Start: 2018-08-23 | End: 2018-08-23

## 2018-08-23 RX ADMIN — FAMOTIDINE 20 MG: 10 INJECTION, SOLUTION INTRAVENOUS at 10:50

## 2018-08-23 RX ADMIN — OXALIPLATIN 150 MG: 100 INJECTION, SOLUTION, CONCENTRATE INTRAVENOUS at 12:34

## 2018-08-23 RX ADMIN — DEXAMETHASONE SODIUM PHOSPHATE 12 MG: 4 INJECTION, SOLUTION INTRAMUSCULAR; INTRAVENOUS at 10:50

## 2018-08-23 RX ADMIN — DOCETAXEL 95 MG: 20 INJECTION, SOLUTION, CONCENTRATE INTRAVENOUS at 11:17

## 2018-08-23 RX ADMIN — DIPHENHYDRAMINE HYDROCHLORIDE 25 MG: 50 INJECTION INTRAMUSCULAR; INTRAVENOUS at 10:50

## 2018-08-23 RX ADMIN — LEUCOVORIN CALCIUM 370 MG: 200 INJECTION, POWDER, LYOPHILIZED, FOR SOLUTION INTRAMUSCULAR; INTRAVENOUS at 12:35

## 2018-08-23 RX ADMIN — FLUOROURACIL 4810 MG: 50 INJECTION, SOLUTION INTRAVENOUS at 14:36

## 2018-08-23 RX ADMIN — PALONOSETRON HYDROCHLORIDE 0.25 MG: 0.25 INJECTION INTRAVENOUS at 10:46

## 2018-08-23 NOTE — PROGRESS NOTES
CHIEF COMPLAINT: Adjuvant therapy gastric carcinoma    Problem List:     Gastric cancer (CMS/[facility])    2/6/2018 Initial Diagnosis     Gastric cancer.  53-year-old female with a three-month history of indigestion and upper abdominal pain.  She was started on omeprazole for presumed reflux, symptoms were not relieved.  She underwent an EGD on 2/6/2018 with Dr. Matt that revealed a large, friable and ulcerated mass in the body of the stomach.  Biopsies returned moderately to poorly differentiated adenocarcinoma.  Staging studies including CT scan of the chest abdomen and pelvis were negative for distant disease.  She underwent diagnostic laparoscopic with peritoneal washings to complete staging on 3/13/2018.  Baseline CBC 3/9/2018 WBC 4820, hemoglobin 8.5, hematocrit 28.9%, platelet count 351,000, MCV 64.8, MCH 19.1, MCHC 29.4.  CEA normal at less than 0.50.  HER-2/erik testing from gastric body biopsy was negative.  CMP was normal other than for serum calcium slightly decreased at 8.6.           2/6/2018 Procedure     EGD revealed gastric ulcerated mass in body of stomach.  Pathology returned:   Final Diagnosis    1. GASTRIC BODY BIOPSY:  Invasive moderate to poorly differentiated adenocarcinoma with ulceration (see comment).   2. GASTRIC ANTRUM BIOPSY:  Minimal chronic gastritis without activity.  No intestinal metaplasia or dysplasia identified.  No Helicobacter pylori-like organisms identified on routine stains.   HER2/erik testing negative  FLUORESCENCE IN-SITU HYBRIDIZATION (FISH) REPORT:  Negative/Not Amplified  HER2/WILL-17 Ratio: 1.3           2/9/2018 Imaging     CT abdomen/pelvis IMPRESSION:     There is mild thickening and prominence of the mid gastric wall of the  body of the stomach, middle third.     Extragastric mass is not identified. The lesser sac and retrogastric  spaces are clear.     Visceral tumor, adenopathy, stranding or caking is not currently  identified as described. There is no  "retroperitoneal or retrogastric  adenopathy. The lower lung zones are clear with no evidence of reactive  pleural effusion or occult mass. There is no liver metastasis and no  other acute focal CT abnormality is identified within the abdomen or  pelvis.     Incidental note is made of a nonobstructing stone right kidney right  lower pole.         3/9/2018 Imaging     CT Chest IMPRESSION:  No acute intrathoracic abnormality. No definite evidence of  metastatic disease.         3/13/2018 Procedure     Diagnostic laproscopy with biopsy and lysis of adhesions.  Pathology returned:  Final Diagnosis   PERITONEAL NODULE, EXCISIONAL BIOPSY:  Benign fibrotic nodule. (See comment)  JFJ/klb    Electronically signed by Boaz Jerez MD on 3/15/2018 at 1624   Comment See result below    The biopsy shows a lobulated fibrotic nodule with some intermixed lymphoid cells. The nodule does not appear neoplastic. One cannot entirely exclude a \"burned out\" lymph node, or other implant. There is no evidence of carcinoma.      Abdominal wall washings benign:  Final Diagnosis    1. ABDOMINAL WASH, WALL:  Negative for malignancy.  Scant cellularity; chronic inflammatory cells and scattered benign mesothelial cells.   2. ABDOMINAL WASH, WALL:  Negative for malignancy.  Scant cellularity; chronic inflammatory cells and scattered benign mesothelial cells.   3. ABDOMINAL WASH, WALL:  Negative for malignancy.  Benign mesothelial cells and mixed inflammatory cells.                4/5/2018 -  Chemotherapy     OP GASTRIC FLOT OXALIplatin / Leucovorin / Fluorouracil/Taxotere           5/9/2018 Genetic Testing     Genetic testing negative for cancer next panel         5/17/2018 Imaging     CT chest abdomen and pelvis showed Shrinkage of the gastric tumor with no evidence of metastasis         6/29/2018 Surgery     Surgery       Procedure:  Subtotal gastrectomy showing 0.7 cm 2 out of 10 node positive moderately differentiated adenocarcinoma of the " stomach.  YpT2 N1 M0 stage IIa            HISTORY OF PRESENT ILLNESS:  The patient is a 53 y.o. female, here for follow up on management of adjuvant therapy gastric carcinoma.  Due for course #6 of 8 of FLOT.  Some nausea but no vomiting.  The nausea has been more nagging and persistent but she did not take her Zofran and only has been trying the Compazine.  Has steroid premedications for the taxane.  Some neuropathy but not worsening.      Past Medical History:   Diagnosis Date   • Abdominal pain    • Acid reflux    • Gastric cancer (CMS/HCC)    • Gastric mass    • Gastric ulcer    • History of transfusion     no reaction 2018   • Wears glasses      Past Surgical History:   Procedure Laterality Date   • CERVICAL BIOPSY  W/ LOOP ELECTRODE EXCISION     • DIAGNOSTIC LAPAROSCOPY N/A 3/13/2018    Procedure: DIAGNOSTIC LAPAROSCOPY WITH BIOPSY, LYSIS OF ADHESIONS;  Surgeon: Nicole Crooks MD;  Location:  [facility];  Service: General   • ENDOSCOPY     • GASTRECTOMY N/A 6/29/2018    Procedure: SUBTOTAL GASTRECTOMY;  Surgeon: Nicole Crooks MD;  Location:  [facility];  Service: General   • TUBAL ABDOMINAL LIGATION     • VENOUS ACCESS DEVICE (PORT) INSERTION         No Known Allergies    Family History and Social History reviewed and changed as necessary      REVIEW OF SYSTEM:   Review of Systems   Constitutional: Negative for appetite change, chills, diaphoresis, fatigue, fever and unexpected weight change.   HENT:   Negative for mouth sores, sore throat and trouble swallowing.    Eyes: Negative for icterus.   Respiratory: Negative for cough, hemoptysis and shortness of breath.    Cardiovascular: Negative for chest pain, leg swelling and palpitations.   Gastrointestinal: Negative for abdominal distention, abdominal pain, blood in stool, constipation, diarrhea, nausea and vomiting.   Endocrine: Negative for hot flashes.   Genitourinary: Negative for bladder incontinence, difficulty urinating, dysuria, frequency and hematuria.   "  Musculoskeletal: Negative for gait problem, neck pain and neck stiffness.   Skin: Negative for rash.   Neurological: Negative for dizziness, gait problem, headaches, light-headedness and numbness.   Hematological: Negative for adenopathy. Does not bruise/bleed easily.   Psychiatric/Behavioral: Negative for depression. The patient is not nervous/anxious.    All other systems reviewed and are negative.       PHYSICAL EXAM    Vitals:    08/23/18 0851   BP: 148/83   Pulse: 67   Resp: 16   Temp: 98.1 °F (36.7 °C)   Weight: 76.7 kg (169 lb)   Height: 157.5 cm (62\")     Constitutional: Appears well-developed and well-nourished. No distress.   ECOG: (1) Restricted in physically strenuous activity, ambulatory and able to do work of light nature  HENT:   Head: Normocephalic.   Mouth/Throat: Oropharynx is clear and moist.   Eyes: Conjunctivae are normal. Pupils are equal, round, and reactive to light. No scleral icterus.   Neck: Neck supple. No JVD present. No thyromegaly present.   Cardiovascular: Normal rate, regular rhythm and normal heart sounds.    Pulmonary/Chest: Breath sounds normal. No respiratory distress.   Abdominal: Soft. Exhibits no distension and no mass. There is no hepatosplenomegaly. There is no tenderness. There is no rebound and no guarding.   Musculoskeletal:Exhibits no edema, tenderness or deformity.   Neurological: Alert and oriented to person, place, and time. Exhibits normal muscle tone.   Skin: No ecchymosis, no petechiae and no rash noted. Not diaphoretic. No cyanosis. Nails show no clubbing.   Psychiatric: Normal mood and affect.   Vitals reviewed.      No radiology results for the last 7 days          ASSESSMENT & PLAN:    1.  Gastric carcinoma  2. Nausea    Discussion: we will continue to watch for worsening of the neuropathy but we'll press on with course #6 of a planned 8 courses and see her back September 5 for course #7.  Watch her counts as well of course.  Watching her renal function etc.  " Knows to watch for cold weather in the face of oxaliplatin as the weather is changing.  For the nausea we will add dexamethasone every 12 hours on an ongoing basis along with the Compazine and she will take the Zofran as she was not using the Zofran with her nausea.  If all of that is ineffective then we will add olanzapine.  Discussed with patient 45 minutes greater than 50% spent in counseling      Kaden Oswald MD    08/23/2018

## 2018-08-24 ENCOUNTER — INFUSION (OUTPATIENT)
Dept: ONCOLOGY | Facility: HOSPITAL | Age: 53
End: 2018-08-24

## 2018-08-24 VITALS
HEIGHT: 62 IN | WEIGHT: 169 LBS | SYSTOLIC BLOOD PRESSURE: 126 MMHG | TEMPERATURE: 97.7 F | RESPIRATION RATE: 16 BRPM | DIASTOLIC BLOOD PRESSURE: 78 MMHG | BODY MASS INDEX: 31.1 KG/M2 | HEART RATE: 66 BPM

## 2018-08-24 DIAGNOSIS — C16.9 MALIGNANT NEOPLASM OF STOMACH, UNSPECIFIED LOCATION (HCC): Primary | ICD-10-CM

## 2018-08-24 PROCEDURE — 25010000002 HEPARIN FLUSH (PORCINE) 100 UNIT/ML SOLUTION: Performed by: INTERNAL MEDICINE

## 2018-08-24 PROCEDURE — 96523 IRRIG DRUG DELIVERY DEVICE: CPT

## 2018-08-24 RX ORDER — SODIUM CHLORIDE 0.9 % (FLUSH) 0.9 %
10 SYRINGE (ML) INJECTION AS NEEDED
Status: CANCELLED | OUTPATIENT
Start: 2018-08-24

## 2018-08-24 RX ADMIN — HEPARIN 500 UNITS: 100 SYRINGE at 14:59

## 2018-09-06 ENCOUNTER — INFUSION (OUTPATIENT)
Dept: ONCOLOGY | Facility: HOSPITAL | Age: 53
End: 2018-09-06

## 2018-09-06 ENCOUNTER — OFFICE VISIT (OUTPATIENT)
Dept: ONCOLOGY | Facility: CLINIC | Age: 53
End: 2018-09-06

## 2018-09-06 VITALS
HEART RATE: 78 BPM | DIASTOLIC BLOOD PRESSURE: 70 MMHG | BODY MASS INDEX: 30.91 KG/M2 | OXYGEN SATURATION: 98 % | SYSTOLIC BLOOD PRESSURE: 135 MMHG | WEIGHT: 168 LBS | RESPIRATION RATE: 16 BRPM | TEMPERATURE: 98 F | HEIGHT: 62 IN

## 2018-09-06 DIAGNOSIS — C16.9 MALIGNANT NEOPLASM OF STOMACH, UNSPECIFIED LOCATION (HCC): Primary | ICD-10-CM

## 2018-09-06 LAB
ALBUMIN SERPL-MCNC: 4.05 G/DL (ref 3.2–4.8)
ALBUMIN/GLOB SERPL: 1.9 G/DL (ref 1.5–2.5)
ALP SERPL-CCNC: 67 U/L (ref 25–100)
ALT SERPL W P-5'-P-CCNC: 15 U/L (ref 7–40)
ANION GAP SERPL CALCULATED.3IONS-SCNC: 3 MMOL/L (ref 3–11)
AST SERPL-CCNC: 19 U/L (ref 0–33)
BILIRUB SERPL-MCNC: 0.4 MG/DL (ref 0.3–1.2)
BUN BLD-MCNC: 12 MG/DL (ref 9–23)
BUN/CREAT SERPL: 16.7 (ref 7–25)
CALCIUM SPEC-SCNC: 8.8 MG/DL (ref 8.7–10.4)
CHLORIDE SERPL-SCNC: 108 MMOL/L (ref 99–109)
CO2 SERPL-SCNC: 29 MMOL/L (ref 20–31)
CREAT BLD-MCNC: 0.72 MG/DL (ref 0.6–1.3)
CREAT BLDA-MCNC: 0.7 MG/DL (ref 0.6–1.3)
ERYTHROCYTE [DISTWIDTH] IN BLOOD BY AUTOMATED COUNT: 18.5 % (ref 11.3–14.5)
GFR SERPL CREATININE-BSD FRML MDRD: 103 ML/MIN/1.73
GLOBULIN UR ELPH-MCNC: 2.2 GM/DL
GLUCOSE BLD-MCNC: 106 MG/DL (ref 70–100)
HCT VFR BLD AUTO: 32 % (ref 34.5–44)
HGB BLD-MCNC: 10.2 G/DL (ref 11.5–15.5)
LYMPHOCYTES # BLD AUTO: 1.4 10*3/MM3 (ref 0.6–4.8)
LYMPHOCYTES NFR BLD AUTO: 61.5 % (ref 24–44)
MCH RBC QN AUTO: 24.2 PG (ref 27–31)
MCHC RBC AUTO-ENTMCNC: 32.1 G/DL (ref 32–36)
MCV RBC AUTO: 75.5 FL (ref 80–99)
MONOCYTES # BLD AUTO: 0.4 10*3/MM3 (ref 0–1)
MONOCYTES NFR BLD AUTO: 16.7 % (ref 0–12)
NEUTROPHILS # BLD AUTO: 0.5 10*3/MM3 (ref 1.5–8.3)
NEUTROPHILS NFR BLD AUTO: 21.8 % (ref 41–71)
PLATELET # BLD AUTO: 212 10*3/MM3 (ref 150–450)
PMV BLD AUTO: 8 FL (ref 6–12)
POTASSIUM BLD-SCNC: 3.2 MMOL/L (ref 3.5–5.5)
PROT SERPL-MCNC: 6.2 G/DL (ref 5.7–8.2)
RBC # BLD AUTO: 4.23 10*6/MM3 (ref 3.89–5.14)
SODIUM BLD-SCNC: 140 MMOL/L (ref 132–146)
WBC NRBC COR # BLD: 2.2 10*3/MM3 (ref 3.5–10.8)

## 2018-09-06 PROCEDURE — 99213 OFFICE O/P EST LOW 20 MIN: CPT | Performed by: NURSE PRACTITIONER

## 2018-09-06 PROCEDURE — 82565 ASSAY OF CREATININE: CPT

## 2018-09-06 PROCEDURE — 25010000002 HEPARIN FLUSH (PORCINE) 100 UNIT/ML SOLUTION: Performed by: INTERNAL MEDICINE

## 2018-09-06 PROCEDURE — 36591 DRAW BLOOD OFF VENOUS DEVICE: CPT

## 2018-09-06 PROCEDURE — 85025 COMPLETE CBC W/AUTO DIFF WBC: CPT

## 2018-09-06 PROCEDURE — 80053 COMPREHEN METABOLIC PANEL: CPT

## 2018-09-06 RX ORDER — SODIUM CHLORIDE 0.9 % (FLUSH) 0.9 %
10 SYRINGE (ML) INJECTION AS NEEDED
Status: CANCELLED | OUTPATIENT
Start: 2018-09-06

## 2018-09-06 RX ADMIN — HEPARIN 500 UNITS: 100 SYRINGE at 10:04

## 2018-09-06 NOTE — PROGRESS NOTES
CHIEF COMPLAINT: Adjuvant therapy gastric carcinoma    Problem List:     Gastric cancer (CMS/[facility])    2/6/2018 Initial Diagnosis     Gastric cancer.  53-year-old female with a three-month history of indigestion and upper abdominal pain.  She was started on omeprazole for presumed reflux, symptoms were not relieved.  She underwent an EGD on 2/6/2018 with Dr. Matt that revealed a large, friable and ulcerated mass in the body of the stomach.  Biopsies returned moderately to poorly differentiated adenocarcinoma.  Staging studies including CT scan of the chest abdomen and pelvis were negative for distant disease.  She underwent diagnostic laparoscopic with peritoneal washings to complete staging on 3/13/2018.  Baseline CBC 3/9/2018 WBC 4820, hemoglobin 8.5, hematocrit 28.9%, platelet count 351,000, MCV 64.8, MCH 19.1, MCHC 29.4.  CEA normal at less than 0.50.  HER-2/erik testing from gastric body biopsy was negative.  CMP was normal other than for serum calcium slightly decreased at 8.6.           2/6/2018 Procedure     EGD revealed gastric ulcerated mass in body of stomach.  Pathology returned:   Final Diagnosis    1. GASTRIC BODY BIOPSY:  Invasive moderate to poorly differentiated adenocarcinoma with ulceration (see comment).   2. GASTRIC ANTRUM BIOPSY:  Minimal chronic gastritis without activity.  No intestinal metaplasia or dysplasia identified.  No Helicobacter pylori-like organisms identified on routine stains.   HER2/erik testing negative  FLUORESCENCE IN-SITU HYBRIDIZATION (FISH) REPORT:  Negative/Not Amplified  HER2/WILL-17 Ratio: 1.3           2/9/2018 Imaging     CT abdomen/pelvis IMPRESSION:     There is mild thickening and prominence of the mid gastric wall of the  body of the stomach, middle third.     Extragastric mass is not identified. The lesser sac and retrogastric  spaces are clear.     Visceral tumor, adenopathy, stranding or caking is not currently  identified as described. There is no  "retroperitoneal or retrogastric  adenopathy. The lower lung zones are clear with no evidence of reactive  pleural effusion or occult mass. There is no liver metastasis and no  other acute focal CT abnormality is identified within the abdomen or  pelvis.     Incidental note is made of a nonobstructing stone right kidney right  lower pole.         3/9/2018 Imaging     CT Chest IMPRESSION:  No acute intrathoracic abnormality. No definite evidence of  metastatic disease.         3/13/2018 Procedure     Diagnostic laproscopy with biopsy and lysis of adhesions.  Pathology returned:  Final Diagnosis   PERITONEAL NODULE, EXCISIONAL BIOPSY:  Benign fibrotic nodule. (See comment)  JFJ/klb    Electronically signed by Boaz Jerez MD on 3/15/2018 at 1624   Comment See result below    The biopsy shows a lobulated fibrotic nodule with some intermixed lymphoid cells. The nodule does not appear neoplastic. One cannot entirely exclude a \"burned out\" lymph node, or other implant. There is no evidence of carcinoma.      Abdominal wall washings benign:  Final Diagnosis    1. ABDOMINAL WASH, WALL:  Negative for malignancy.  Scant cellularity; chronic inflammatory cells and scattered benign mesothelial cells.   2. ABDOMINAL WASH, WALL:  Negative for malignancy.  Scant cellularity; chronic inflammatory cells and scattered benign mesothelial cells.   3. ABDOMINAL WASH, WALL:  Negative for malignancy.  Benign mesothelial cells and mixed inflammatory cells.                4/5/2018 -  Chemotherapy     OP GASTRIC FLOT OXALIplatin / Leucovorin / Fluorouracil/Taxotere           5/9/2018 Genetic Testing     Genetic testing negative for cancer next panel         5/17/2018 Imaging     CT chest abdomen and pelvis showed Shrinkage of the gastric tumor with no evidence of metastasis         6/29/2018 Surgery     Surgery       Procedure:  Subtotal gastrectomy showing 0.7 cm 2 out of 10 node positive moderately differentiated adenocarcinoma of the " stomach.  YpT2 N1 M0 stage IIa            HISTORY OF PRESENT ILLNESS:  The patient is a 53 y.o. female, here for follow up on management of adjuvant therapy gastric carcinoma.  Due for course #7 of 8 of FLOT.  She reports some persistent nausea but the nausea is better with dexamethasone daily.  She is now using the Zofran as well.  She denies any numbness or tingling in her fingers or feet but does have cold sensitivity for 3-4 days with the oxaliplatin.  She also has occasional constipation and is using stool softeners and MiraLAX.  She denies any fevers, chills, nose or gum bleeding no abdominal pain or distention.        Past Medical History:   Diagnosis Date   • Abdominal pain    • Acid reflux    • Gastric cancer (CMS/HCC)    • Gastric mass    • Gastric ulcer    • History of transfusion     no reaction 2018   • Wears glasses      Past Surgical History:   Procedure Laterality Date   • CERVICAL BIOPSY  W/ LOOP ELECTRODE EXCISION     • DIAGNOSTIC LAPAROSCOPY N/A 3/13/2018    Procedure: DIAGNOSTIC LAPAROSCOPY WITH BIOPSY, LYSIS OF ADHESIONS;  Surgeon: Nicole Crooks MD;  Location:  [facility];  Service: General   • ENDOSCOPY     • GASTRECTOMY N/A 6/29/2018    Procedure: SUBTOTAL GASTRECTOMY;  Surgeon: Nicole Crooks MD;  Location:  [facility];  Service: General   • TUBAL ABDOMINAL LIGATION     • VENOUS ACCESS DEVICE (PORT) INSERTION         No Known Allergies    Family History and Social History reviewed and changed as necessary      REVIEW OF SYSTEM:   Review of Systems   Constitutional: Negative for appetite change, chills, diaphoresis, fatigue, fever and unexpected weight change.   HENT:   Negative for mouth sores, sore throat and trouble swallowing.    Eyes: Negative for icterus.   Respiratory: Negative for cough, hemoptysis and shortness of breath.    Cardiovascular: Negative for chest pain, leg swelling and palpitations.   Gastrointestinal: Negative for abdominal distention, abdominal pain, blood in stool,  "diarrhea, and vomiting.   Endocrine: Negative for hot flashes.   Genitourinary: Negative for bladder incontinence, difficulty urinating, dysuria, frequency and hematuria.    Musculoskeletal: Negative for gait problem, neck pain and neck stiffness.   Skin: Negative for rash.   Neurological: Negative for dizziness, gait problem, headaches, light-headedness and numbness.   Hematological: Negative for adenopathy. Does not bruise/bleed easily.   Psychiatric/Behavioral: Negative for depression. The patient is not nervous/anxious.    All other systems reviewed and are negative.       PHYSICAL EXAM    Vitals:    09/06/18 0841   BP: 135/70   Pulse: 78   Resp: 16   Temp: 98 °F (36.7 °C)   TempSrc: Temporal Artery    SpO2: 98%   Weight: 76.2 kg (168 lb)   Height: 157.5 cm (62\")     Constitutional: Appears well-developed and well-nourished. No distress.   ECOG: (1) Restricted in physically strenuous activity, ambulatory and able to do work of light nature  HENT:   Head: Normocephalic. Alopecia   Mouth/Throat: Oropharynx is clear and moist.   Eyes: Conjunctivae are normal. Pupils are equal, round, and reactive to light. No scleral icterus.   Neck: Neck supple. No JVD present. No thyromegaly present.   Cardiovascular: Normal rate, regular rhythm and normal heart sounds.    Pulmonary/Chest: Breath sounds normal. No respiratory distress.   Abdominal: Soft. Exhibits no distension and no mass. There is no hepatosplenomegaly. There is no tenderness. There is no rebound and no guarding.   Musculoskeletal:Exhibits no edema, tenderness or deformity.   Neurological: Alert and oriented to person, place, and time. Exhibits normal muscle tone.   Skin: No ecchymosis, no petechiae and no rash noted. Not diaphoretic. No cyanosis. Nails show no clubbing.   Psychiatric: Normal mood and affect.   Vitals reviewed.      No radiology results for the last 7 days          ASSESSMENT & PLAN:    1. Gastric carcinoma  2. Nausea    Discussion: We will " continue with cycle 7 of a planned 8 cycles.  We will watch her counts closely.  She will continue the dexamethasone for nausea as well as taking Zofran and Compazine.  We will see her back in 2 weeks for follow-up evaluation.  She has been instructed to contact us in the interim if any new symptoms arise.      Shilpi Haines, MEHNAZ    08/23/2018

## 2018-09-13 ENCOUNTER — INFUSION (OUTPATIENT)
Dept: ONCOLOGY | Facility: HOSPITAL | Age: 53
End: 2018-09-13

## 2018-09-13 VITALS
HEART RATE: 96 BPM | WEIGHT: 168 LBS | SYSTOLIC BLOOD PRESSURE: 133 MMHG | DIASTOLIC BLOOD PRESSURE: 84 MMHG | RESPIRATION RATE: 16 BRPM | BODY MASS INDEX: 30.91 KG/M2 | HEIGHT: 62 IN | TEMPERATURE: 97.7 F

## 2018-09-13 DIAGNOSIS — C16.9 MALIGNANT NEOPLASM OF STOMACH, UNSPECIFIED LOCATION (HCC): Primary | ICD-10-CM

## 2018-09-13 LAB
ALBUMIN SERPL-MCNC: 4.22 G/DL (ref 3.2–4.8)
ALBUMIN/GLOB SERPL: 1.9 G/DL (ref 1.5–2.5)
ALP SERPL-CCNC: 73 U/L (ref 25–100)
ALT SERPL W P-5'-P-CCNC: 19 U/L (ref 7–40)
ANION GAP SERPL CALCULATED.3IONS-SCNC: 7 MMOL/L (ref 3–11)
AST SERPL-CCNC: 18 U/L (ref 0–33)
BILIRUB SERPL-MCNC: 0.4 MG/DL (ref 0.3–1.2)
BUN BLD-MCNC: 11 MG/DL (ref 9–23)
BUN/CREAT SERPL: 15.9 (ref 7–25)
CALCIUM SPEC-SCNC: 8.9 MG/DL (ref 8.7–10.4)
CHLORIDE SERPL-SCNC: 104 MMOL/L (ref 99–109)
CO2 SERPL-SCNC: 26 MMOL/L (ref 20–31)
CREAT BLD-MCNC: 0.69 MG/DL (ref 0.6–1.3)
CREAT BLDA-MCNC: 0.6 MG/DL (ref 0.6–1.3)
ERYTHROCYTE [DISTWIDTH] IN BLOOD BY AUTOMATED COUNT: 21.6 % (ref 11.3–14.5)
GFR SERPL CREATININE-BSD FRML MDRD: 108 ML/MIN/1.73
GLOBULIN UR ELPH-MCNC: 2.2 GM/DL
GLUCOSE BLD-MCNC: 128 MG/DL (ref 70–100)
HCT VFR BLD AUTO: 32.5 % (ref 34.5–44)
HGB BLD-MCNC: 10.6 G/DL (ref 11.5–15.5)
LYMPHOCYTES # BLD AUTO: 1.5 10*3/MM3 (ref 0.6–4.8)
LYMPHOCYTES NFR BLD AUTO: 18.9 % (ref 24–44)
MCH RBC QN AUTO: 24.9 PG (ref 27–31)
MCHC RBC AUTO-ENTMCNC: 32.6 G/DL (ref 32–36)
MCV RBC AUTO: 76.3 FL (ref 80–99)
MONOCYTES # BLD AUTO: 0.4 10*3/MM3 (ref 0–1)
MONOCYTES NFR BLD AUTO: 4.9 % (ref 0–12)
NEUTROPHILS # BLD AUTO: 5.9 10*3/MM3 (ref 1.5–8.3)
NEUTROPHILS NFR BLD AUTO: 76.2 % (ref 41–71)
PLATELET # BLD AUTO: 280 10*3/MM3 (ref 150–450)
PMV BLD AUTO: 8 FL (ref 6–12)
POTASSIUM BLD-SCNC: 3.7 MMOL/L (ref 3.5–5.5)
PROT SERPL-MCNC: 6.4 G/DL (ref 5.7–8.2)
RBC # BLD AUTO: 4.26 10*6/MM3 (ref 3.89–5.14)
SODIUM BLD-SCNC: 137 MMOL/L (ref 132–146)
WBC NRBC COR # BLD: 7.8 10*3/MM3 (ref 3.5–10.8)

## 2018-09-13 PROCEDURE — 96367 TX/PROPH/DG ADDL SEQ IV INF: CPT

## 2018-09-13 PROCEDURE — 25010000002 PALONOSETRON PER 25 MCG: Performed by: INTERNAL MEDICINE

## 2018-09-13 PROCEDURE — 25010000002 DEXAMETHASONE PER 1 MG: Performed by: INTERNAL MEDICINE

## 2018-09-13 PROCEDURE — 96417 CHEMO IV INFUS EACH ADDL SEQ: CPT

## 2018-09-13 PROCEDURE — 25010000002 LEUCOVORIN 200 MG RECONSTITUTED SOLUTION 1 EACH VIAL: Performed by: INTERNAL MEDICINE

## 2018-09-13 PROCEDURE — 25010000002 FLUOROURACIL PER 500 MG: Performed by: INTERNAL MEDICINE

## 2018-09-13 PROCEDURE — 96375 TX/PRO/DX INJ NEW DRUG ADDON: CPT

## 2018-09-13 PROCEDURE — 85025 COMPLETE CBC W/AUTO DIFF WBC: CPT

## 2018-09-13 PROCEDURE — 25010000002 DOCETAXEL 20 MG/ML CONCENTRATION 1 ML VIAL: Performed by: INTERNAL MEDICINE

## 2018-09-13 PROCEDURE — 96415 CHEMO IV INFUSION ADDL HR: CPT

## 2018-09-13 PROCEDURE — 25010000002 DOCETAXEL 20 MG/ML CONCENTRATION 4 ML VIAL: Performed by: INTERNAL MEDICINE

## 2018-09-13 PROCEDURE — 96416 CHEMO PROLONG INFUSE W/PUMP: CPT

## 2018-09-13 PROCEDURE — 80053 COMPREHEN METABOLIC PANEL: CPT

## 2018-09-13 PROCEDURE — 96413 CHEMO IV INFUSION 1 HR: CPT

## 2018-09-13 PROCEDURE — 82565 ASSAY OF CREATININE: CPT

## 2018-09-13 PROCEDURE — 25010000002 DIPHENHYDRAMINE PER 50 MG: Performed by: INTERNAL MEDICINE

## 2018-09-13 PROCEDURE — 25010000002 OXALIPLATIN PER 0.5 MG: Performed by: INTERNAL MEDICINE

## 2018-09-13 PROCEDURE — 96368 THER/DIAG CONCURRENT INF: CPT

## 2018-09-13 RX ORDER — ONDANSETRON HYDROCHLORIDE 8 MG/1
TABLET, FILM COATED ORAL EVERY 8 HOURS PRN
COMMUNITY
End: 2018-10-11

## 2018-09-13 RX ORDER — PALONOSETRON 0.05 MG/ML
0.25 INJECTION, SOLUTION INTRAVENOUS ONCE
Status: COMPLETED | OUTPATIENT
Start: 2018-09-13 | End: 2018-09-13

## 2018-09-13 RX ORDER — DEXTROSE MONOHYDRATE 50 MG/ML
250 INJECTION, SOLUTION INTRAVENOUS ONCE
Status: COMPLETED | OUTPATIENT
Start: 2018-09-13 | End: 2018-09-13

## 2018-09-13 RX ORDER — POLYETHYLENE GLYCOL 3350 17 G/17G
17 POWDER, FOR SOLUTION ORAL DAILY
COMMUNITY
End: 2018-10-11

## 2018-09-13 RX ORDER — FAMOTIDINE 10 MG/ML
20 INJECTION, SOLUTION INTRAVENOUS ONCE
Status: COMPLETED | OUTPATIENT
Start: 2018-09-13 | End: 2018-09-13

## 2018-09-13 RX ORDER — SODIUM CHLORIDE 0.9 % (FLUSH) 0.9 %
10 SYRINGE (ML) INJECTION AS NEEDED
Status: DISCONTINUED | OUTPATIENT
Start: 2018-09-13 | End: 2018-09-13 | Stop reason: HOSPADM

## 2018-09-13 RX ORDER — DIPHENHYDRAMINE HYDROCHLORIDE 50 MG/ML
25 INJECTION INTRAMUSCULAR; INTRAVENOUS ONCE
Status: COMPLETED | OUTPATIENT
Start: 2018-09-13 | End: 2018-09-13

## 2018-09-13 RX ORDER — SODIUM CHLORIDE 0.9 % (FLUSH) 0.9 %
10 SYRINGE (ML) INJECTION AS NEEDED
Status: CANCELLED | OUTPATIENT
Start: 2018-09-13

## 2018-09-13 RX ORDER — PRENATAL WITH FERROUS FUM AND FOLIC ACID 3080; 920; 120; 400; 22; 1.84; 3; 20; 10; 1; 12; 200; 27; 25; 2 [IU]/1; [IU]/1; MG/1; [IU]/1; MG/1; MG/1; MG/1; MG/1; MG/1; MG/1; UG/1; MG/1; MG/1; MG/1; MG/1
1 TABLET ORAL DAILY
COMMUNITY
End: 2021-08-20

## 2018-09-13 RX ADMIN — PALONOSETRON HYDROCHLORIDE 0.25 MG: 0.25 INJECTION INTRAVENOUS at 10:41

## 2018-09-13 RX ADMIN — DEXAMETHASONE SODIUM PHOSPHATE 12 MG: 4 INJECTION, SOLUTION INTRAMUSCULAR; INTRAVENOUS at 10:45

## 2018-09-13 RX ADMIN — FAMOTIDINE 20 MG: 10 INJECTION, SOLUTION INTRAVENOUS at 10:44

## 2018-09-13 RX ADMIN — FLUOROURACIL 4500 MG: 50 INJECTION, SOLUTION INTRAVENOUS at 14:43

## 2018-09-13 RX ADMIN — OXALIPLATIN 150 MG: 100 INJECTION, SOLUTION, CONCENTRATE INTRAVENOUS at 12:22

## 2018-09-13 RX ADMIN — LEUCOVORIN CALCIUM 370 MG: 200 INJECTION, POWDER, LYOPHILIZED, FOR SOLUTION INTRAMUSCULAR; INTRAVENOUS at 12:22

## 2018-09-13 RX ADMIN — DIPHENHYDRAMINE HYDROCHLORIDE 25 MG: 50 INJECTION, SOLUTION INTRAMUSCULAR; INTRAVENOUS at 10:46

## 2018-09-13 RX ADMIN — DOCETAXEL 95 MG: 20 INJECTION, SOLUTION, CONCENTRATE INTRAVENOUS at 11:09

## 2018-09-13 RX ADMIN — DEXTROSE MONOHYDRATE 250 ML: 50 INJECTION, SOLUTION INTRAVENOUS at 10:41

## 2018-09-14 ENCOUNTER — INFUSION (OUTPATIENT)
Dept: ONCOLOGY | Facility: HOSPITAL | Age: 53
End: 2018-09-14

## 2018-09-14 VITALS
RESPIRATION RATE: 16 BRPM | HEIGHT: 62 IN | TEMPERATURE: 98 F | SYSTOLIC BLOOD PRESSURE: 123 MMHG | DIASTOLIC BLOOD PRESSURE: 95 MMHG | WEIGHT: 166 LBS | BODY MASS INDEX: 30.55 KG/M2 | HEART RATE: 98 BPM

## 2018-09-14 DIAGNOSIS — C16.9 MALIGNANT NEOPLASM OF STOMACH, UNSPECIFIED LOCATION (HCC): Primary | ICD-10-CM

## 2018-09-14 PROCEDURE — 96523 IRRIG DRUG DELIVERY DEVICE: CPT

## 2018-09-14 PROCEDURE — 25010000002 HEPARIN FLUSH (PORCINE) 100 UNIT/ML SOLUTION: Performed by: INTERNAL MEDICINE

## 2018-09-14 RX ORDER — SODIUM CHLORIDE 0.9 % (FLUSH) 0.9 %
10 SYRINGE (ML) INJECTION AS NEEDED
Status: CANCELLED | OUTPATIENT
Start: 2018-09-14

## 2018-09-14 RX ORDER — SODIUM CHLORIDE 0.9 % (FLUSH) 0.9 %
10 SYRINGE (ML) INJECTION AS NEEDED
Status: DISCONTINUED | OUTPATIENT
Start: 2018-09-14 | End: 2018-09-14 | Stop reason: HOSPADM

## 2018-09-14 RX ADMIN — HEPARIN 500 UNITS: 100 SYRINGE at 15:16

## 2018-09-14 RX ADMIN — Medication 10 ML: at 15:16

## 2018-09-27 ENCOUNTER — OFFICE VISIT (OUTPATIENT)
Dept: ONCOLOGY | Facility: CLINIC | Age: 53
End: 2018-09-27

## 2018-09-27 ENCOUNTER — INFUSION (OUTPATIENT)
Dept: ONCOLOGY | Facility: HOSPITAL | Age: 53
End: 2018-09-27

## 2018-09-27 VITALS
HEIGHT: 62 IN | TEMPERATURE: 97.4 F | OXYGEN SATURATION: 98 % | RESPIRATION RATE: 16 BRPM | BODY MASS INDEX: 30.73 KG/M2 | SYSTOLIC BLOOD PRESSURE: 140 MMHG | DIASTOLIC BLOOD PRESSURE: 82 MMHG | WEIGHT: 167 LBS | HEART RATE: 75 BPM

## 2018-09-27 DIAGNOSIS — C16.9 MALIGNANT NEOPLASM OF STOMACH, UNSPECIFIED LOCATION (HCC): Primary | ICD-10-CM

## 2018-09-27 DIAGNOSIS — C16.9 MALIGNANT NEOPLASM OF STOMACH, UNSPECIFIED LOCATION (HCC): ICD-10-CM

## 2018-09-27 LAB
ALBUMIN SERPL-MCNC: 4.1 G/DL (ref 3.2–4.8)
ALBUMIN/GLOB SERPL: 2.1 G/DL (ref 1.5–2.5)
ALP SERPL-CCNC: 69 U/L (ref 25–100)
ALT SERPL W P-5'-P-CCNC: 22 U/L (ref 7–40)
ANION GAP SERPL CALCULATED.3IONS-SCNC: 16 MMOL/L (ref 3–11)
AST SERPL-CCNC: 25 U/L (ref 0–33)
BILIRUB SERPL-MCNC: 0.4 MG/DL (ref 0.3–1.2)
BUN BLD-MCNC: 18 MG/DL (ref 9–23)
BUN/CREAT SERPL: 22.8 (ref 7–25)
CALCIUM SPEC-SCNC: 8.7 MG/DL (ref 8.7–10.4)
CHLORIDE SERPL-SCNC: 107 MMOL/L (ref 99–109)
CO2 SERPL-SCNC: 26 MMOL/L (ref 20–31)
CREAT BLD-MCNC: 0.79 MG/DL (ref 0.6–1.3)
CREAT BLDA-MCNC: 0.8 MG/DL (ref 0.6–1.3)
ERYTHROCYTE [DISTWIDTH] IN BLOOD BY AUTOMATED COUNT: 19.7 % (ref 11.3–14.5)
GFR SERPL CREATININE-BSD FRML MDRD: 92 ML/MIN/1.73
GLOBULIN UR ELPH-MCNC: 2 GM/DL
GLUCOSE BLD-MCNC: 80 MG/DL (ref 70–100)
HCT VFR BLD AUTO: 31.1 % (ref 34.5–44)
HGB BLD-MCNC: 10.3 G/DL (ref 11.5–15.5)
LYMPHOCYTES # BLD AUTO: 1.5 10*3/MM3 (ref 0.6–4.8)
LYMPHOCYTES NFR BLD AUTO: 51.8 % (ref 24–44)
MCH RBC QN AUTO: 25.2 PG (ref 27–31)
MCHC RBC AUTO-ENTMCNC: 33.1 G/DL (ref 32–36)
MCV RBC AUTO: 76.1 FL (ref 80–99)
MONOCYTES # BLD AUTO: 0.5 10*3/MM3 (ref 0–1)
MONOCYTES NFR BLD AUTO: 15.8 % (ref 0–12)
NEUTROPHILS # BLD AUTO: 0.9 10*3/MM3 (ref 1.5–8.3)
NEUTROPHILS NFR BLD AUTO: 32.4 % (ref 41–71)
PLATELET # BLD AUTO: 207 10*3/MM3 (ref 150–450)
PMV BLD AUTO: 8.8 FL (ref 6–12)
POTASSIUM BLD-SCNC: 3.5 MMOL/L (ref 3.5–5.5)
PROT SERPL-MCNC: 6.1 G/DL (ref 5.7–8.2)
RBC # BLD AUTO: 4.09 10*6/MM3 (ref 3.89–5.14)
SODIUM BLD-SCNC: 149 MMOL/L (ref 132–146)
WBC NRBC COR # BLD: 2.9 10*3/MM3 (ref 3.5–10.8)

## 2018-09-27 PROCEDURE — 99213 OFFICE O/P EST LOW 20 MIN: CPT | Performed by: NURSE PRACTITIONER

## 2018-09-27 PROCEDURE — 82565 ASSAY OF CREATININE: CPT

## 2018-09-27 PROCEDURE — 96417 CHEMO IV INFUS EACH ADDL SEQ: CPT

## 2018-09-27 PROCEDURE — 25010000002 PALONOSETRON PER 25 MCG: Performed by: NURSE PRACTITIONER

## 2018-09-27 PROCEDURE — 25010000002 LEUCOVORIN 200 MG RECONSTITUTED SOLUTION 1 EACH VIAL: Performed by: NURSE PRACTITIONER

## 2018-09-27 PROCEDURE — 96416 CHEMO PROLONG INFUSE W/PUMP: CPT

## 2018-09-27 PROCEDURE — 96522 REFILL/MAINT PUMP/RESVR SYST: CPT

## 2018-09-27 PROCEDURE — 85025 COMPLETE CBC W/AUTO DIFF WBC: CPT

## 2018-09-27 PROCEDURE — 96368 THER/DIAG CONCURRENT INF: CPT

## 2018-09-27 PROCEDURE — 25010000002 DOCETAXEL 20 MG/ML CONCENTRATION 4 ML VIAL: Performed by: NURSE PRACTITIONER

## 2018-09-27 PROCEDURE — 96375 TX/PRO/DX INJ NEW DRUG ADDON: CPT

## 2018-09-27 PROCEDURE — 25010000002 OXALIPLATIN PER 0.5 MG: Performed by: NURSE PRACTITIONER

## 2018-09-27 PROCEDURE — 25010000002 FLUOROURACIL PER 500 MG: Performed by: NURSE PRACTITIONER

## 2018-09-27 PROCEDURE — 25010000002 DOCETAXEL 20 MG/ML CONCENTRATION 1 ML VIAL: Performed by: NURSE PRACTITIONER

## 2018-09-27 PROCEDURE — 80053 COMPREHEN METABOLIC PANEL: CPT

## 2018-09-27 PROCEDURE — 96413 CHEMO IV INFUSION 1 HR: CPT

## 2018-09-27 PROCEDURE — 96415 CHEMO IV INFUSION ADDL HR: CPT

## 2018-09-27 PROCEDURE — 25010000002 DEXAMETHASONE PER 1 MG: Performed by: NURSE PRACTITIONER

## 2018-09-27 PROCEDURE — 96411 CHEMO IV PUSH ADDL DRUG: CPT

## 2018-09-27 PROCEDURE — 25010000002 DIPHENHYDRAMINE PER 50 MG: Performed by: NURSE PRACTITIONER

## 2018-09-27 RX ORDER — PALONOSETRON 0.05 MG/ML
0.25 INJECTION, SOLUTION INTRAVENOUS ONCE
Status: CANCELLED | OUTPATIENT
Start: 2018-09-27

## 2018-09-27 RX ORDER — PALONOSETRON 0.05 MG/ML
0.25 INJECTION, SOLUTION INTRAVENOUS ONCE
Status: COMPLETED | OUTPATIENT
Start: 2018-09-27 | End: 2018-09-27

## 2018-09-27 RX ORDER — DIPHENHYDRAMINE HYDROCHLORIDE 50 MG/ML
25 INJECTION INTRAMUSCULAR; INTRAVENOUS ONCE
Status: COMPLETED | OUTPATIENT
Start: 2018-09-27 | End: 2018-09-27

## 2018-09-27 RX ORDER — FAMOTIDINE 10 MG/ML
20 INJECTION, SOLUTION INTRAVENOUS ONCE
Status: CANCELLED
Start: 2018-09-27 | End: 2018-09-27

## 2018-09-27 RX ORDER — DEXTROSE MONOHYDRATE 50 MG/ML
250 INJECTION, SOLUTION INTRAVENOUS ONCE
Status: CANCELLED | OUTPATIENT
Start: 2018-09-27

## 2018-09-27 RX ORDER — DIPHENHYDRAMINE HYDROCHLORIDE 50 MG/ML
25 INJECTION INTRAMUSCULAR; INTRAVENOUS ONCE
Status: CANCELLED
Start: 2018-09-27 | End: 2018-09-27

## 2018-09-27 RX ORDER — FAMOTIDINE 10 MG/ML
20 INJECTION, SOLUTION INTRAVENOUS ONCE
Status: COMPLETED | OUTPATIENT
Start: 2018-09-27 | End: 2018-09-27

## 2018-09-27 RX ADMIN — DOCETAXEL 95 MG: 20 INJECTION, SOLUTION, CONCENTRATE INTRAVENOUS at 10:57

## 2018-09-27 RX ADMIN — PALONOSETRON HYDROCHLORIDE 0.25 MG: 0.25 INJECTION INTRAVENOUS at 10:17

## 2018-09-27 RX ADMIN — FLUOROURACIL 4810 MG: 50 INJECTION, SOLUTION INTRAVENOUS at 14:19

## 2018-09-27 RX ADMIN — DIPHENHYDRAMINE HYDROCHLORIDE 25 MG: 50 INJECTION INTRAMUSCULAR; INTRAVENOUS at 10:20

## 2018-09-27 RX ADMIN — FAMOTIDINE 20 MG: 10 INJECTION, SOLUTION INTRAVENOUS at 10:18

## 2018-09-27 RX ADMIN — LEUCOVORIN CALCIUM 370 MG: 200 INJECTION, POWDER, LYOPHILIZED, FOR SOLUTION INTRAMUSCULAR; INTRAVENOUS at 12:08

## 2018-09-27 RX ADMIN — DEXAMETHASONE SODIUM PHOSPHATE 12 MG: 4 INJECTION, SOLUTION INTRAMUSCULAR; INTRAVENOUS at 10:23

## 2018-09-27 RX ADMIN — OXALIPLATIN 150 MG: 100 INJECTION, SOLUTION, CONCENTRATE INTRAVENOUS at 12:09

## 2018-09-27 NOTE — PROGRESS NOTES
CHIEF COMPLAINT: Adjuvant therapy gastric carcinoma    Problem List:     Gastric cancer (CMS/[facility])    2/6/2018 Initial Diagnosis     Gastric cancer.  53-year-old female with a three-month history of indigestion and upper abdominal pain.  She was started on omeprazole for presumed reflux, symptoms were not relieved.  She underwent an EGD on 2/6/2018 with Dr. Matt that revealed a large, friable and ulcerated mass in the body of the stomach.  Biopsies returned moderately to poorly differentiated adenocarcinoma.  Staging studies including CT scan of the chest abdomen and pelvis were negative for distant disease.  She underwent diagnostic laparoscopic with peritoneal washings to complete staging on 3/13/2018.  Baseline CBC 3/9/2018 WBC 4820, hemoglobin 8.5, hematocrit 28.9%, platelet count 351,000, MCV 64.8, MCH 19.1, MCHC 29.4.  CEA normal at less than 0.50.  HER-2/erik testing from gastric body biopsy was negative.  CMP was normal other than for serum calcium slightly decreased at 8.6.           2/6/2018 Procedure     EGD revealed gastric ulcerated mass in body of stomach.  Pathology returned:   Final Diagnosis    1. GASTRIC BODY BIOPSY:  Invasive moderate to poorly differentiated adenocarcinoma with ulceration (see comment).   2. GASTRIC ANTRUM BIOPSY:  Minimal chronic gastritis without activity.  No intestinal metaplasia or dysplasia identified.  No Helicobacter pylori-like organisms identified on routine stains.   HER2/erik testing negative  FLUORESCENCE IN-SITU HYBRIDIZATION (FISH) REPORT:  Negative/Not Amplified  HER2/WILL-17 Ratio: 1.3           2/9/2018 Imaging     CT abdomen/pelvis IMPRESSION:     There is mild thickening and prominence of the mid gastric wall of the  body of the stomach, middle third.     Extragastric mass is not identified. The lesser sac and retrogastric  spaces are clear.     Visceral tumor, adenopathy, stranding or caking is not currently  identified as described. There is no  "retroperitoneal or retrogastric  adenopathy. The lower lung zones are clear with no evidence of reactive  pleural effusion or occult mass. There is no liver metastasis and no  other acute focal CT abnormality is identified within the abdomen or  pelvis.     Incidental note is made of a nonobstructing stone right kidney right  lower pole.         3/9/2018 Imaging     CT Chest IMPRESSION:  No acute intrathoracic abnormality. No definite evidence of  metastatic disease.         3/13/2018 Procedure     Diagnostic laproscopy with biopsy and lysis of adhesions.  Pathology returned:  Final Diagnosis   PERITONEAL NODULE, EXCISIONAL BIOPSY:  Benign fibrotic nodule. (See comment)  JFJ/klb    Electronically signed by Boaz Jerez MD on 3/15/2018 at 1624   Comment See result below    The biopsy shows a lobulated fibrotic nodule with some intermixed lymphoid cells. The nodule does not appear neoplastic. One cannot entirely exclude a \"burned out\" lymph node, or other implant. There is no evidence of carcinoma.      Abdominal wall washings benign:  Final Diagnosis    1. ABDOMINAL WASH, WALL:  Negative for malignancy.  Scant cellularity; chronic inflammatory cells and scattered benign mesothelial cells.   2. ABDOMINAL WASH, WALL:  Negative for malignancy.  Scant cellularity; chronic inflammatory cells and scattered benign mesothelial cells.   3. ABDOMINAL WASH, WALL:  Negative for malignancy.  Benign mesothelial cells and mixed inflammatory cells.                4/5/2018 -  Chemotherapy     OP GASTRIC FLOT OXALIplatin / Leucovorin / Fluorouracil/Taxotere           5/9/2018 Genetic Testing     Genetic testing negative for cancer next panel         5/17/2018 Imaging     CT chest abdomen and pelvis showed Shrinkage of the gastric tumor with no evidence of metastasis         6/29/2018 Surgery     Surgery       Procedure:  Subtotal gastrectomy showing 0.7 cm 2 out of 10 node positive moderately differentiated adenocarcinoma of the " stomach.  YpT2 N1 M0 stage IIa            HISTORY OF PRESENT ILLNESS:  The patient is a 53 y.o. female, here for follow up on management of adjuvant therapy gastric carcinoma.  Due for course #8 of 8 of FLOT.  She continues to take dexamethasone, Zofran and Compazine for nausea.  The nausea is controlled with this current regimen.  She does have a decreased appetite for approximately the first week after treatment.  She continues to have cold sensitivity for 4-5 days after the oxaliplatin.  Her constipation is managed well with MiraLAX and Colace.  She denies any fevers, chills, nose or gum bleeding.  She denies any abdominal pain or distention.  No melena or hematochezia.        Past Medical History:   Diagnosis Date   • Abdominal pain    • Acid reflux    • Gastric cancer (CMS/HCC)    • Gastric mass    • Gastric ulcer    • History of transfusion     no reaction 2018   • Wears glasses      Past Surgical History:   Procedure Laterality Date   • CERVICAL BIOPSY  W/ LOOP ELECTRODE EXCISION     • DIAGNOSTIC LAPAROSCOPY N/A 3/13/2018    Procedure: DIAGNOSTIC LAPAROSCOPY WITH BIOPSY, LYSIS OF ADHESIONS;  Surgeon: Nicole Crooks MD;  Location:  [facility];  Service: General   • ENDOSCOPY     • GASTRECTOMY N/A 6/29/2018    Procedure: SUBTOTAL GASTRECTOMY;  Surgeon: Nicole Crooks MD;  Location:  [facility];  Service: General   • TUBAL ABDOMINAL LIGATION     • VENOUS ACCESS DEVICE (PORT) INSERTION         No Known Allergies    Family History and Social History reviewed and changed as necessary      REVIEW OF SYSTEM:   Review of Systems   Constitutional: Negative for appetite change, chills, diaphoresis, fatigue, fever and unexpected weight change.   HENT:   Negative for mouth sores, sore throat and trouble swallowing.    Eyes: Negative for icterus.   Respiratory: Negative for cough, hemoptysis and shortness of breath.    Cardiovascular: Negative for chest pain, leg swelling and palpitations.   Gastrointestinal: Negative for  "abdominal distention, abdominal pain, blood in stool, diarrhea, and vomiting.   Endocrine: Negative for hot flashes.   Genitourinary: Negative for bladder incontinence, difficulty urinating, dysuria, frequency and hematuria.    Musculoskeletal: Negative for gait problem, neck pain and neck stiffness.   Skin: Negative for rash.   Neurological: Negative for dizziness, gait problem, headaches, light-headedness and numbness.   Hematological: Negative for adenopathy. Does not bruise/bleed easily.   Psychiatric/Behavioral: Negative for depression. The patient is not nervous/anxious.    All other systems reviewed and are negative.       PHYSICAL EXAM    Vitals:    09/27/18 0837   BP: 140/82   Pulse: 75   Resp: 16   Temp: 97.4 °F (36.3 °C)   TempSrc: Temporal Artery    SpO2: 98%   Weight: 75.8 kg (167 lb)   Height: 157.5 cm (62\")     Constitutional: Appears well-developed and well-nourished. No distress.   ECOG: (1) Restricted in physically strenuous activity, ambulatory and able to do work of light nature  HENT:   Head: Normocephalic. Alopecia   Mouth/Throat: Oropharynx is clear and moist.   Eyes: Conjunctivae are normal. Pupils are equal, round, and reactive to light. No scleral icterus.   Neck: Neck supple. No JVD present. No thyromegaly present.   Cardiovascular: Normal rate, regular rhythm and normal heart sounds.    Pulmonary/Chest: Breath sounds normal. No respiratory distress.   Abdominal: Soft. Exhibits no distension and no mass. There is no hepatosplenomegaly. There is no tenderness. There is no rebound and no guarding.   Musculoskeletal:Exhibits no edema, tenderness or deformity.   Neurological: Alert and oriented to person, place, and time. Exhibits normal muscle tone.   Skin: No ecchymosis, no petechiae and no rash noted. Not diaphoretic. No cyanosis. Nails show no clubbing.   Psychiatric: Normal mood and affect.   Vitals reviewed.      No radiology results for the last 7 days          ASSESSMENT & " PLAN:    1. Gastric carcinoma  2. Nausea Continue dexamethasone, zofran and compazine.     Discussion: We will continue with cycle 8 of a planned 8 cycles.  We will watch her counts closely.  She will continue the dexamethasone for nausea as well as taking Zofran and Compazine.  We will obtain a CT scan of the chest, abdomen and pelvis with IV and oral contrast prior to return to evaluate response to treatment.  We will see her back in 4 weeks for follow-up evaluation.  She has been instructed to contact us in the interim if any new symptoms arise.      Shilpi Haines, APRN    08/23/2018

## 2018-09-28 ENCOUNTER — INFUSION (OUTPATIENT)
Dept: ONCOLOGY | Facility: HOSPITAL | Age: 53
End: 2018-09-28

## 2018-09-28 VITALS
RESPIRATION RATE: 16 BRPM | SYSTOLIC BLOOD PRESSURE: 121 MMHG | WEIGHT: 165 LBS | TEMPERATURE: 97.6 F | BODY MASS INDEX: 30.36 KG/M2 | DIASTOLIC BLOOD PRESSURE: 86 MMHG | HEART RATE: 86 BPM | HEIGHT: 62 IN

## 2018-09-28 DIAGNOSIS — C16.9 MALIGNANT NEOPLASM OF STOMACH, UNSPECIFIED LOCATION (HCC): Primary | ICD-10-CM

## 2018-09-28 PROCEDURE — 25010000002 HEPARIN FLUSH (PORCINE) 100 UNIT/ML SOLUTION: Performed by: INTERNAL MEDICINE

## 2018-09-28 RX ORDER — SODIUM CHLORIDE 0.9 % (FLUSH) 0.9 %
10 SYRINGE (ML) INJECTION AS NEEDED
Status: DISCONTINUED | OUTPATIENT
Start: 2018-09-28 | End: 2018-09-28 | Stop reason: HOSPADM

## 2018-09-28 RX ORDER — SODIUM CHLORIDE 0.9 % (FLUSH) 0.9 %
10 SYRINGE (ML) INJECTION AS NEEDED
OUTPATIENT
Start: 2018-09-28

## 2018-09-28 RX ORDER — HEPARIN SODIUM (PORCINE) LOCK FLUSH IV SOLN 100 UNIT/ML 100 UNIT/ML
300 SOLUTION INTRAVENOUS ONCE
OUTPATIENT
Start: 2018-09-28

## 2018-09-28 RX ORDER — HEPARIN SODIUM (PORCINE) LOCK FLUSH IV SOLN 100 UNIT/ML 100 UNIT/ML
500 SOLUTION INTRAVENOUS AS NEEDED
OUTPATIENT
Start: 2018-09-28

## 2018-09-28 RX ADMIN — HEPARIN 500 UNITS: 100 SYRINGE at 14:15

## 2018-09-28 RX ADMIN — Medication 10 ML: at 14:15

## 2018-10-11 ENCOUNTER — OFFICE VISIT (OUTPATIENT)
Dept: FAMILY MEDICINE CLINIC | Facility: CLINIC | Age: 53
End: 2018-10-11

## 2018-10-11 VITALS
HEART RATE: 78 BPM | BODY MASS INDEX: 31.09 KG/M2 | DIASTOLIC BLOOD PRESSURE: 80 MMHG | RESPIRATION RATE: 16 BRPM | WEIGHT: 170 LBS | TEMPERATURE: 98.3 F | SYSTOLIC BLOOD PRESSURE: 140 MMHG | OXYGEN SATURATION: 97 %

## 2018-10-11 DIAGNOSIS — J06.9 ACUTE URI: Primary | ICD-10-CM

## 2018-10-11 PROCEDURE — 99213 OFFICE O/P EST LOW 20 MIN: CPT | Performed by: FAMILY MEDICINE

## 2018-10-11 RX ORDER — MULTIVIT WITH MINERALS/LUTEIN
1000 TABLET ORAL DAILY
COMMUNITY

## 2018-10-11 RX ORDER — AZITHROMYCIN 250 MG/1
TABLET, FILM COATED ORAL
Qty: 6 TABLET | Refills: 0 | Status: SHIPPED | OUTPATIENT
Start: 2018-10-11 | End: 2018-10-19

## 2018-10-11 NOTE — PROGRESS NOTES
Subjective   Astrid Nobles is a 53 y.o. female.     History of Present Illness   One week chest congestion, no fever.  No chest pain, does not feel winded. No sore throat or swollen glands. Today had one episode of sputum. No chills.  Continues nausea following treatments.   Finished chemotherapy for stomach cancer. Still has chest port in place.   The following portions of the patient's history were reviewed and updated as appropriate: allergies, current medications, past family history, past medical history, past social history, past surgical history and problem list.    Review of Systems   Constitutional: Negative for activity change, chills and fever.   HENT: Positive for congestion. Negative for sinus pressure and sore throat.    Respiratory: Positive for cough. Negative for chest tightness and shortness of breath.        Objective   Physical Exam   Constitutional: She appears well-developed.   HENT:   Mouth/Throat: Oropharynx is clear and moist.   Mild head congestion.   Neck: Neck supple.   Pulmonary/Chest: Breath sounds normal.   Lymphadenopathy:     She has no cervical adenopathy.   Vitals reviewed.      Assessment/Plan   Astrid was seen today for cough.    Diagnoses and all orders for this visit:    Acute URI  -     azithromycin (ZITHROMAX) 250 MG tablet; Take 2 tablets the first day, then 1 tablet daily on days 2 through 5.

## 2018-10-16 ENCOUNTER — DOCUMENTATION (OUTPATIENT)
Dept: ONCOLOGY | Facility: CLINIC | Age: 53
End: 2018-10-16

## 2018-10-18 ENCOUNTER — TELEPHONE (OUTPATIENT)
Dept: ONCOLOGY | Facility: CLINIC | Age: 53
End: 2018-10-18

## 2018-10-18 NOTE — TELEPHONE ENCOUNTER
Patient called back stating she was out today and was getting SOB with exertion. Would like to be seen tomorrow and get a CXR to r/o pneumonia. Advised that I will discuss this with the NP with [place] [facility] to see when she can be put on the schedule. Advised that she experiences worsening SOB to go to the ER for evaluation. Otherwise, will call her in the morning with an appt [place] [facility] [place] visit appropriate after discussing with NP

## 2018-10-18 NOTE — TELEPHONE ENCOUNTER
"Patient called stating that over the past week, when she inhales deeply it causes her to cough. She saw her PCP last week and they gave her a z-ligia. She had yellow sputum with the cough and some chest \"rattling\" prior to taking the z-ligia, and that has since resolved and has now has only occasional clear sputum. She denies fever or other symptoms. Recommended that patient try an OTC allergy medication such as claritin over the next couple of days to see if her symptoms improve. She states she has zyrtec at home and can try that. Advised that if symptoms worsen, to return call. Otherwise continue to monitor this, but if does not improve to call back.  "

## 2018-10-19 ENCOUNTER — OFFICE VISIT (OUTPATIENT)
Dept: ONCOLOGY | Facility: CLINIC | Age: 53
End: 2018-10-19

## 2018-10-19 ENCOUNTER — TELEPHONE (OUTPATIENT)
Dept: ONCOLOGY | Facility: CLINIC | Age: 53
End: 2018-10-19

## 2018-10-19 ENCOUNTER — HOSPITAL ENCOUNTER (OUTPATIENT)
Dept: GENERAL RADIOLOGY | Facility: HOSPITAL | Age: 53
Discharge: HOME | End: 2018-10-19
Admitting: NURSE PRACTITIONER

## 2018-10-19 ENCOUNTER — APPOINTMENT (OUTPATIENT)
Dept: LAB | Facility: HOSPITAL | Age: 53
End: 2018-10-19

## 2018-10-19 VITALS
SYSTOLIC BLOOD PRESSURE: 134 MMHG | DIASTOLIC BLOOD PRESSURE: 85 MMHG | RESPIRATION RATE: 16 BRPM | WEIGHT: 166 LBS | OXYGEN SATURATION: 96 % | HEART RATE: 75 BPM | TEMPERATURE: 97.2 F | BODY MASS INDEX: 30.55 KG/M2 | HEIGHT: 62 IN

## 2018-10-19 DIAGNOSIS — C16.9 MALIGNANT NEOPLASM OF STOMACH, UNSPECIFIED LOCATION (HCC): Primary | ICD-10-CM

## 2018-10-19 DIAGNOSIS — R06.02 SHORTNESS OF BREATH: ICD-10-CM

## 2018-10-19 LAB
ALBUMIN SERPL-MCNC: 4.24 G/DL (ref 3.2–4.8)
ALBUMIN/GLOB SERPL: 2 G/DL (ref 1.5–2.5)
ALP SERPL-CCNC: 98 U/L (ref 25–100)
ALT SERPL W P-5'-P-CCNC: 80 U/L (ref 7–40)
ANION GAP SERPL CALCULATED.3IONS-SCNC: 8 MMOL/L (ref 3–11)
AST SERPL-CCNC: 58 U/L (ref 0–33)
BILIRUB SERPL-MCNC: 0.4 MG/DL (ref 0.3–1.2)
BUN BLD-MCNC: 11 MG/DL (ref 9–23)
BUN/CREAT SERPL: 13.6 (ref 7–25)
CALCIUM SPEC-SCNC: 9.4 MG/DL (ref 8.7–10.4)
CHLORIDE SERPL-SCNC: 106 MMOL/L (ref 99–109)
CO2 SERPL-SCNC: 27 MMOL/L (ref 20–31)
CREAT BLD-MCNC: 0.81 MG/DL (ref 0.6–1.3)
ERYTHROCYTE [DISTWIDTH] IN BLOOD BY AUTOMATED COUNT: 23.7 % (ref 11.3–14.5)
GFR SERPL CREATININE-BSD FRML MDRD: 90 ML/MIN/1.73
GLOBULIN UR ELPH-MCNC: 2.2 GM/DL
GLUCOSE BLD-MCNC: 99 MG/DL (ref 70–100)
HCT VFR BLD AUTO: 37.8 % (ref 34.5–44)
HGB BLD-MCNC: 12.1 G/DL (ref 11.5–15.5)
LYMPHOCYTES # BLD AUTO: 1.7 10*3/MM3 (ref 0.6–4.8)
LYMPHOCYTES NFR BLD AUTO: 33.2 % (ref 24–44)
MCH RBC QN AUTO: 25 PG (ref 27–31)
MCHC RBC AUTO-ENTMCNC: 32 G/DL (ref 32–36)
MCV RBC AUTO: 78.1 FL (ref 80–99)
MONOCYTES # BLD AUTO: 0.6 10*3/MM3 (ref 0–1)
MONOCYTES NFR BLD AUTO: 12.4 % (ref 0–12)
NEUTROPHILS # BLD AUTO: 2.7 10*3/MM3 (ref 1.5–8.3)
NEUTROPHILS NFR BLD AUTO: 54.4 % (ref 41–71)
PLATELET # BLD AUTO: 314 10*3/MM3 (ref 150–450)
PMV BLD AUTO: 8 FL (ref 6–12)
POTASSIUM BLD-SCNC: 4.5 MMOL/L (ref 3.5–5.5)
PROT SERPL-MCNC: 6.4 G/DL (ref 5.7–8.2)
RBC # BLD AUTO: 4.85 10*6/MM3 (ref 3.89–5.14)
SODIUM BLD-SCNC: 141 MMOL/L (ref 132–146)
WBC NRBC COR # BLD: 5 10*3/MM3 (ref 3.5–10.8)

## 2018-10-19 PROCEDURE — 36415 COLL VENOUS BLD VENIPUNCTURE: CPT | Performed by: NURSE PRACTITIONER

## 2018-10-19 PROCEDURE — 71046 X-RAY EXAM CHEST 2 VIEWS: CPT

## 2018-10-19 PROCEDURE — 99214 OFFICE O/P EST MOD 30 MIN: CPT | Performed by: NURSE PRACTITIONER

## 2018-10-19 PROCEDURE — 80053 COMPREHEN METABOLIC PANEL: CPT | Performed by: NURSE PRACTITIONER

## 2018-10-19 PROCEDURE — 85025 COMPLETE CBC W/AUTO DIFF WBC: CPT | Performed by: NURSE PRACTITIONER

## 2018-10-19 RX ORDER — LEVOFLOXACIN 500 MG/1
500 TABLET, FILM COATED ORAL DAILY
Qty: 7 TABLET | Refills: 0 | Status: SHIPPED | OUTPATIENT
Start: 2018-10-19 | End: 2018-10-26

## 2018-10-19 NOTE — TELEPHONE ENCOUNTER
Patient notified of chest x ray results and possible pneumonia left lower lobe. Reinforced importance of taking antibiotic for 7 days. We will follow up with her on Monday to make sure she is starting to improve.

## 2018-10-19 NOTE — PROGRESS NOTES
ONCOLOGY URGENT CARE CLINIC VISIT    Astrid Nobles  [id number]  1965    Chief Complaint: cough and shortness of air       History of present illness:  Astrid Nobles is a 53 y.o. year old female who is currently undergoing treatment for gastric cancer. Patient called the office with complaint of shortness of breath and cough. she has been evaluated by a triage nurse and decision was made to bring patient into the urgent care clinic today for further evaluation.     Ms. Nobles reports a 2 week history of cough and shortness of breath with exertion.  She was seen by her primary care provider last week and was placed on a Z-Estuardo for an upper respiratory infection.  She states the antibiotic did help with the wheezing, she is wheezing less at this time.  She has an occasional cough that is made worse with deep breaths.  She has very little sputum production and it is yellow or clear when she does have sputum.  She denies any rhinorrhea or sore throat.  No fevers or chills.  She completed her last chemotherapy regimen 9/27/2018 and is scheduled for repeat CT scans of the chest abdomen and pelvis on 10/29/2018.    Oncology History:       Gastric cancer (CMS/HCC)    2/6/2018 Initial Diagnosis     Gastric cancer.  53-year-old female with a three-month history of indigestion and upper abdominal pain.  She was started on omeprazole for presumed reflux, symptoms were not relieved.  She underwent an EGD on 2/6/2018 with Dr. Matt that revealed a large, friable and ulcerated mass in the body of the stomach.  Biopsies returned moderately to poorly differentiated adenocarcinoma.  Staging studies including CT scan of the chest abdomen and pelvis were negative for distant disease.  She underwent diagnostic laparoscopic with peritoneal washings to complete staging on 3/13/2018.  Baseline CBC 3/9/2018 WBC 4820, hemoglobin 8.5, hematocrit 28.9%, platelet count 351,000, MCV 64.8, MCH 19.1, MCHC 29.4.  CEA normal at less than  0.50.  HER-2/erik testing from gastric body biopsy was negative.  CMP was normal other than for serum calcium slightly decreased at 8.6.           2/6/2018 Procedure     EGD revealed gastric ulcerated mass in body of stomach.  Pathology returned:   Final Diagnosis    1. GASTRIC BODY BIOPSY:  Invasive moderate to poorly differentiated adenocarcinoma with ulceration (see comment).   2. GASTRIC ANTRUM BIOPSY:  Minimal chronic gastritis without activity.  No intestinal metaplasia or dysplasia identified.  No Helicobacter pylori-like organisms identified on routine stains.   HER2/erik testing negative  FLUORESCENCE IN-SITU HYBRIDIZATION (FISH) REPORT:  Negative/Not Amplified  HER2/WILL-17 Ratio: 1.3           2/9/2018 Imaging     CT abdomen/pelvis IMPRESSION:     There is mild thickening and prominence of the mid gastric wall of the  body of the stomach, middle third.     Extragastric mass is not identified. The lesser sac and retrogastric  spaces are clear.     Visceral tumor, adenopathy, stranding or caking is not currently  identified as described. There is no retroperitoneal or retrogastric  adenopathy. The lower lung zones are clear with no evidence of reactive  pleural effusion or occult mass. There is no liver metastasis and no  other acute focal CT abnormality is identified within the abdomen or  pelvis.     Incidental note is made of a nonobstructing stone right kidney right  lower pole.         3/9/2018 Imaging     CT Chest IMPRESSION:  No acute intrathoracic abnormality. No definite evidence of  metastatic disease.         3/13/2018 Procedure     Diagnostic laproscopy with biopsy and lysis of adhesions.  Pathology returned:  Final Diagnosis   PERITONEAL NODULE, EXCISIONAL BIOPSY:  Benign fibrotic nodule. (See comment)  DORCAS/pam    Electronically signed by Boaz Jerez MD on 3/15/2018 at 1624   Comment See result below    The biopsy shows a lobulated fibrotic nodule with some intermixed lymphoid cells. The nodule  "does not appear neoplastic. One cannot entirely exclude a \"burned out\" lymph node, or other implant. There is no evidence of carcinoma.      Abdominal wall washings benign:  Final Diagnosis    1. ABDOMINAL WASH, WALL:  Negative for malignancy.  Scant cellularity; chronic inflammatory cells and scattered benign mesothelial cells.   2. ABDOMINAL WASH, WALL:  Negative for malignancy.  Scant cellularity; chronic inflammatory cells and scattered benign mesothelial cells.   3. ABDOMINAL WASH, WALL:  Negative for malignancy.  Benign mesothelial cells and mixed inflammatory cells.                4/5/2018 -  Chemotherapy     OP GASTRIC FLOT OXALIplatin / Leucovorin / Fluorouracil/Taxotere           5/9/2018 Genetic Testing     Genetic testing negative for cancer next panel         5/17/2018 Imaging     CT chest abdomen and pelvis showed Shrinkage of the gastric tumor with no evidence of metastasis         6/29/2018 Surgery     Surgery       Procedure:  Subtotal gastrectomy showing 0.7 cm 2 out of 10 node positive moderately differentiated adenocarcinoma of the stomach.  YpT2 N1 M0 stage IIa            Past Medical History:   Diagnosis Date   • Abdominal pain    • Acid reflux    • Gastric cancer (CMS/HCC)    • Gastric mass    • Gastric ulcer    • History of transfusion     no reaction 2018   • Wears glasses        Past Surgical History:   Procedure Laterality Date   • CERVICAL BIOPSY  W/ LOOP ELECTRODE EXCISION     • DIAGNOSTIC LAPAROSCOPY N/A 3/13/2018    Procedure: DIAGNOSTIC LAPAROSCOPY WITH BIOPSY, LYSIS OF ADHESIONS;  Surgeon: Nicloe Crooks MD;  Location: [facility] OR;  Service: General   • ENDOSCOPY     • GASTRECTOMY N/A 6/29/2018    Procedure: SUBTOTAL GASTRECTOMY;  Surgeon: Nicole Crooks MD;  Location: [facility] OR;  Service: General   • TUBAL ABDOMINAL LIGATION     • VENOUS ACCESS DEVICE (PORT) INSERTION         MEDICATIONS: The current medication list was reviewed and reconciled.     Allergies:  has No Known " "Allergies.    Family History   Problem Relation Age of Onset   • Hypertension Mother    • Stomach cancer Sister    • No Known Problems Brother    • No Known Problems Daughter    • No Known Problems Son    • No Known Problems Maternal Grandmother    • No Known Problems Paternal Grandmother    • Breast cancer Maternal Aunt    • No Known Problems Paternal Aunt    • Ovarian cancer Other        Review of Systems    Physical Exam  Vital Signs: /85   Pulse 75   Temp 97.2 °F (36.2 °C)   Resp 16   Ht 157.5 cm (62\")   Wt 75.3 kg (166 lb)   SpO2 96%   BMI 30.36 kg/m²    General Appearance:  alert, cooperative, no apparent distress and appears stated age   Neurologic/Psychiatric: A&O x 3, gait steady, appropriate affect   HEENT:  Normocephalic, without obvious abnormality, mucous membranes moist   Neck: Supple, symmetrical, trachea midline, no adenopathy;  No thyromegaly, masses, or tenderness   Back:   Symmetric, no curvature, ROM normal, no CVA tenderness   Lungs:   Clear to auscultation bilaterally; respirations regular, even, and unlabored bilaterally   Heart:  Regular rate and rhythm, no murmurs appreciated   Abdomen:   Soft, non-tender, non-distended and no organomegaly   Lymph nodes: No cervical, supraclavicular, inguinal or axillary adenopathy noted   Extremities: Normal, atraumatic; no clubbing, cyanosis, or edema    Skin: No rashes, ulcers, or suspicious lesions      ECOG Performance Status: (0) Fully active, able to carry on all predisease performance without restriction      Assessment and Plan:    Diagnoses and all orders for this visit:    Malignant neoplasm of stomach, unspecified location (CMS/HCC)  -     XR Chest PA & Lateral  -     CBC & Differential  -     Comprehensive Metabolic Panel  -     CBC Auto Differential    Shortness of breath  -     XR Chest PA & Lateral  -     CBC & Differential  -     Comprehensive Metabolic Panel  -     CBC Auto Differential    Other orders  -     levoFLOXacin " (LEVAQUIN) 500 MG tablet; Take 1 tablet by mouth Daily for 7 days. 1 tablet      Shortness of air and cough: Her shortness of air and cough is most likely related to upper respiratory infection that she was treated for last week.  I will check a chest x-ray today as well as a CBC and CMP and place her on Levaquin 500 mg by mouth daily.  I will contact her with the results of her x-ray and labs.  If there are any new problems identified with these I will let her know and will make a new plan of care.  Discussed with patient 25 minutes greater than 50% spent in counseling.     Shilpi Haines, MEHNAZ

## 2018-10-26 ENCOUNTER — TELEPHONE (OUTPATIENT)
Dept: ONCOLOGY | Facility: CLINIC | Age: 53
End: 2018-10-26

## 2018-10-26 NOTE — TELEPHONE ENCOUNTER
Called patient to check on her. She called me back today.  States she is improving since her office visit last week.  She does still have some SOA with exertion, but it is better.  No fever.  She is scheduled for CT on Monday.

## 2018-10-26 NOTE — TELEPHONE ENCOUNTER
----- Message from Cari Smith sent at 10/26/2018 12:38 PM EDT -----  Regarding: [facility] - RETURNING CALL   Contact: [phone]  PATIENT CALLED AND SAID SHE WAS RETURNING CALL TO [facility] FROM YESTERDAY

## 2018-10-29 ENCOUNTER — HOSPITAL ENCOUNTER (OUTPATIENT)
Dept: CT IMAGING | Facility: HOSPITAL | Age: 53
Discharge: HOME | End: 2018-10-29
Admitting: NURSE PRACTITIONER

## 2018-10-29 DIAGNOSIS — C16.9 MALIGNANT NEOPLASM OF STOMACH, UNSPECIFIED LOCATION (HCC): ICD-10-CM

## 2018-10-29 PROCEDURE — 74177 CT ABD & PELVIS W/CONTRAST: CPT

## 2018-10-29 PROCEDURE — 25010000002 IOPAMIDOL 61 % SOLUTION: Performed by: NURSE PRACTITIONER

## 2018-10-29 PROCEDURE — 71260 CT THORAX DX C+: CPT

## 2018-10-29 RX ADMIN — IOPAMIDOL 85 ML: 612 INJECTION, SOLUTION INTRAVENOUS at 12:55

## 2018-11-01 ENCOUNTER — OFFICE VISIT (OUTPATIENT)
Dept: ONCOLOGY | Facility: CLINIC | Age: 53
End: 2018-11-01

## 2018-11-01 ENCOUNTER — TELEPHONE (OUTPATIENT)
Dept: ONCOLOGY | Facility: CLINIC | Age: 53
End: 2018-11-01

## 2018-11-01 VITALS
OXYGEN SATURATION: 96 % | HEART RATE: 83 BPM | WEIGHT: 172 LBS | DIASTOLIC BLOOD PRESSURE: 77 MMHG | SYSTOLIC BLOOD PRESSURE: 126 MMHG | BODY MASS INDEX: 31.65 KG/M2 | RESPIRATION RATE: 16 BRPM | HEIGHT: 62 IN | TEMPERATURE: 97.4 F

## 2018-11-01 DIAGNOSIS — C16.9 MALIGNANT NEOPLASM OF STOMACH, UNSPECIFIED LOCATION (HCC): Primary | ICD-10-CM

## 2018-11-01 PROCEDURE — 99215 OFFICE O/P EST HI 40 MIN: CPT | Performed by: INTERNAL MEDICINE

## 2018-11-01 RX ORDER — AZITHROMYCIN 250 MG
CAPSULE ORAL
Qty: 6 TABLET | Refills: 0 | Status: SHIPPED | OUTPATIENT
Start: 2018-11-01 | End: 2018-11-21

## 2018-11-01 NOTE — TELEPHONE ENCOUNTER
Clarification if generic z ligia could be given.  Received verbal order Dr Oswald for generic z ligia

## 2018-11-01 NOTE — TELEPHONE ENCOUNTER
----- Message from Asher Perez sent at 11/1/2018  3:53 PM EDT -----  Regarding: [facility] - RX CLARIFICATION  Contact: [phone]  Pharmacist from [facility] [facility] [place]) called to get clarification on a Rx for Ms. Nobles. Rx is for Z-ligia. Pharmacist can be reached at [phone]. Thanks!

## 2018-11-19 ENCOUNTER — TELEPHONE (OUTPATIENT)
Dept: ONCOLOGY | Facility: CLINIC | Age: 53
End: 2018-11-19

## 2018-11-19 DIAGNOSIS — C16.9 MALIGNANT NEOPLASM OF STOMACH, UNSPECIFIED LOCATION (HCC): ICD-10-CM

## 2018-11-19 DIAGNOSIS — R06.02 SHORTNESS OF BREATH: Primary | ICD-10-CM

## 2018-11-19 NOTE — TELEPHONE ENCOUNTER
Discussed with Dr. Oswald.  Will refer to pulmonology.  Patient aware.  Order entered.  Message sent to Ted to schedule.

## 2018-11-19 NOTE — TELEPHONE ENCOUNTER
----- Message from Selma Ramos RN sent at 11/19/2018 12:05 PM EST -----  Contact: [phone]  Received call from patient through triage line at 10:30am. She c/o continued shortness of breath. This has been going on for several weeks. No worsening/ changes with the shortness of breath- just persistent. She was evaluated in our C last month for this issue and has since then seen Dr Oswald. Per pt she previously had a cough, but that has resolved. SOA is worse with exertion, but also sometimes happens when she is sitting still. Pt describes as needing to take a deep breath.   She completed FLOT chemotherapy on 9-27-18.

## 2018-11-21 ENCOUNTER — OFFICE VISIT (OUTPATIENT)
Dept: PULMONOLOGY | Facility: CLINIC | Age: 53
End: 2018-11-21

## 2018-11-21 VITALS
BODY MASS INDEX: 31.47 KG/M2 | WEIGHT: 171 LBS | HEIGHT: 62 IN | SYSTOLIC BLOOD PRESSURE: 138 MMHG | OXYGEN SATURATION: 96 % | DIASTOLIC BLOOD PRESSURE: 86 MMHG | HEART RATE: 76 BPM | TEMPERATURE: 97.6 F

## 2018-11-21 DIAGNOSIS — Z23 IMMUNIZATION DUE: ICD-10-CM

## 2018-11-21 DIAGNOSIS — R05.9 COUGH: ICD-10-CM

## 2018-11-21 DIAGNOSIS — R06.02 SHORTNESS OF BREATH: Primary | ICD-10-CM

## 2018-11-21 PROCEDURE — 94375 RESPIRATORY FLOW VOLUME LOOP: CPT | Performed by: INTERNAL MEDICINE

## 2018-11-21 PROCEDURE — 90471 IMMUNIZATION ADMIN: CPT | Performed by: INTERNAL MEDICINE

## 2018-11-21 PROCEDURE — 94726 PLETHYSMOGRAPHY LUNG VOLUMES: CPT | Performed by: INTERNAL MEDICINE

## 2018-11-21 PROCEDURE — 94729 DIFFUSING CAPACITY: CPT | Performed by: INTERNAL MEDICINE

## 2018-11-21 PROCEDURE — 99205 OFFICE O/P NEW HI 60 MIN: CPT | Performed by: INTERNAL MEDICINE

## 2018-11-21 PROCEDURE — 90674 CCIIV4 VAC NO PRSV 0.5 ML IM: CPT | Performed by: INTERNAL MEDICINE

## 2018-11-21 RX ORDER — OMEPRAZOLE 40 MG/1
40 CAPSULE, DELAYED RELEASE ORAL DAILY
Qty: 30 CAPSULE | Refills: 6 | Status: SHIPPED | OUTPATIENT
Start: 2018-11-21 | End: 2019-02-01

## 2018-11-21 RX ORDER — FLUTICASONE PROPIONATE 50 MCG
2 SPRAY, SUSPENSION (ML) NASAL 2 TIMES DAILY
Qty: 1 BOTTLE | Refills: 6 | Status: SHIPPED | OUTPATIENT
Start: 2018-11-21 | End: 2018-12-17 | Stop reason: HOSPADM

## 2018-11-21 NOTE — PROGRESS NOTES
CC:    Cough / SOA    HPI:    54 y/o AAF w/ h/o GERD, recently diagnosed Gastric Cancer Stage IIA, lifetime non-smoker, s/p partial gastrectomy in June, also Chemo (oxaliplatin, leucovorin, fluorouracile, taxotere) x 8 rounds, no radiation.  Completed that at the end of September.  She presents today w/ chronic cough and SOA.  Reviewing her prior CT scans, she has subtle GGO in the extreme bases that have become progressively more dense and on most recent CT have the appearance of bi-basilar infiltrates.  She doesn't perceive any reflux, chest tightness, or wheezing.  She notes PRICE, particularly with heavy activity or going up stairs.  She has also had a dry cough.  Currently has some post-nasal gtt.  Recently treated with 2 rounds of abx that improved cough but not dyspnea.  No chest pain.    PMH:    Past Medical History:   Diagnosis Date   • Abdominal pain    • Acid reflux    • Gastric cancer (CMS/HCC)    • Gastric mass    • Gastric ulcer    • History of transfusion     no reaction 2018   • Wears glasses      PSH:    Past Surgical History:   Procedure Laterality Date   • CERVICAL BIOPSY  W/ LOOP ELECTRODE EXCISION     • ENDOSCOPY     • TUBAL ABDOMINAL LIGATION     • VENOUS ACCESS DEVICE (PORT) INSERTION       FH:    Family History   Problem Relation Age of Onset   • Hypertension Mother    • Stomach cancer Sister    • No Known Problems Brother    • No Known Problems Daughter    • No Known Problems Son    • No Known Problems Maternal Grandmother    • No Known Problems Paternal Grandmother    • Breast cancer Maternal Aunt    • No Known Problems Paternal Aunt    • Ovarian cancer Other      SH:    Social History     Socioeconomic History   • Marital status: Single     Spouse name: Not on file   • Number of children: Not on file   • Years of education: Not on file   • Highest education level: Not on file   Social Needs   • Financial resource strain: Not on file   • Food insecurity - worry: Not on file   • Food  insecurity - inability: Not on file   • Transportation needs - medical: Not on file   • Transportation needs - non-medical: Not on file   Occupational History   • Not on file   Tobacco Use   • Smoking status: Never Smoker   • Smokeless tobacco: Never Used   Substance and Sexual Activity   • Alcohol use: Yes     Comment: occ, nothing weekly    • Drug use: No   • Sexual activity: Defer   Other Topics Concern   • Not on file   Social History Narrative   • Not on file     ALLERGIES:    No Known Allergies  MEDICATIONS:      Current Outpatient Medications:   •  ascorbic acid (VITAMIN C) 1000 MG tablet, Take 1,000 mg by mouth Daily., Disp: , Rfl:   •  Prenatal Vit-Fe Fumarate-FA (PRENATAL 27-1) 27-1 MG tablet tablet, Take  by mouth Daily., Disp: , Rfl:   No current facility-administered medications for this visit.     Facility-Administered Medications Ordered in Other Visits:   •  heparin flush (porcine) 100 UNIT/ML injection 500 Units, 500 Units, Intravenous, PRN, Kaden Oswald MD  •  sodium chloride 0.9 % flush 10 mL, 10 mL, Intravenous, PRN, Kaden Oswald MD  •  sodium chloride 0.9 % infusion, 100 mL/hr, Intravenous, Continuous, MartinKathy ash APRN, Last Rate: 100 mL/hr at 08/09/18 1125, 100 mL/hr at 08/09/18 1125  ROS:  Per HPI, otherwise all systems reviewed and negative.    DIAGNOSTIC DATA (Reviewed and interpreted by me unless otherwise specified):    CT Chest 10/29/18 - normal lung parenchyma except for bibasilar infiltrates.  Reviewing scans dating back to 3/2018, and even CT abdomen 2/2018, there have been subtle GGO changes in bases that have gotten progressively worse.    PFT 11/21/18 - moderate restriction, no obstruction, low ERV, moderate reduction in DLCO overcorrects for alveolar volume    Vitals:    11/21/18 1307   BP: 138/86   Pulse: 76   Temp: 97.6 °F (36.4 °C)   SpO2: 96%       Physical Exam   Constitutional: Oriented to person, place, and time. Appears well-developed and well-nourished.   Head:  Normocephalic and atraumatic.   Nose: Nose normal.   Mouth/Throat: Oropharynx is clear and moist.   Eyes: Conjunctivae are normal.  Pupils normal.  Neck: No tracheal deviation present.   Cardiovascular: Normal rate, regular rhythm, normal heart sounds and intact distal pulses.  Exam reveals no gallop and no friction rub.  No thrill.  No JVD.  No edema.  No murmur heard.  Pulmonary/Chest: Effort normal and breath sounds normal.  No tenderness to palpation.  No clubbing.   Abdominal: Soft. Bowel sounds are normal. No distension. No tenderness. There is no guarding.   Musculoskeletal: Normal range of motion.  No tenderness.  Lymphadenopathy:  No cervical adenopathy.   Neurological:  No new focal neurological deficits observed   Skin: Skin is warm and dry. No rash noted.   Psychiatric: Normal mood and affect.  Behavior is normal. Judgment normal.    Assessment/Plan     1)  Dyspnea - suspicious she could be refluxing into her lungs and have asthma.  Will start PPI 40 daily and give trial of Trelegy.  Reflux precautions given.  Can't r/o atypical angina.  Get stress echo to r/o CAD and structural cardiac disease.  Could be drug induced pneumonitis as well, chemo started in April, finished at the end of Sept.    2)  Cough - probably GERD, currently has post nasal gtt as well.  PPI, flonase.  Check Barium Esophagram.    RTC 2 months    Pio Wiseman MD  Pulmonology and Critical Care Medicine  11/21/18 1:41 PM  Electronically Signed    C.C.:  No ref. provider found, Nimo, Raf Santiago MD

## 2018-11-26 DIAGNOSIS — R06.00 DYSPNEA, UNSPECIFIED TYPE: Primary | ICD-10-CM

## 2018-11-26 DIAGNOSIS — R05.9 COUGH: ICD-10-CM

## 2018-11-27 ENCOUNTER — TELEPHONE (OUTPATIENT)
Dept: ONCOLOGY | Facility: CLINIC | Age: 53
End: 2018-11-27

## 2018-11-27 NOTE — TELEPHONE ENCOUNTER
----- Message from Citlalli Samayoa sent at 11/26/2018  3:19 PM EST -----  Regarding: [facility] - PORT REMOVAL QUESTION  Contact: [phone]  PATIENT IS CALLING BECAUSE SHE WOULD LIKE TO DISCUSS GETTING HER PORT REMOVED.  SHE WOULD LIKE A CALL BACK IN ORDER TO START THE PROCESS.

## 2018-11-27 NOTE — TELEPHONE ENCOUNTER
Ok per Kathy to have port removed.  Discussed with patient. Ted CARDOZA working on arranging with [facility].

## 2018-11-28 ENCOUNTER — HOSPITAL ENCOUNTER (OUTPATIENT)
Dept: GENERAL RADIOLOGY | Facility: HOSPITAL | Age: 53
Discharge: HOME | End: 2018-11-28
Attending: INTERNAL MEDICINE | Admitting: INTERNAL MEDICINE

## 2018-11-28 DIAGNOSIS — R05.9 COUGH: ICD-10-CM

## 2018-11-28 PROCEDURE — 74220 X-RAY XM ESOPHAGUS 1CNTRST: CPT

## 2018-11-28 RX ADMIN — BARIUM SULFATE 100 ML: 960 POWDER, FOR SUSPENSION ORAL at 10:37

## 2018-12-17 ENCOUNTER — CLINICAL SUPPORT (OUTPATIENT)
Dept: ONCOLOGY | Facility: CLINIC | Age: 53
End: 2018-12-17

## 2018-12-17 ENCOUNTER — LAB (OUTPATIENT)
Dept: LAB | Facility: HOSPITAL | Age: 53
End: 2018-12-17

## 2018-12-17 VITALS
SYSTOLIC BLOOD PRESSURE: 133 MMHG | HEIGHT: 62 IN | TEMPERATURE: 98.3 F | DIASTOLIC BLOOD PRESSURE: 73 MMHG | RESPIRATION RATE: 17 BRPM | BODY MASS INDEX: 31.47 KG/M2 | WEIGHT: 171 LBS | HEART RATE: 66 BPM

## 2018-12-17 DIAGNOSIS — C16.2 MALIGNANT NEOPLASM OF BODY OF STOMACH (HCC): Primary | ICD-10-CM

## 2018-12-17 DIAGNOSIS — C16.9 MALIGNANT NEOPLASM OF STOMACH, UNSPECIFIED LOCATION (HCC): ICD-10-CM

## 2018-12-17 LAB
ALBUMIN SERPL-MCNC: 4.33 G/DL (ref 3.2–4.8)
ALBUMIN/GLOB SERPL: 2.1 G/DL (ref 1.5–2.5)
ALP SERPL-CCNC: 81 U/L (ref 25–100)
ALT SERPL W P-5'-P-CCNC: 20 U/L (ref 7–40)
ANION GAP SERPL CALCULATED.3IONS-SCNC: 10 MMOL/L (ref 3–11)
AST SERPL-CCNC: 22 U/L (ref 0–33)
BILIRUB SERPL-MCNC: 0.4 MG/DL (ref 0.3–1.2)
BUN BLD-MCNC: 13 MG/DL (ref 9–23)
BUN/CREAT SERPL: 15.9 (ref 7–25)
CALCIUM SPEC-SCNC: 9.4 MG/DL (ref 8.7–10.4)
CHLORIDE SERPL-SCNC: 107 MMOL/L (ref 99–109)
CO2 SERPL-SCNC: 24 MMOL/L (ref 20–31)
CREAT BLD-MCNC: 0.82 MG/DL (ref 0.6–1.3)
ERYTHROCYTE [DISTWIDTH] IN BLOOD BY AUTOMATED COUNT: 19.5 % (ref 11.3–14.5)
GFR SERPL CREATININE-BSD FRML MDRD: 88 ML/MIN/1.73
GLOBULIN UR ELPH-MCNC: 2.1 GM/DL
GLUCOSE BLD-MCNC: 92 MG/DL (ref 70–100)
HCT VFR BLD AUTO: 35.9 % (ref 34.5–44)
HGB BLD-MCNC: 11.7 G/DL (ref 11.5–15.5)
LYMPHOCYTES # BLD AUTO: 1.7 10*3/MM3 (ref 0.6–4.8)
LYMPHOCYTES NFR BLD AUTO: 40.3 % (ref 24–44)
MCH RBC QN AUTO: 26.3 PG (ref 27–31)
MCHC RBC AUTO-ENTMCNC: 32.7 G/DL (ref 32–36)
MCV RBC AUTO: 80.3 FL (ref 80–99)
MONOCYTES # BLD AUTO: 0.2 10*3/MM3 (ref 0–1)
MONOCYTES NFR BLD AUTO: 5.6 % (ref 0–12)
NEUTROPHILS # BLD AUTO: 2.3 10*3/MM3 (ref 1.5–8.3)
NEUTROPHILS NFR BLD AUTO: 54.1 % (ref 41–71)
PLATELET # BLD AUTO: 244 10*3/MM3 (ref 150–450)
PMV BLD AUTO: 8.7 FL (ref 6–12)
POTASSIUM BLD-SCNC: 4.3 MMOL/L (ref 3.5–5.5)
PROT SERPL-MCNC: 6.4 G/DL (ref 5.7–8.2)
RBC # BLD AUTO: 4.47 10*6/MM3 (ref 3.89–5.14)
SODIUM BLD-SCNC: 141 MMOL/L (ref 132–146)
WBC NRBC COR # BLD: 4.3 10*3/MM3 (ref 3.5–10.8)

## 2018-12-17 PROCEDURE — 85025 COMPLETE CBC W/AUTO DIFF WBC: CPT

## 2018-12-17 PROCEDURE — 80053 COMPREHEN METABOLIC PANEL: CPT

## 2018-12-17 PROCEDURE — 36415 COLL VENOUS BLD VENIPUNCTURE: CPT

## 2018-12-17 PROCEDURE — 99214 OFFICE O/P EST MOD 30 MIN: CPT | Performed by: NURSE PRACTITIONER

## 2018-12-17 NOTE — PROGRESS NOTES
MEDICAL ONCOLOGY CANCER SURVIVORSHIP VISIT    Astrid Nobles  [id number]  1965    Chief Complaint: Arthralgias of bilateral knees, follow-up for gastric cancer      History of present illness:  Astrid Nobles is a 53 y.o. year old female who is here today for the Cancer Survivorship visit, see oncology history below.  The patient is a very pleasant 53-year-old female diagnosed with gastric cancer February 2018.  She underwent neoadjuvant chemotherapy followed by surgery followed by adjuvant chemotherapy and overall tolerated quite well.  She states that she has noticed changes in her knees, she reports a ache when she is changing positions such as when she is getting up from a seated position on the floor, or when she first gets up in the morning and starts to move around.  The pain improves with movement, it is not persistent.  She has had no fevers or chills.  She does take ibuprofen occasionally with good relief.  She is eating well.  She has had no abdominal pain.  She does take omeprazole for gastric reflux.  She is following along with pulmonology concerning her dyspnea.  He states that this is about the same as it was in October.     Cancer History:      Malignant neoplasm of body of stomach (CMS/HCC)    2/6/2018 Initial Diagnosis     Gastric cancer.  53-year-old female with a three-month history of indigestion and upper abdominal pain.  She was started on omeprazole for presumed reflux, symptoms were not relieved.  She underwent an EGD on 2/6/2018 with Dr. Matt that revealed a large, friable and ulcerated mass in the body of the stomach.  Biopsies returned moderately to poorly differentiated adenocarcinoma.  Staging studies including CT scan of the chest abdomen and pelvis were negative for distant disease.  She underwent diagnostic laparoscopic with peritoneal washings to complete staging on 3/13/2018.  Baseline CBC 3/9/2018 WBC 4820, hemoglobin 8.5, hematocrit 28.9%, platelet count 351,000, MCV  64.8, MCH 19.1, MCHC 29.4.  CEA normal at less than 0.50.  HER-2/erik testing from gastric body biopsy was negative.  CMP was normal other than for serum calcium slightly decreased at 8.6.           2/6/2018 Procedure     EGD revealed gastric ulcerated mass in body of stomach.  Pathology returned:   Final Diagnosis    1. GASTRIC BODY BIOPSY:  Invasive moderate to poorly differentiated adenocarcinoma with ulceration (see comment).   2. GASTRIC ANTRUM BIOPSY:  Minimal chronic gastritis without activity.  No intestinal metaplasia or dysplasia identified.  No Helicobacter pylori-like organisms identified on routine stains.   HER2/erik testing negative  FLUORESCENCE IN-SITU HYBRIDIZATION (FISH) REPORT:  Negative/Not Amplified  HER2/WILL-17 Ratio: 1.3           2/9/2018 Imaging     CT abdomen/pelvis IMPRESSION:     There is mild thickening and prominence of the mid gastric wall of the  body of the stomach, middle third.     Extragastric mass is not identified. The lesser sac and retrogastric  spaces are clear.     Visceral tumor, adenopathy, stranding or caking is not currently  identified as described. There is no retroperitoneal or retrogastric  adenopathy. The lower lung zones are clear with no evidence of reactive  pleural effusion or occult mass. There is no liver metastasis and no  other acute focal CT abnormality is identified within the abdomen or  pelvis.     Incidental note is made of a nonobstructing stone right kidney right  lower pole.         3/9/2018 Imaging     CT Chest IMPRESSION:  No acute intrathoracic abnormality. No definite evidence of  metastatic disease.         3/13/2018 Procedure     Diagnostic laproscopy with biopsy and lysis of adhesions.  Pathology returned:  Final Diagnosis   PERITONEAL NODULE, EXCISIONAL BIOPSY:  Benign fibrotic nodule. (See comment)  DORCAS/pam    Electronically signed by Boaz Jerez MD on 3/15/2018 at 1620   Comment See result below    The biopsy shows a lobulated fibrotic  "nodule with some intermixed lymphoid cells. The nodule does not appear neoplastic. One cannot entirely exclude a \"burned out\" lymph node, or other implant. There is no evidence of carcinoma.      Abdominal wall washings benign:  Final Diagnosis    1. ABDOMINAL WASH, WALL:  Negative for malignancy.  Scant cellularity; chronic inflammatory cells and scattered benign mesothelial cells.   2. ABDOMINAL WASH, WALL:  Negative for malignancy.  Scant cellularity; chronic inflammatory cells and scattered benign mesothelial cells.   3. ABDOMINAL WASH, WALL:  Negative for malignancy.  Benign mesothelial cells and mixed inflammatory cells.                4/5/2018 - 9/27/2018 Chemotherapy     OP GASTRIC FLOT OXALIplatin / Leucovorin / Fluorouracil/Taxotere  x8         5/9/2018 Genetic Testing     Genetic testing negative for cancer next panel         5/17/2018 Imaging     CT chest abdomen and pelvis showed Shrinkage of the gastric tumor with no evidence of metastasis         6/29/2018 Surgery     Surgery       Procedure:  Subtotal gastrectomy showing 0.7 cm 2 out of 10 node positive moderately differentiated adenocarcinoma of the stomach.  YpT2 N1 M0 stage IIa         10/29/2018 Imaging     CT chest abdomen pelvis with contrast shows no evidence of metastasis or recurrence but does have bilateral basilar pulmonary infiltrates.  Was started on Levaquin 10/19/18.            Past Medical History:   Diagnosis Date   • Abdominal pain    • Acid reflux    • Gastric cancer (CMS/HCC)    • Gastric mass    • Gastric ulcer    • History of transfusion     no reaction 2018   • Wears glasses        Past Surgical History:   Procedure Laterality Date   • CERVICAL BIOPSY  W/ LOOP ELECTRODE EXCISION     • ENDOSCOPY     • TUBAL ABDOMINAL LIGATION     • VENOUS ACCESS DEVICE (PORT) INSERTION         MEDICATIONS: The current medication list was reviewed and reconciled.     Allergies:  has No Known Allergies.    Family History   Problem Relation Age of " "Onset   • Hypertension Mother    • Stomach cancer Sister    • No Known Problems Brother    • No Known Problems Daughter    • No Known Problems Son    • No Known Problems Maternal Grandmother    • No Known Problems Paternal Grandmother    • Breast cancer Maternal Aunt    • No Known Problems Paternal Aunt    • Ovarian cancer Other          Review of Systems   Constitutional: Negative for fatigue, fever and unexpected weight change.   HENT: Negative for congestion, hearing loss, sore throat and trouble swallowing.    Eyes: Negative for visual disturbance.   Respiratory: Positive for cough (occasional dry cough) and shortness of breath (with exertion). Negative for wheezing.    Cardiovascular: Negative for chest pain and leg swelling.   Gastrointestinal: Negative for abdominal distention, abdominal pain, constipation, diarrhea, nausea and vomiting.   Endocrine: Negative.    Genitourinary: Negative.    Musculoskeletal: Positive for arthralgias (bilateral knees). Negative for back pain and gait problem.   Skin: Negative.    Allergic/Immunologic: Negative.    Neurological: Negative for dizziness, weakness, numbness and headaches.   Hematological: Negative for adenopathy. Does not bruise/bleed easily.   Psychiatric/Behavioral: Negative.    All other systems reviewed and are negative.      Physical Exam  Vital Signs: /73   Pulse 66   Temp 98.3 °F (36.8 °C) (Temporal)   Resp 17   Ht 157.5 cm (62\")   Wt 77.6 kg (171 lb)   BMI 31.28 kg/m²    General Appearance:  alert, cooperative, no apparent distress, appears stated age and normal weight   Neurologic/Psychiatric: A&O x 3, gait steady, appropriate affect   HEENT:  Normocephalic, without obvious abnormality, mucous membranes moist   Neck: Supple, symmetrical, trachea midline, no adenopathy;  No thyromegaly, masses, or tenderness       Lungs:   Clear to auscultation bilaterally; respirations regular, even, and unlabored bilaterally   Heart:  Regular rate and rhythm, " no murmurs appreciated   Abdomen:   Soft, non-tender and non-distended   Lymph nodes: No cervical or supraclavicular adenopathy noted   Extremities: Normal, atraumatic; no clubbing, cyanosis, or edema      ECOG Performance Status: (0) Fully active, able to carry on all predisease performance without restriction        Assessment and Plan:  Diagnoses and all orders for this visit:    Malignant neoplasm of body of stomach (CMS/HCC)  -     CT Chest With Contrast; Future  -     CT Abdomen Pelvis With Contrast; Future  -     CBC & Differential; Future  -     Comprehensive Metabolic Panel; Future        Discussion:      The patient and I have reviewed [facility] personal Survivorship Care Plan in detail. We discussed diagnosis, pathology, histology, all treatments, and ongoing surveillance recommendations. All questions were answered to her satisfaction. The patient is in agreement with our plan for ongoing surveillance as outlined in the plan. A copy of this document was provided at the completion of our visit.  A copy has also been sent to the patient's primary care provider.    CBC today was unremarkable, CMP is pending.  We plan on repeating CT chest abdomen and pelvis again in April for a 6 month follow-up according to NCCN guidelines.  She will continue to follow with pulmonology regarding her dyspnea.    This was a 30 minute visit with 25 minutes spent in direct face to face review of the Survivorship Care Plan.    Return to clinic in 4 months for ongoing cancer surveillance.        Kathy Salazar, APRN  12/17/2018

## 2018-12-18 ENCOUNTER — TELEPHONE (OUTPATIENT)
Dept: ONCOLOGY | Facility: CLINIC | Age: 53
End: 2018-12-18

## 2018-12-18 NOTE — TELEPHONE ENCOUNTER
----- Message from MEHNAZ Disla sent at 12/18/2018  9:19 AM EST -----  Regarding: cmp  Please let Ms. Nobles know her CMP from yesterday was normal.  Thank you.

## 2018-12-20 ENCOUNTER — ANESTHESIA EVENT (OUTPATIENT)
Dept: PERIOP | Facility: HOSPITAL | Age: 53
End: 2018-12-20

## 2018-12-20 RX ORDER — FAMOTIDINE 10 MG/ML
20 INJECTION, SOLUTION INTRAVENOUS ONCE
Status: CANCELLED | OUTPATIENT
Start: 2018-12-20 | End: 2018-12-20

## 2018-12-21 ENCOUNTER — ANESTHESIA (OUTPATIENT)
Dept: PERIOP | Facility: HOSPITAL | Age: 53
End: 2018-12-21

## 2018-12-21 ENCOUNTER — HOSPITAL ENCOUNTER (OUTPATIENT)
Facility: HOSPITAL | Age: 53
Setting detail: HOSPITAL OUTPATIENT SURGERY
Discharge: HOME | End: 2018-12-21
Attending: SURGERY | Admitting: SURGERY

## 2018-12-21 VITALS
HEIGHT: 62 IN | TEMPERATURE: 98.4 F | RESPIRATION RATE: 19 BRPM | SYSTOLIC BLOOD PRESSURE: 124 MMHG | BODY MASS INDEX: 30.91 KG/M2 | OXYGEN SATURATION: 99 % | WEIGHT: 168 LBS | HEART RATE: 72 BPM | DIASTOLIC BLOOD PRESSURE: 80 MMHG

## 2018-12-21 LAB
B-HCG UR QL: NEGATIVE
INTERNAL NEGATIVE CONTROL: NEGATIVE
INTERNAL POSITIVE CONTROL: POSITIVE
Lab: NORMAL

## 2018-12-21 PROCEDURE — 81025 URINE PREGNANCY TEST: CPT | Performed by: ANESTHESIOLOGY

## 2018-12-21 RX ORDER — LABETALOL HYDROCHLORIDE 5 MG/ML
5 INJECTION, SOLUTION INTRAVENOUS
Status: DISCONTINUED | OUTPATIENT
Start: 2018-12-21 | End: 2018-12-22 | Stop reason: HOSPADM

## 2018-12-21 RX ORDER — LIDOCAINE HYDROCHLORIDE 10 MG/ML
INJECTION, SOLUTION INFILTRATION; PERINEURAL AS NEEDED
Status: DISCONTINUED | OUTPATIENT
Start: 2018-12-21 | End: 2018-12-21 | Stop reason: HOSPADM

## 2018-12-21 RX ORDER — IPRATROPIUM BROMIDE AND ALBUTEROL SULFATE 2.5; .5 MG/3ML; MG/3ML
3 SOLUTION RESPIRATORY (INHALATION) ONCE AS NEEDED
Status: DISCONTINUED | OUTPATIENT
Start: 2018-12-21 | End: 2018-12-22 | Stop reason: HOSPADM

## 2018-12-21 RX ORDER — PROMETHAZINE HYDROCHLORIDE 25 MG/ML
6.25 INJECTION, SOLUTION INTRAMUSCULAR; INTRAVENOUS ONCE AS NEEDED
Status: DISCONTINUED | OUTPATIENT
Start: 2018-12-21 | End: 2018-12-22 | Stop reason: HOSPADM

## 2018-12-21 RX ORDER — MAGNESIUM HYDROXIDE 1200 MG/15ML
LIQUID ORAL AS NEEDED
Status: DISCONTINUED | OUTPATIENT
Start: 2018-12-21 | End: 2018-12-21 | Stop reason: HOSPADM

## 2018-12-21 RX ORDER — ONDANSETRON 2 MG/ML
4 INJECTION INTRAMUSCULAR; INTRAVENOUS ONCE AS NEEDED
Status: DISCONTINUED | OUTPATIENT
Start: 2018-12-21 | End: 2018-12-22 | Stop reason: HOSPADM

## 2018-12-21 RX ORDER — LIDOCAINE HYDROCHLORIDE 10 MG/ML
0.5 INJECTION, SOLUTION EPIDURAL; INFILTRATION; INTRACAUDAL; PERINEURAL ONCE AS NEEDED
Status: COMPLETED | OUTPATIENT
Start: 2018-12-21 | End: 2018-12-21

## 2018-12-21 RX ORDER — SODIUM CHLORIDE 0.9 % (FLUSH) 0.9 %
3 SYRINGE (ML) INJECTION EVERY 12 HOURS SCHEDULED
Status: DISCONTINUED | OUTPATIENT
Start: 2018-12-21 | End: 2018-12-21 | Stop reason: HOSPADM

## 2018-12-21 RX ORDER — PROMETHAZINE HYDROCHLORIDE 25 MG/1
25 SUPPOSITORY RECTAL ONCE AS NEEDED
Status: DISCONTINUED | OUTPATIENT
Start: 2018-12-21 | End: 2018-12-22 | Stop reason: HOSPADM

## 2018-12-21 RX ORDER — PROMETHAZINE HYDROCHLORIDE 25 MG/1
25 TABLET ORAL ONCE AS NEEDED
Status: DISCONTINUED | OUTPATIENT
Start: 2018-12-21 | End: 2018-12-22 | Stop reason: HOSPADM

## 2018-12-21 RX ORDER — FAMOTIDINE 20 MG/1
20 TABLET, FILM COATED ORAL ONCE
Status: COMPLETED | OUTPATIENT
Start: 2018-12-21 | End: 2018-12-21

## 2018-12-21 RX ORDER — BUPIVACAINE HYDROCHLORIDE AND EPINEPHRINE 2.5; 5 MG/ML; UG/ML
INJECTION, SOLUTION EPIDURAL; INFILTRATION; INTRACAUDAL; PERINEURAL AS NEEDED
Status: DISCONTINUED | OUTPATIENT
Start: 2018-12-21 | End: 2018-12-21 | Stop reason: HOSPADM

## 2018-12-21 RX ORDER — SODIUM CHLORIDE 0.9 % (FLUSH) 0.9 %
3-10 SYRINGE (ML) INJECTION AS NEEDED
Status: DISCONTINUED | OUTPATIENT
Start: 2018-12-21 | End: 2018-12-21 | Stop reason: HOSPADM

## 2018-12-21 RX ORDER — SODIUM CHLORIDE, SODIUM LACTATE, POTASSIUM CHLORIDE, CALCIUM CHLORIDE 600; 310; 30; 20 MG/100ML; MG/100ML; MG/100ML; MG/100ML
9 INJECTION, SOLUTION INTRAVENOUS CONTINUOUS
Status: DISCONTINUED | OUTPATIENT
Start: 2018-12-21 | End: 2018-12-22 | Stop reason: HOSPADM

## 2018-12-21 RX ADMIN — FAMOTIDINE 20 MG: 20 TABLET ORAL at 07:53

## 2018-12-21 RX ADMIN — SODIUM CHLORIDE, POTASSIUM CHLORIDE, SODIUM LACTATE AND CALCIUM CHLORIDE 9 ML/HR: 600; 310; 30; 20 INJECTION, SOLUTION INTRAVENOUS at 07:41

## 2018-12-21 RX ADMIN — LIDOCAINE HYDROCHLORIDE 0.2 ML: 10 INJECTION, SOLUTION EPIDURAL; INFILTRATION; INTRACAUDAL; PERINEURAL at 07:40

## 2018-12-21 RX ADMIN — SODIUM CHLORIDE, POTASSIUM CHLORIDE, SODIUM LACTATE AND CALCIUM CHLORIDE: 600; 310; 30; 20 INJECTION, SOLUTION INTRAVENOUS at 07:40

## 2018-12-21 NOTE — BRIEF OP NOTE
Brief Operative Note    Astrid Nobles    Date of Surgery: 12/21/2018    Pre-op Diagnosis:   Gastric Cancer       Post-Op Diagnosis:  Gastric cancer     Procedure(s):  Removal of Right Internal Jugular Vein Port    Surgeon(s):  Nicole Crooks MD    Anesthesia: Local    Staff:   Circulator: Jessika Nelson RN  Scrub Person: Herminio Hayward  Monitor/Sedation Nurse: Halima Herman RN  Nursing Assistant: Jocelyn Leach CNA    Estimated Blood Loss: Minimal    Urine Voided: Not measured    Complications: None      Nicole Crooks MD     Date: 12/21/2018  Time: 10:22 AM

## 2018-12-21 NOTE — H&P
Patient Care Team:      Chief complaint:  Gastric cancer s/p chemotherapy    Subjective:  Patient is a 53 y.o.female presents diagnosed with gastric cancer February 2018.  She underwent neoadjuvant chemotherapy followed by surgery followed by adjuvant chemotherapy and overall tolerated quite well.  She has been doing well and now on Survivorship Care Plan with routine cancer surveillance.  No longer in need of venous access port.  Here today for removal of port.     Review of Systems:  Constitutional: Negative for fatigue, fever and unexpected weight change.   HENT: Negative for congestion, hearing loss, sore throat and trouble swallowing.    Eyes: Negative for visual disturbance.   Respiratory: Negative for cough, shortness of breathe, or wheezing.    Cardiovascular: Negative for chest pain and leg swelling.   Gastrointestinal: Negative for abdominal distention, abdominal pain, constipation, diarrhea, nausea and vomiting.   Endocrine: Negative.    Genitourinary: Negative.    Musculoskeletal: Positive for arthralgias (bilateral knees). Negative for back pain and gait problem.   Skin: Negative.    Allergic/Immunologic: Negative.    Neurological: Negative for dizziness, weakness, numbness and headaches.       Allergies: No Known Allergies  Latex: no known allergy  Contrast Dye:  no known allergy\      Home Meds    Medications Prior to Admission   Medication Sig Dispense Refill Last Dose   • ascorbic acid (VITAMIN C) 1000 MG tablet Take 1,000 mg by mouth Daily.   12/20/2018 at 1400   • BIOTIN PO Take 1 tablet by mouth.   12/20/2018 at 0700   • omeprazole (priLOSEC) 40 MG capsule Take 1 capsule by mouth Daily. 30 capsule 6 12/20/2018 at 1800   • Prenatal Vit-Fe Fumarate-FA (PRENATAL 27-1) 27-1 MG tablet tablet Take 1 tablet by mouth Daily.   12/20/2018 at 0900   • Fluticasone-Umeclidin-Vilant 100-62.5-25 MCG/INH aerosol powder  Inhale 1 each Daily. 1 each 6      PMH:   Past Medical History:   Diagnosis Date   •  "Abdominal pain    • Acid reflux    • Arthritis    • Gastric cancer (CMS/HCC)    • Gastric mass    • Gastric ulcer    • History of transfusion     no reaction 2018   • Wears glasses      PSH:    Past Surgical History:   Procedure Laterality Date   • CERVICAL BIOPSY  W/ LOOP ELECTRODE EXCISION     • DIAGNOSTIC LAPAROSCOPY N/A 3/13/2018    Procedure: DIAGNOSTIC LAPAROSCOPY WITH BIOPSY, LYSIS OF ADHESIONS;  Surgeon: Nicole Crooks MD;  Location:  [facility];  Service: General   • ENDOSCOPY     • GASTRECTOMY N/A 6/29/2018    Procedure: SUBTOTAL GASTRECTOMY;  Surgeon: Nicole Crooks MD;  Location:  [facility];  Service: General   • STOMACH SURGERY  06/09/2018    For removal of mass   • TUBAL ABDOMINAL LIGATION     • VENOUS ACCESS DEVICE (PORT) INSERTION       Immunization History: pneumonia=no          Influenza=yes      Tetanus=unknown     Social History:  Social History     Tobacco Use   • Smoking status: Never Smoker   • Smokeless tobacco: Never Used   Substance Use Topics   • Alcohol use: Yes     Comment: occ, nothing weekly    illicit drug use=no         Physical Exam:/81 (BP Location: Right arm, Patient Position: Lying)   Pulse 70   Temp 97.5 °F (36.4 °C) (Temporal)   Resp 18   Ht 157.5 cm (62\")   Wt 76.2 kg (168 lb)   SpO2 99%   Breastfeeding? No   BMI 30.73 kg/m²       General Appearance:    Alert, cooperative, no distress, appears stated age   Head:    Normocephalic, without obvious abnormality, atraumatic   Lungs:     Clear to auscultation bilaterally, respirations unlabored    Heart:  Regular rate and rhythm, S1 and S2 normal, no murmur, rub    or gallop    Abdomen:    Soft without tenderness, non-distended, normal bowel sounds, no mass palpated.   Breast Exam:    deferred   Genitalia:    deferred   Extremities:   Extremities normal, atraumatic, no cyanosis or edema   Skin:   Skin color, texture, turgor normal, no rashes or lesions   Neurologic:   Grossly intact     Cancer Staging:   Cancer Patient: " __ yes __no __unknown; If yes, clinical stage T:__ N:__M:__, stage group    Impression: GASTRIC CANCER S/P CHEMOTHERAPY    Plan:  VENOUS INFUSION PORT REMOVAL    BRIANNA North 12/21/2018 8:22 AM     I I have reviewed the above note, my prior clinic note, appropriate imaging, and labs.  I have again discussed the risks and benefits of port removal with the patient.  All of her questions have been answered.  She understands and wishes to proceed.    Nicole Crooks MD

## 2018-12-21 NOTE — ANESTHESIA PREPROCEDURE EVALUATION
Anesthesia Evaluation     Patient summary reviewed and Nursing notes reviewed   NPO Solid Status: > 8 hours  NPO Liquid Status: > 8 hours           Airway   Mallampati: II  TM distance: >3 FB  Neck ROM: full  No difficulty expected  Dental - normal exam     Pulmonary - normal exam    breath sounds clear to auscultation  (+) pneumonia (Resolving) ,   Cardiovascular - negative cardio ROS and normal exam    Rhythm: regular  Rate: normal        Neuro/Psych- negative ROS  GI/Hepatic/Renal/Endo      ROS Comment: Gastric ca s/p resection + chemo    Musculoskeletal     Abdominal    Substance History      OB/GYN          Other   (+) arthritis   history of cancer                    Anesthesia Plan    ASA 3     MAC     intravenous induction   Anesthetic plan, all risks, benefits, and alternatives have been provided, discussed and informed consent has been obtained with: patient.    Plan discussed with CRNA.

## 2018-12-21 NOTE — OP NOTE
Operative Note    Date of Surgery:  12/21/2018    Pre-Operative Diagnosis: Gastric Cancer    Post-Operative Diagnosis: Same    Procedure:  Removal of right internal jugular vein port    Anesthesia:  Local    Surgeon:  Nicole Crooks MD    Assistant:  None    Estimated Blood Loss:  Minimal    Indication for Procedure:   Mrs. Nobles is a 53-year-old female of who was diagnosed with locally advanced gastric cancer in February 2018.  She underwent diagnostic laparoscopy with washings that were negative for metastatic disease.  She was subsequently treated with neoadjuvant FLOT by subtotal gastrectomy with D2 lymphadenectomy after which he completed adjuvant FLOT therapy with out evidence for recurrence.  She was referred to clinic for port removal.  She presents to the operating room today in this regard.  I counseled her as to the benefits and risks of the procedure, including but not limited to: Bleeding, infection, need for additional procedures or surgeries, need for replacement of the port, and misplaced port contents.  The patient understands these risks and wishes to proceed.    Procedure:   The patient was brought to the operating room after informed consent was obtained.  She was placed in the supine position with the right arm extended.  The right chest was prepped and draped in a standard sterile fashion.  A timeout was performed, indicating the patient's name and intended procedure.  Local anesthetic was injected over the port of site, and a skin incision was made overlying the device.  The skin and simultaneous tissues were dissected down to the port using electrocautery.  The port was dissected away from the surrounding fibrous tissues to identify the Prolene sutures that were cut.  The port was then removed with ease.  Inspection of the port revealed it to be intact.  The operative bed was irrigated and dried.  The incision was closed with 3-0 Vicryl deep dermal sutures followed by 4-0 Monocryl in a  subcuticular fashion.  The incision was cleansed and dried and covered with Mastisol, Steri-Strips, Telfa, Tegaderm.  Patient was transported to the recovery unit in stable condition.  All lap, sponge, needle counts were noted to be corrected of the case.    Nicole Crooks MD  12/21/18  10:25 AM

## 2019-01-14 ENCOUNTER — TELEPHONE (OUTPATIENT)
Dept: PULMONOLOGY | Facility: CLINIC | Age: 54
End: 2019-01-14

## 2019-01-14 NOTE — TELEPHONE ENCOUNTER
Patient had questions regarding an order for Stress Echo by Dr Pio Wiseman.  She is feeling a financial burden with the number of tests, labs, appt that she wanted to know if this test was necessary.  I explained the reasons Dr Wiseman the test was for possible atypical angina and to r/o CAD or structural cardiac disease.  She states that she is feeling better and her dyspnea has improved.  She would like to discuss the need for test at her next appt.  She needs to cancel her appt for the 25th and re-schedule once the office calls her with open appt times.  She has gone back to work since that appt was scheduled.

## 2019-01-15 ENCOUNTER — DOCUMENTATION (OUTPATIENT)
Dept: ONCOLOGY | Facility: CLINIC | Age: 54
End: 2019-01-15

## 2019-02-01 ENCOUNTER — OFFICE VISIT (OUTPATIENT)
Dept: PULMONOLOGY | Facility: CLINIC | Age: 54
End: 2019-02-01

## 2019-02-01 VITALS
HEIGHT: 62 IN | HEART RATE: 64 BPM | DIASTOLIC BLOOD PRESSURE: 90 MMHG | BODY MASS INDEX: 31.47 KG/M2 | TEMPERATURE: 98.2 F | WEIGHT: 171 LBS | OXYGEN SATURATION: 98 % | SYSTOLIC BLOOD PRESSURE: 140 MMHG

## 2019-02-01 DIAGNOSIS — R06.02 SHORTNESS OF BREATH: Primary | ICD-10-CM

## 2019-02-01 DIAGNOSIS — C16.2 MALIGNANT NEOPLASM OF BODY OF STOMACH (HCC): ICD-10-CM

## 2019-02-01 PROCEDURE — 99213 OFFICE O/P EST LOW 20 MIN: CPT | Performed by: NURSE PRACTITIONER

## 2019-02-01 RX ORDER — ATORVASTATIN CALCIUM 10 MG/1
10 TABLET, FILM COATED ORAL DAILY
Refills: 0 | COMMUNITY
Start: 2019-01-09 | End: 2019-07-05

## 2019-02-01 NOTE — PROGRESS NOTES
[facility] Pulmonary Follow up    CHIEF COMPLAINT    Shortness of breath    HISTORY OF PRESENT ILLNESS    Astrid Nobles is a 53 y.o.female here today for follow-up of her shortness of breath.  She was last seen in our office in November by Dr. Pio Wiseman.  Since that time she has completed chemotherapy for her stomach cancer.  She follows up with Dr. Oswald in April for a repeat CT scan.  She has noticed that since completing her chemotherapy that her shortness of breath has improved.  She still gets short of breath with activity but recovers fairly quickly at rest.    She had trialed Trelegy at her last appointment.  However she stated that she did not notice any improvement in her breathing.  She also had difficulty taking the inhaler and stated that it caused her to cough after taking it.  She discontinued the inhaler after a couple of weeks.  She denies using a rescue inhaler.    She was also having issues with nasal drainage and reflux symptoms.  She was started on an allergy tablet and omeprazole for GERD.  She states that she has not been using either of these and states that her symptoms are improved.  She had a stress echo ordered as well but was unsure why this was ordered and never had this scheduled.    She denies fever, chills, sputum production, hemoptysis, night sweats, weight loss, chest pain or palpitations.  She denies lower extremity edema.  She has noticed that her middle finger on both hands has become discolored and cool to the touch.  She is concerned as to what this may be.    Patient Active Problem List   Diagnosis   • Malignant neoplasm of body of stomach (CMS/HCC)   • Mass of stomach   • Shortness of breath       No Known Allergies    Current Outpatient Medications:   •  ascorbic acid (VITAMIN C) 1000 MG tablet, Take 1,000 mg by mouth Daily., Disp: , Rfl:   •  atorvastatin (LIPITOR) 10 MG tablet, Take 10 mg by mouth Daily., Disp: , Rfl: 0  •  BIOTIN PO, Take 1 tablet by mouth., Disp: ,  Rfl:   •  Prenatal Vit-Fe Fumarate-FA (PRENATAL 27-1) 27-1 MG tablet tablet, Take 1 tablet by mouth Daily., Disp: , Rfl:   No current facility-administered medications for this visit.     Facility-Administered Medications Ordered in Other Visits:   •  heparin flush (porcine) 100 UNIT/ML injection 500 Units, 500 Units, Intravenous, PRN, Kaden Oswald MD  •  sodium chloride 0.9 % flush 10 mL, 10 mL, Intravenous, PRN, Kaden Oswald MD  •  sodium chloride 0.9 % infusion, 100 mL/hr, Intravenous, Continuous, MartinKathy ash APRN, Last Rate: 100 mL/hr at 08/09/18 1125, 100 mL/hr at 08/09/18 1125  MEDICATION LIST AND ALLERGIES REVIEWED.    Social History     Tobacco Use   • Smoking status: Never Smoker   • Smokeless tobacco: Never Used   Substance Use Topics   • Alcohol use: Yes     Comment: occ, nothing weekly    • Drug use: No       FAMILY AND SOCIAL HISTORY REVIEWED.    Review of Systems   Constitutional: Negative for activity change, appetite change, fatigue, fever and unexpected weight change.   HENT: Negative for congestion, postnasal drip, rhinorrhea, sinus pressure, sore throat and voice change.    Eyes: Negative for visual disturbance.   Respiratory: Positive for cough and shortness of breath. Negative for chest tightness and wheezing.    Cardiovascular: Negative for chest pain, palpitations and leg swelling.   Gastrointestinal: Negative for abdominal distention, abdominal pain, nausea and vomiting.   Endocrine: Negative for cold intolerance and heat intolerance.   Genitourinary: Negative for difficulty urinating and urgency.   Musculoskeletal: Negative for arthralgias, back pain and neck pain.   Skin: Negative for color change and pallor.   Allergic/Immunologic: Negative for environmental allergies and food allergies.   Neurological: Negative for dizziness, syncope, weakness and light-headedness.   Hematological: Negative for adenopathy. Does not bruise/bleed easily.   Psychiatric/Behavioral: Negative for  "agitation and behavioral problems.   .    /90   Pulse 64   Temp 98.2 °F (36.8 °C)   Ht 157.5 cm (62\")   Wt 77.6 kg (171 lb)   SpO2 98%   BMI 31.28 kg/m²     Immunization History   Administered Date(s) Administered   • flucelvax quad pfs =>4 YRS 11/21/2018       Physical Exam   Constitutional: She is oriented to person, place, and time. She appears well-developed and well-nourished.   HENT:   Head: Normocephalic and atraumatic.   Eyes: Pupils are equal, round, and reactive to light.   Neck: Normal range of motion. Neck supple. No thyromegaly present.   Cardiovascular: Normal rate, regular rhythm, normal heart sounds and intact distal pulses. Exam reveals no gallop and no friction rub.   No murmur heard.  Pulmonary/Chest: Effort normal and breath sounds normal. No respiratory distress. She has no wheezes. She has no rales. She exhibits no tenderness.   Abdominal: Soft. Bowel sounds are normal. There is no tenderness.   Musculoskeletal: Normal range of motion.   Lymphadenopathy:     She has no cervical adenopathy.   Neurological: She is alert and oriented to person, place, and time.   Skin: Skin is warm and dry. Capillary refill takes less than 2 seconds. She is not diaphoretic.   Psychiatric: She has a normal mood and affect. Her behavior is normal.   Nursing note and vitals reviewed.        RESULTS      PROBLEM LIST    Problem List Items Addressed This Visit        Respiratory    Shortness of breath - Primary       Digestive    Malignant neoplasm of body of stomach (CMS/HCC)            DISCUSSION    Mrs. Nobles was here for follow-up of her shortness of breath.  She states that most of her symptoms have improved and will occasionally have shortness of breath with activity.  She states since completing her chemotherapy most of her symptoms have resolved.  She denies any allergy problems or reflux problems currently.  I advised her that if she noticed some reflux symptoms that she could start taking the " omeprazole again.  We also discussed reflux precautions in the office today.    She will continue follow-up with Dr. Oswald in April and a repeat CT scan at that time.  She will follow up with GI at that time as well with a repeat scope.    She will follow-up as needed in our office and will call if she has worsening symptoms.  I spent 15 minutes with the patient. I spent > 50% percent of this time counseling and discussing diagnosis, prognosis, diagnostic testing, evaluation, current status, treatment options and management.    Yulia Rodrigues, MEHNAZ  02/01/20193:06 PM  Electronically signed     Please note that portions of this note were completed with a voice recognition program. Efforts were made to edit the dictations, but occasionally words are mistranscribed.      CC: Provider, No Known

## 2019-04-19 ENCOUNTER — HOSPITAL ENCOUNTER (OUTPATIENT)
Dept: CT IMAGING | Facility: HOSPITAL | Age: 54
Discharge: HOME | End: 2019-04-19
Admitting: NURSE PRACTITIONER

## 2019-04-19 DIAGNOSIS — C16.2 MALIGNANT NEOPLASM OF BODY OF STOMACH (HCC): ICD-10-CM

## 2019-04-19 LAB — CREAT BLDA-MCNC: 0.8 MG/DL (ref 0.6–1.3)

## 2019-04-19 PROCEDURE — 82565 ASSAY OF CREATININE: CPT

## 2019-04-19 PROCEDURE — 25010000002 IOPAMIDOL 61 % SOLUTION: Performed by: NURSE PRACTITIONER

## 2019-04-19 PROCEDURE — 71260 CT THORAX DX C+: CPT

## 2019-04-19 PROCEDURE — 74177 CT ABD & PELVIS W/CONTRAST: CPT

## 2019-04-19 RX ADMIN — BARIUM SULFATE 450 ML: 21 SUSPENSION ORAL at 13:15

## 2019-04-19 RX ADMIN — IOPAMIDOL 95 ML: 612 INJECTION, SOLUTION INTRAVENOUS at 14:28

## 2019-04-26 ENCOUNTER — OFFICE VISIT (OUTPATIENT)
Dept: ONCOLOGY | Facility: CLINIC | Age: 54
End: 2019-04-26

## 2019-04-26 ENCOUNTER — LAB (OUTPATIENT)
Dept: LAB | Facility: HOSPITAL | Age: 54
End: 2019-04-26

## 2019-04-26 VITALS
RESPIRATION RATE: 16 BRPM | HEART RATE: 72 BPM | TEMPERATURE: 98 F | SYSTOLIC BLOOD PRESSURE: 140 MMHG | WEIGHT: 172 LBS | DIASTOLIC BLOOD PRESSURE: 72 MMHG | OXYGEN SATURATION: 94 % | BODY MASS INDEX: 31.65 KG/M2 | HEIGHT: 62 IN

## 2019-04-26 DIAGNOSIS — C16.2 MALIGNANT NEOPLASM OF BODY OF STOMACH (HCC): Primary | Chronic | ICD-10-CM

## 2019-04-26 DIAGNOSIS — C16.2 MALIGNANT NEOPLASM OF BODY OF STOMACH (HCC): ICD-10-CM

## 2019-04-26 LAB
ALBUMIN SERPL-MCNC: 4.1 G/DL (ref 3.5–5.2)
ALBUMIN/GLOB SERPL: 1.2 G/DL
ALP SERPL-CCNC: 109 U/L (ref 39–117)
ALT SERPL W P-5'-P-CCNC: 17 U/L (ref 1–33)
ANION GAP SERPL CALCULATED.3IONS-SCNC: 13 MMOL/L
AST SERPL-CCNC: 20 U/L (ref 1–32)
BILIRUB SERPL-MCNC: 0.3 MG/DL (ref 0.2–1.2)
BUN BLD-MCNC: 14 MG/DL (ref 6–20)
BUN/CREAT SERPL: 17.3 (ref 7–25)
CALCIUM SPEC-SCNC: 9.3 MG/DL (ref 8.6–10.5)
CHLORIDE SERPL-SCNC: 103 MMOL/L (ref 98–107)
CO2 SERPL-SCNC: 26 MMOL/L (ref 22–29)
CREAT BLD-MCNC: 0.81 MG/DL (ref 0.57–1)
ERYTHROCYTE [DISTWIDTH] IN BLOOD BY AUTOMATED COUNT: 17.4 % (ref 12.3–15.4)
GFR SERPL CREATININE-BSD FRML MDRD: 89 ML/MIN/1.73
GLOBULIN UR ELPH-MCNC: 3.3 GM/DL
GLUCOSE BLD-MCNC: 109 MG/DL (ref 65–99)
HCT VFR BLD AUTO: 37.8 % (ref 34–46.6)
HGB BLD-MCNC: 12.5 G/DL (ref 12–15.9)
LYMPHOCYTES # BLD AUTO: 1.8 10*3/MM3 (ref 0.7–3.1)
LYMPHOCYTES NFR BLD AUTO: 37.3 % (ref 19.6–45.3)
MCH RBC QN AUTO: 26.4 PG (ref 26.6–33)
MCHC RBC AUTO-ENTMCNC: 33.1 G/DL (ref 31.5–35.7)
MCV RBC AUTO: 79.9 FL (ref 79–97)
MONOCYTES # BLD AUTO: 0.4 10*3/MM3 (ref 0.1–0.9)
MONOCYTES NFR BLD AUTO: 8.3 % (ref 5–12)
NEUTROPHILS # BLD AUTO: 2.7 10*3/MM3 (ref 1.7–7)
NEUTROPHILS NFR BLD AUTO: 54.4 % (ref 42.7–76)
PLATELET # BLD AUTO: 227 10*3/MM3 (ref 140–450)
PMV BLD AUTO: 8.3 FL (ref 6–12)
POTASSIUM BLD-SCNC: 4.2 MMOL/L (ref 3.5–5.2)
PROT SERPL-MCNC: 7.4 G/DL (ref 6–8.5)
RBC # BLD AUTO: 4.73 10*6/MM3 (ref 3.77–5.28)
SODIUM BLD-SCNC: 142 MMOL/L (ref 136–145)
WBC NRBC COR # BLD: 4.9 10*3/MM3 (ref 3.4–10.8)

## 2019-04-26 PROCEDURE — 99214 OFFICE O/P EST MOD 30 MIN: CPT | Performed by: INTERNAL MEDICINE

## 2019-04-26 PROCEDURE — 80053 COMPREHEN METABOLIC PANEL: CPT

## 2019-04-26 PROCEDURE — 85025 COMPLETE CBC W/AUTO DIFF WBC: CPT

## 2019-04-26 PROCEDURE — 36415 COLL VENOUS BLD VENIPUNCTURE: CPT

## 2019-04-26 NOTE — PROGRESS NOTES
CHIEF COMPLAINT: Follow-up gastric carcinoma    Problem List:     Malignant neoplasm of body of stomach (CMS/HCC)    2/6/2018 Initial Diagnosis     Gastric cancer.  53-year-old female with a three-month history of indigestion and upper abdominal pain.  She was started on omeprazole for presumed reflux, symptoms were not relieved.  She underwent an EGD on 2/6/2018 with Dr. Matt that revealed a large, friable and ulcerated mass in the body of the stomach.  Biopsies returned moderately to poorly differentiated adenocarcinoma.  Staging studies including CT scan of the chest abdomen and pelvis were negative for distant disease.  She underwent diagnostic laparoscopic with peritoneal washings to complete staging on 3/13/2018.  Baseline CBC 3/9/2018 WBC 4820, hemoglobin 8.5, hematocrit 28.9%, platelet count 351,000, MCV 64.8, MCH 19.1, MCHC 29.4.  CEA normal at less than 0.50.  HER-2/erik testing from gastric body biopsy was negative.  CMP was normal other than for serum calcium slightly decreased at 8.6.           2/6/2018 Procedure     EGD revealed gastric ulcerated mass in body of stomach.  Pathology returned:   Final Diagnosis    1. GASTRIC BODY BIOPSY:  Invasive moderate to poorly differentiated adenocarcinoma with ulceration (see comment).   2. GASTRIC ANTRUM BIOPSY:  Minimal chronic gastritis without activity.  No intestinal metaplasia or dysplasia identified.  No Helicobacter pylori-like organisms identified on routine stains.   HER2/erik testing negative  FLUORESCENCE IN-SITU HYBRIDIZATION (FISH) REPORT:  Negative/Not Amplified  HER2/WILL-17 Ratio: 1.3           2/9/2018 Imaging     CT abdomen/pelvis IMPRESSION:     There is mild thickening and prominence of the mid gastric wall of the  body of the stomach, middle third.     Extragastric mass is not identified. The lesser sac and retrogastric  spaces are clear.     Visceral tumor, adenopathy, stranding or caking is not currently  identified as described. There is  "no retroperitoneal or retrogastric  adenopathy. The lower lung zones are clear with no evidence of reactive  pleural effusion or occult mass. There is no liver metastasis and no  other acute focal CT abnormality is identified within the abdomen or  pelvis.     Incidental note is made of a nonobstructing stone right kidney right  lower pole.         3/9/2018 Imaging     CT Chest IMPRESSION:  No acute intrathoracic abnormality. No definite evidence of  metastatic disease.         3/13/2018 Procedure     Diagnostic laproscopy with biopsy and lysis of adhesions.  Pathology returned:  Final Diagnosis   PERITONEAL NODULE, EXCISIONAL BIOPSY:  Benign fibrotic nodule. (See comment)  JFJ/[id number]    Electronically signed by Boaz Jerez MD on 3/15/2018 at 1624   Comment See result below    The biopsy shows a lobulated fibrotic nodule with some intermixed lymphoid cells. The nodule does not appear neoplastic. One cannot entirely exclude a \"burned out\" lymph node, or other implant. There is no evidence of carcinoma.      Abdominal wall washings benign:  Final Diagnosis    1. ABDOMINAL WASH, WALL:  Negative for malignancy.  Scant cellularity; chronic inflammatory cells and scattered benign mesothelial cells.   2. ABDOMINAL WASH, WALL:  Negative for malignancy.  Scant cellularity; chronic inflammatory cells and scattered benign mesothelial cells.   3. ABDOMINAL WASH, WALL:  Negative for malignancy.  Benign mesothelial cells and mixed inflammatory cells.                4/5/2018 - 9/27/2018 Chemotherapy     OP GASTRIC FLOT OXALIplatin / Leucovorin / Fluorouracil/Taxotere  x8         5/9/2018 Genetic Testing     Genetic testing negative for cancer next panel         5/17/2018 Imaging     CT chest abdomen and pelvis showed Shrinkage of the gastric tumor with no evidence of metastasis         6/29/2018 Surgery     Surgery       Procedure:  Subtotal gastrectomy showing 0.7 cm 2 out of 10 node positive moderately differentiated " adenocarcinoma of the stomach.  YpT2 N1 M0 stage IIa         10/29/2018 Imaging     CT chest abdomen pelvis with contrast shows no evidence of metastasis or recurrence but does have bilateral basilar pulmonary infiltrates.  Was started on Levaquin 10/19/18.         4/19/2019 Imaging     CT chest, abdomen and pelvis IMPRESSION:  1. Considerable atelectasis at the lung bases and/or scarring similar to  prior without focal consolidation or effusion.  2. Stable appearance of the abdomen and pelvis without evidence for  recurrent disease; specifically, no new soft tissue nodular enhancement  with stable appearance of the surgical margin subtotal gastrectomy and  patulous appearance of the distal esophagus similar to prior. No new  peritoneal reticulation or fluid.            HISTORY OF PRESENT ILLNESS:  The patient is a 54 y.o. female, here for follow up on management of follow-up gastric carcinoma.  No evidence of disease clinically.  Status post CTs.  I reviewed images and reports there.      Past Medical History:   Diagnosis Date   • Abdominal pain    • Acid reflux    • Arthritis    • Gastric cancer (CMS/HCC)    • Gastric mass    • Gastric ulcer    • History of transfusion     no reaction 2018   • Wears glasses      Past Surgical History:   Procedure Laterality Date   • CERVICAL BIOPSY  W/ LOOP ELECTRODE EXCISION     • DIAGNOSTIC LAPAROSCOPY N/A 3/13/2018    Procedure: DIAGNOSTIC LAPAROSCOPY WITH BIOPSY, LYSIS OF ADHESIONS;  Surgeon: Nicole Crooks MD;  Location:  [facility];  Service: General   • ENDOSCOPY     • GASTRECTOMY N/A 6/29/2018    Procedure: SUBTOTAL GASTRECTOMY;  Surgeon: Nicole Crooks MD;  Location:  [facility];  Service: General   • STOMACH SURGERY  06/09/2018    For removal of mass   • TUBAL ABDOMINAL LIGATION     • VENOUS ACCESS DEVICE (PORT) INSERTION     • VENOUS ACCESS DEVICE (PORT) REMOVAL N/A 12/21/2018    Procedure: PORT REMOVAL;  Surgeon: Nicole Crooks MD;  Location:  [facility];  Service: General  "      No Known Allergies    Family History and Social History reviewed and changed as necessary      REVIEW OF SYSTEM:   Review of Systems   Constitutional: Negative for appetite change, chills, diaphoresis, fatigue, fever and unexpected weight change.   HENT:   Negative for mouth sores, sore throat and trouble swallowing.    Eyes: Negative for icterus.   Respiratory: Negative for cough, hemoptysis and shortness of breath.    Cardiovascular: Negative for chest pain, leg swelling and palpitations.   Gastrointestinal: Negative for abdominal distention, abdominal pain, blood in stool, constipation, diarrhea, nausea and vomiting.   Endocrine: Negative for hot flashes.   Genitourinary: Negative for bladder incontinence, difficulty urinating, dysuria, frequency and hematuria.    Musculoskeletal: Negative for gait problem, neck pain and neck stiffness.   Skin: Negative for rash.   Neurological: Negative for dizziness, gait problem, headaches, light-headedness and numbness.   Hematological: Negative for adenopathy. Does not bruise/bleed easily.   Psychiatric/Behavioral: Negative for depression. The patient is not nervous/anxious.    All other systems reviewed and are negative.       PHYSICAL EXAM    Vitals:    04/26/19 1017   BP: 140/72   Pulse: 72   Resp: 16   Temp: 98 °F (36.7 °C)   SpO2: 94%   Weight: 78 kg (172 lb)   Height: 157.5 cm (62\")     Constitutional: Appears well-developed and well-nourished. No distress.   ECOG: (0) Fully active, able to carry on all predisease performance without restriction  HENT:   Head: Normocephalic.   Mouth/Throat: Oropharynx is clear and moist.   Eyes: Conjunctivae are normal. Pupils are equal, round, and reactive to light. No scleral icterus.   Neck: Neck supple. No JVD present. No thyromegaly present.   Cardiovascular: Normal rate, regular rhythm and normal heart sounds.    Pulmonary/Chest: Breath sounds normal. No respiratory distress.   Abdominal: Soft. Exhibits no distension and no " mass. There is no hepatosplenomegaly. There is no tenderness. There is no rebound and no guarding.   Musculoskeletal:Exhibits no edema, tenderness or deformity.   Neurological: Alert and oriented to person, place, and time. Exhibits normal muscle tone.   Skin: No ecchymosis, no petechiae and no rash noted. Not diaphoretic. No cyanosis. Nails show no clubbing.   Psychiatric: Normal mood and affect.   Vitals reviewed.      Lab Results   Component Value Date    HGB 12.5 04/26/2019    HCT 37.8 04/26/2019    MCV 79.9 04/26/2019     04/26/2019    WBC 4.90 04/26/2019    NEUTROABS 2.70 04/26/2019    LYMPHSABS 1.80 04/26/2019    MONOSABS 0.40 04/26/2019    EOSABS 0.00 06/30/2018    BASOSABS 0.01 06/30/2018       Lab Results   Component Value Date    GLUCOSE 92 12/17/2018    BUN 13 12/17/2018    CREATININE 0.80 04/19/2019     12/17/2018    K 4.3 12/17/2018     12/17/2018    CO2 24.0 12/17/2018    CALCIUM 9.4 12/17/2018    PROTEINTOT 6.4 12/17/2018    ALBUMIN 4.33 12/17/2018    BILITOT 0.4 12/17/2018    ALKPHOS 81 12/17/2018    AST 22 12/17/2018    ALT 20 12/17/2018                   ASSESSMENT & PLAN:    1. Gastric carcinoma: No evidence of disease on current CTs.  Per NCCN guidelines, we will get CT chest abdomen pelvis every 6 months until June 2020 and then annually out to June 2023.  No hint of B12 or iron deficiency thus far and we will watch for such with serial CBC.  Otherwise would get CBC and chemistry profile as clinically indicated.  Given her subtotal gastrectomy, would ask her to follow-up with Dr. Matt for post subtotal gastrectomy surveillance especially in light of chronic reflux symptoms.  Saw Dr. Nguyen in January who will see her on an as-needed basis.      Kaden Oswald MD    04/26/2019

## 2019-05-03 ENCOUNTER — TELEPHONE (OUTPATIENT)
Dept: ONCOLOGY | Facility: CLINIC | Age: 54
End: 2019-05-03

## 2019-05-03 NOTE — TELEPHONE ENCOUNTER
----- Message from Bonnie SABRINA Joiner sent at 5/3/2019 12:13 PM EDT -----  Regarding: FAMILIA-DRY SPOTS  Contact: [phone]  Patient called and she saw Dr. Oswald a week ago, but forgot to ask she is getting dry spots on her left calf what can she put on this? Please call.

## 2019-05-31 ENCOUNTER — TRANSCRIBE ORDERS (OUTPATIENT)
Dept: ADMINISTRATIVE | Facility: HOSPITAL | Age: 54
End: 2019-05-31

## 2019-05-31 DIAGNOSIS — Z12.31 VISIT FOR SCREENING MAMMOGRAM: Primary | ICD-10-CM

## 2019-07-05 ENCOUNTER — OFFICE VISIT (OUTPATIENT)
Dept: FAMILY MEDICINE CLINIC | Facility: CLINIC | Age: 54
End: 2019-07-05

## 2019-07-05 VITALS
RESPIRATION RATE: 20 BRPM | SYSTOLIC BLOOD PRESSURE: 128 MMHG | DIASTOLIC BLOOD PRESSURE: 88 MMHG | WEIGHT: 171 LBS | HEIGHT: 62 IN | BODY MASS INDEX: 31.47 KG/M2 | OXYGEN SATURATION: 98 % | TEMPERATURE: 97 F | HEART RATE: 60 BPM

## 2019-07-05 DIAGNOSIS — Z11.59 NEED FOR HEPATITIS C SCREENING TEST: ICD-10-CM

## 2019-07-05 DIAGNOSIS — Z00.00 HEALTHCARE MAINTENANCE: Primary | ICD-10-CM

## 2019-07-05 DIAGNOSIS — M62.838 CERVICAL PARASPINOUS MUSCLE SPASM: ICD-10-CM

## 2019-07-05 DIAGNOSIS — Z23 NEED FOR TETANUS BOOSTER: ICD-10-CM

## 2019-07-05 DIAGNOSIS — M54.2 NECK PAIN: ICD-10-CM

## 2019-07-05 DIAGNOSIS — Z13.220 LIPID SCREENING: ICD-10-CM

## 2019-07-05 PROBLEM — M25.519 ARTHRALGIA OF SHOULDER: Status: ACTIVE | Noted: 2019-07-05

## 2019-07-05 LAB
ALBUMIN SERPL-MCNC: 4.2 G/DL (ref 3.5–5.2)
ALBUMIN/GLOB SERPL: 1.3 G/DL
ALP SERPL-CCNC: 113 U/L (ref 39–117)
ALT SERPL W P-5'-P-CCNC: 15 U/L (ref 1–33)
ANION GAP SERPL CALCULATED.3IONS-SCNC: 13.4 MMOL/L (ref 5–15)
AST SERPL-CCNC: 22 U/L (ref 1–32)
BILIRUB BLD-MCNC: NEGATIVE MG/DL
BILIRUB SERPL-MCNC: 0.4 MG/DL (ref 0.2–1.2)
BUN BLD-MCNC: 13 MG/DL (ref 6–20)
BUN/CREAT SERPL: 16.7 (ref 7–25)
CALCIUM SPEC-SCNC: 9.6 MG/DL (ref 8.6–10.5)
CHLORIDE SERPL-SCNC: 103 MMOL/L (ref 98–107)
CHOLEST SERPL-MCNC: 215 MG/DL (ref 0–200)
CLARITY, POC: CLEAR
CO2 SERPL-SCNC: 25.6 MMOL/L (ref 22–29)
COLOR UR: YELLOW
CREAT BLD-MCNC: 0.78 MG/DL (ref 0.57–1)
EXPIRATION DATE: NORMAL
GFR SERPL CREATININE-BSD FRML MDRD: 93 ML/MIN/1.73
GLOBULIN UR ELPH-MCNC: 3.2 GM/DL
GLUCOSE BLD-MCNC: 80 MG/DL (ref 65–99)
GLUCOSE UR STRIP-MCNC: NEGATIVE MG/DL
HBA1C MFR BLD: 6 % (ref 4.8–5.6)
HDLC SERPL QL: 2.62
HDLC SERPL-MCNC: 82 MG/DL (ref 40–60)
KETONES UR QL: NEGATIVE
LDLC SERPL CALC-MCNC: 119 MG/DL (ref 0–100)
LEUKOCYTE EST, POC: NEGATIVE
Lab: NORMAL
NITRITE UR-MCNC: NEGATIVE MG/ML
PH UR: 6 [PH] (ref 5–8)
POTASSIUM BLD-SCNC: 4.1 MMOL/L (ref 3.5–5.2)
PROT SERPL-MCNC: 7.4 G/DL (ref 6–8.5)
PROT UR STRIP-MCNC: NEGATIVE MG/DL
RBC # UR STRIP: NEGATIVE /UL
SODIUM BLD-SCNC: 142 MMOL/L (ref 136–145)
SP GR UR: 1.02 (ref 1–1.03)
TRIGL SERPL-MCNC: 71 MG/DL (ref 0–150)
TSH SERPL DL<=0.05 MIU/L-ACNC: 4.24 MIU/ML (ref 0.27–4.2)
UROBILINOGEN UR QL: NORMAL
VLDLC SERPL-MCNC: 14.2 MG/DL (ref 5–40)

## 2019-07-05 PROCEDURE — 86803 HEPATITIS C AB TEST: CPT | Performed by: NURSE PRACTITIONER

## 2019-07-05 PROCEDURE — 81003 URINALYSIS AUTO W/O SCOPE: CPT | Performed by: NURSE PRACTITIONER

## 2019-07-05 PROCEDURE — 83036 HEMOGLOBIN GLYCOSYLATED A1C: CPT | Performed by: NURSE PRACTITIONER

## 2019-07-05 PROCEDURE — 82652 VIT D 1 25-DIHYDROXY: CPT | Performed by: NURSE PRACTITIONER

## 2019-07-05 PROCEDURE — 84443 ASSAY THYROID STIM HORMONE: CPT | Performed by: NURSE PRACTITIONER

## 2019-07-05 PROCEDURE — 80061 LIPID PANEL: CPT | Performed by: NURSE PRACTITIONER

## 2019-07-05 PROCEDURE — 99213 OFFICE O/P EST LOW 20 MIN: CPT | Performed by: NURSE PRACTITIONER

## 2019-07-05 PROCEDURE — 90715 TDAP VACCINE 7 YRS/> IM: CPT | Performed by: NURSE PRACTITIONER

## 2019-07-05 PROCEDURE — 80053 COMPREHEN METABOLIC PANEL: CPT | Performed by: NURSE PRACTITIONER

## 2019-07-05 PROCEDURE — 90471 IMMUNIZATION ADMIN: CPT | Performed by: NURSE PRACTITIONER

## 2019-07-05 PROCEDURE — 82607 VITAMIN B-12: CPT | Performed by: NURSE PRACTITIONER

## 2019-07-05 RX ORDER — TIZANIDINE 4 MG/1
TABLET ORAL
Qty: 60 TABLET | Refills: 1 | Status: SHIPPED | OUTPATIENT
Start: 2019-07-05 | End: 2021-05-20

## 2019-07-05 NOTE — PROGRESS NOTES
"Astrid Nobles is a 54 y.o. female who presents today to establish care and have laboratory studies performed.    Chief Complaint   Patient presents with   • Establish Care     get complete labs       Patient is here today to establish care and for routine healthcare maintenance. She had previously been a patient of Dr. Isaacs but had not seen him in about one year. She has recently been battling stomach cancer without metastatic disease and sees Dr. Oswald for oncology. She is having right shoulder pain with pain in her right upper back and into her right arm. She has had this for \"quite a while\". She is feeling good today and has no new complaints. She would like to have her routine lab work and general healthcare maintenance visit.        The following portions of the patient's history were reviewed and updated as appropriate: allergies, current medications, past family history, past medical history, past social history, past surgical history and problem list.     Past Medical History:   Diagnosis Date   • Abdominal pain    • Acid reflux    • Arthritis    • Gastric cancer (CMS/HCC)    • Gastric mass    • Gastric ulcer    • History of transfusion     no reaction 2018   • Wears glasses        Past Surgical History:   Procedure Laterality Date   • CERVICAL BIOPSY  W/ LOOP ELECTRODE EXCISION     • DIAGNOSTIC LAPAROSCOPY N/A 3/13/2018    Procedure: DIAGNOSTIC LAPAROSCOPY WITH BIOPSY, LYSIS OF ADHESIONS;  Surgeon: Nicole Crooks MD;  Location:  [facility];  Service: General   • ENDOSCOPY     • GASTRECTOMY N/A 6/29/2018    Procedure: SUBTOTAL GASTRECTOMY;  Surgeon: Nicole Crooks MD;  Location:  [facility];  Service: General   • STOMACH SURGERY  06/09/2018    For removal of mass   • TUBAL ABDOMINAL LIGATION     • VENOUS ACCESS DEVICE (PORT) INSERTION     • VENOUS ACCESS DEVICE (PORT) REMOVAL N/A 12/21/2018    Procedure: PORT REMOVAL;  Surgeon: Nicole Crooks MD;  Location:  [facility];  Service: General       Family " History   Problem Relation Age of Onset   • Hypertension Mother    • Stomach cancer Sister    • No Known Problems Brother    • No Known Problems Daughter    • No Known Problems Son    • No Known Problems Maternal Grandmother    • No Known Problems Paternal Grandmother    • Breast cancer Maternal Aunt    • No Known Problems Paternal Aunt    • Ovarian cancer Other        Social History     Socioeconomic History   • Marital status: Single     Spouse name: Not on file   • Number of children: Not on file   • Years of education: Not on file   • Highest education level: Not on file   Tobacco Use   • Smoking status: Never Smoker   • Smokeless tobacco: Never Used   Substance and Sexual Activity   • Alcohol use: Yes     Comment: occ, nothing weekly    • Drug use: No   • Sexual activity: Defer       No Known Allergies      Current Outpatient Medications:   •  ascorbic acid (VITAMIN C) 1000 MG tablet, Take 1,000 mg by mouth Daily., Disp: , Rfl:   •  BIOTIN PO, Take 1 tablet by mouth., Disp: , Rfl:   •  Prenatal Vit-Fe Fumarate-FA (PRENATAL 27-1) 27-1 MG tablet tablet, Take 1 tablet by mouth Daily., Disp: , Rfl:   •  Crisaborole 2 % ointment, Apply 1 application topically 2 (Two) Times a Day., Disp: 60 g, Rfl: 2  •  metroNIDAZOLE (METROGEL) 0.75 % vaginal gel, , Disp: , Rfl: 0  •  omega-3 acid ethyl esters (LOVAZA) 1 g capsule, Take 2 capsules by mouth Daily., Disp: 60 capsule, Rfl: 11  •  tiZANidine (ZANAFLEX) 4 MG tablet, 1 tablet qhs - may take 1/2 tab q8 during the day., Disp: 60 tablet, Rfl: 1  No current facility-administered medications for this visit.     Facility-Administered Medications Ordered in Other Visits:   •  heparin flush (porcine) 100 UNIT/ML injection 500 Units, 500 Units, Intravenous, PRN, Kaden Oswald MD  •  sodium chloride 0.9 % flush 10 mL, 10 mL, Intravenous, PRN, Kaden Oswald MD  •  sodium chloride 0.9 % infusion, 100 mL/hr, Intravenous, Continuous, Kathy Salazar APRN, Last Rate: 100 mL/hr at  "08/09/18 1125, 100 mL/hr at 08/09/18 1125    Health Maintenance   Topic Date Due   • ZOSTER VACCINE (1 of 2) 02/15/2015   • COLONOSCOPY  04/29/2016   • INFLUENZA VACCINE  08/01/2019   • PAP SMEAR  08/23/2019   • MAMMOGRAM  12/26/2019   • LIPID PANEL  07/05/2020   • ANNUAL PHYSICAL  07/06/2020   • TDAP/TD VACCINES (2 - Td) 07/05/2029   • HEPATITIS C SCREENING  Completed        ROS    Review of Systems   Constitutional: Positive for fatigue.   Endocrine: Positive for cold intolerance.   Musculoskeletal: Positive for arthralgias, back pain, myalgias, neck pain and neck stiffness.   Skin: Positive for skin lesions.   Allergic/Immunologic: Positive for environmental allergies.   Neurological: Positive for light-headedness, numbness and memory problem.   All other systems reviewed and are negative.      Visit Vitals  /88   Pulse 60   Temp 97 °F (36.1 °C)   Resp 20   Ht 157.5 cm (62\")   Wt 77.6 kg (171 lb)   SpO2 98%   BMI 31.28 kg/m²       Physical Exam     Physical Exam   Constitutional: She is oriented to person, place, and time. She appears well-developed and well-nourished.   HENT:   Head: Normocephalic and atraumatic.   Eyes: Conjunctivae and EOM are normal. Pupils are equal, round, and reactive to light.   Neck: Normal range of motion. Neck supple. No JVD present. No tracheal deviation present. No thyromegaly present.   Cardiovascular: Normal rate, regular rhythm and normal heart sounds. Exam reveals no gallop and no friction rub.   No murmur heard.  Pulmonary/Chest: Effort normal and breath sounds normal. No stridor. No respiratory distress. She has no wheezes. She has no rales.   Abdominal: Soft. Bowel sounds are normal. She exhibits no distension and no mass. There is no tenderness. There is no rebound and no guarding.   Musculoskeletal:        Right shoulder: She exhibits decreased range of motion, tenderness, pain, spasm and decreased strength.        Cervical back: She exhibits pain and spasm. "   Lymphadenopathy:     She has no cervical adenopathy.   Neurological: She is alert and oriented to person, place, and time.   Skin: Skin is warm and dry.   Psychiatric: She has a normal mood and affect. Her behavior is normal. Judgment and thought content normal.   Nursing note and vitals reviewed.      Assessment/Plan    Problem List Items Addressed This Visit     None      Visit Diagnoses     Healthcare maintenance    -  Primary    Relevant Orders    Comprehensive Metabolic Panel (Completed)    TSH (Completed)    Hemoglobin A1c (Completed)    Lipid Panel With / Chol / HDL Ratio (Completed)    Vitamin D 1,25 Dihydroxy (Completed)    Vitamin B12 (Completed)    POC Urinalysis Dipstick, Automated (Completed)    Hepatitis C Antibody (Completed)    Tdap Vaccine Greater Than or Equal To 6yo IM (Completed)    CBC & Differential    Lipid screening        Relevant Orders    Lipid Panel With / Chol / HDL Ratio (Completed)    Need for hepatitis C screening test        Relevant Orders    Hepatitis C Antibody (Completed)    Need for tetanus booster        Relevant Orders    Tdap Vaccine Greater Than or Equal To 6yo IM (Completed)    Cervical paraspinous muscle spasm        Relevant Medications    tiZANidine (ZANAFLEX) 4 MG tablet    Neck pain        Relevant Medications    tiZANidine (ZANAFLEX) 4 MG tablet          Patient will return in 6 months for recheck.    Kimberley Cooper, MEHNAZ

## 2019-07-06 LAB
HCV AB SER DONR QL: NORMAL
VIT B12 BLD-MCNC: 537 PG/ML (ref 211–946)

## 2019-07-08 ENCOUNTER — TELEPHONE (OUTPATIENT)
Dept: FAMILY MEDICINE CLINIC | Facility: CLINIC | Age: 54
End: 2019-07-08

## 2019-07-08 ENCOUNTER — OUTSIDE FACILITY SERVICE (OUTPATIENT)
Dept: GASTROENTEROLOGY | Facility: CLINIC | Age: 54
End: 2019-07-08

## 2019-07-08 ENCOUNTER — LAB REQUISITION (OUTPATIENT)
Dept: LAB | Facility: HOSPITAL | Age: 54
End: 2019-07-08

## 2019-07-08 DIAGNOSIS — Z85.028 PERSONAL HISTORY OF OTHER MALIGNANT NEOPLASM OF STOMACH: ICD-10-CM

## 2019-07-08 LAB — 1,25(OH)2D3 SERPL-MCNC: 62.1 PG/ML (ref 19.9–79.3)

## 2019-07-08 PROCEDURE — 88305 TISSUE EXAM BY PATHOLOGIST: CPT | Performed by: INTERNAL MEDICINE

## 2019-07-08 PROCEDURE — 43239 EGD BIOPSY SINGLE/MULTIPLE: CPT | Performed by: INTERNAL MEDICINE

## 2019-07-08 PROCEDURE — 88342 IMHCHEM/IMCYTCHM 1ST ANTB: CPT | Performed by: INTERNAL MEDICINE

## 2019-07-08 NOTE — TELEPHONE ENCOUNTER
Contacted pt and advised she needs to come back in to have blood drawn again due to cbc hemolyzing before being ran. Pt voiced understanding.

## 2019-07-10 LAB
CYTO UR: NORMAL
LAB AP CASE REPORT: NORMAL
LAB AP CLINICAL INFORMATION: NORMAL
PATH REPORT.FINAL DX SPEC: NORMAL
PATH REPORT.GROSS SPEC: NORMAL

## 2019-07-11 ENCOUNTER — TELEPHONE (OUTPATIENT)
Dept: GASTROENTEROLOGY | Facility: CLINIC | Age: 54
End: 2019-07-11

## 2019-07-31 PROBLEM — E78.2 MIXED HYPERLIPIDEMIA: Status: ACTIVE | Noted: 2019-07-31

## 2019-07-31 PROBLEM — E03.9 ACQUIRED HYPOTHYROIDISM: Status: ACTIVE | Noted: 2019-07-31

## 2019-07-31 PROBLEM — R73.03 PREDIABETES: Status: ACTIVE | Noted: 2019-07-31

## 2019-08-01 ENCOUNTER — OFFICE VISIT (OUTPATIENT)
Dept: FAMILY MEDICINE CLINIC | Facility: CLINIC | Age: 54
End: 2019-08-01

## 2019-08-01 VITALS
DIASTOLIC BLOOD PRESSURE: 70 MMHG | HEIGHT: 62 IN | OXYGEN SATURATION: 98 % | SYSTOLIC BLOOD PRESSURE: 110 MMHG | WEIGHT: 168.4 LBS | BODY MASS INDEX: 30.99 KG/M2 | RESPIRATION RATE: 18 BRPM | TEMPERATURE: 96.8 F | HEART RATE: 70 BPM

## 2019-08-01 DIAGNOSIS — E78.2 MIXED HYPERLIPIDEMIA: ICD-10-CM

## 2019-08-01 DIAGNOSIS — L30.9 ECZEMA, UNSPECIFIED TYPE: Primary | ICD-10-CM

## 2019-08-01 DIAGNOSIS — R79.89 ELEVATED TSH: ICD-10-CM

## 2019-08-01 PROCEDURE — 99213 OFFICE O/P EST LOW 20 MIN: CPT | Performed by: NURSE PRACTITIONER

## 2019-08-01 PROCEDURE — 84439 ASSAY OF FREE THYROXINE: CPT | Performed by: NURSE PRACTITIONER

## 2019-08-01 PROCEDURE — 36415 COLL VENOUS BLD VENIPUNCTURE: CPT | Performed by: NURSE PRACTITIONER

## 2019-08-01 PROCEDURE — 84443 ASSAY THYROID STIM HORMONE: CPT | Performed by: NURSE PRACTITIONER

## 2019-08-01 RX ORDER — OMEGA-3-ACID ETHYL ESTERS 1 G/1
2 CAPSULE, LIQUID FILLED ORAL DAILY
Qty: 60 CAPSULE | Refills: 11 | Status: SHIPPED | OUTPATIENT
Start: 2019-08-01 | End: 2021-05-20

## 2019-08-01 RX ORDER — METRONIDAZOLE 7.5 MG/G
GEL VAGINAL
Refills: 0 | COMMUNITY
Start: 2019-07-23 | End: 2021-08-20

## 2019-08-02 ENCOUNTER — TELEPHONE (OUTPATIENT)
Dept: FAMILY MEDICINE CLINIC | Facility: CLINIC | Age: 54
End: 2019-08-02

## 2019-08-02 PROBLEM — R79.89 ELEVATED TSH: Status: ACTIVE | Noted: 2019-07-31

## 2019-08-02 PROBLEM — L30.9 ECZEMA: Status: ACTIVE | Noted: 2019-08-02

## 2019-08-02 LAB
T4 FREE SERPL-MCNC: 1 NG/DL (ref 0.93–1.7)
TSH SERPL DL<=0.05 MIU/L-ACNC: 2.35 MIU/ML (ref 0.27–4.2)

## 2019-08-02 NOTE — TELEPHONE ENCOUNTER
----- Message from MEHNAZ Vance sent at 8/2/2019  8:19 AM EDT -----  Please call Astrid and let her know that right now her TSH is back in the normal range and looks ok. We will recheck it in 6 months.  Thanks  ----- Message -----  From: Lab, Background User  Sent: 8/2/2019   2:06 AM  To: MEHNAZ Vance

## 2019-08-02 NOTE — PROGRESS NOTES
"Astrid Nobles is a 54 y.o. female who presents for follow up of rash on right leg.    Chief Complaint   Patient presents with   • Rash       Patient states that the rash on her right lower extremity is getting worse. It has remained just on that leg but has spread up the back of the leg. It is very pruritic but there is no pain. She has used OTC  cortisone cream without relief.          No Known Allergies    Health Maintenance   Topic Date Due   • ZOSTER VACCINE (1 of 2) 02/15/2015   • COLONOSCOPY  04/29/2016   • INFLUENZA VACCINE  08/01/2019   • PAP SMEAR  08/23/2019   • MAMMOGRAM  12/26/2019   • LIPID PANEL  07/05/2020   • ANNUAL PHYSICAL  07/06/2020   • TDAP/TD VACCINES (2 - Td) 07/05/2029   • HEPATITIS C SCREENING  Completed        ROS    Review of Systems   Constitutional: Positive for fatigue.   Skin: Positive for rash.   All other systems reviewed and are negative.      Vitals:    08/01/19 1547   BP: 110/70   Pulse: 70   Resp: 18   Temp: 96.8 °F (36 °C)   TempSrc: Temporal   SpO2: 98%   Weight: 76.4 kg (168 lb 6.4 oz)   Height: 157.5 cm (62\")   PainSc: 0-No pain         Current Outpatient Medications:   •  ascorbic acid (VITAMIN C) 1000 MG tablet, Take 1,000 mg by mouth Daily., Disp: , Rfl:   •  BIOTIN PO, Take 1 tablet by mouth., Disp: , Rfl:   •  Prenatal Vit-Fe Fumarate-FA (PRENATAL 27-1) 27-1 MG tablet tablet, Take 1 tablet by mouth Daily., Disp: , Rfl:   •  tiZANidine (ZANAFLEX) 4 MG tablet, 1 tablet qhs - may take 1/2 tab q8 during the day., Disp: 60 tablet, Rfl: 1  •  Crisaborole 2 % ointment, Apply 1 application topically 2 (Two) Times a Day., Disp: 60 g, Rfl: 2  •  metroNIDAZOLE (METROGEL) 0.75 % vaginal gel, , Disp: , Rfl: 0  •  omega-3 acid ethyl esters (LOVAZA) 1 g capsule, Take 2 capsules by mouth Daily., Disp: 60 capsule, Rfl: 11  No current facility-administered medications for this visit.     Facility-Administered Medications Ordered in Other Visits:   •  heparin flush (porcine) 100 UNIT/ML " injection 500 Units, 500 Units, Intravenous, PRN, Kaden Oswald MD  •  sodium chloride 0.9 % flush 10 mL, 10 mL, Intravenous, PRN, Kaden Oswald MD  •  sodium chloride 0.9 % infusion, 100 mL/hr, Intravenous, Continuous, Kathy Salazar APRN, Last Rate: 100 mL/hr at 08/09/18 1125, 100 mL/hr at 08/09/18 1125    PE    Physical Exam   Constitutional: She is oriented to person, place, and time. She appears well-developed and well-nourished.   HENT:   Head: Normocephalic and atraumatic.   Cardiovascular: Normal rate, regular rhythm and normal heart sounds. Exam reveals no gallop and no friction rub.   No murmur heard.  Pulmonary/Chest: Effort normal and breath sounds normal. No stridor. No respiratory distress. She has no wheezes. She has no rales.   Neurological: She is alert and oriented to person, place, and time.   Skin: Skin is warm and dry. Rash noted. Rash is urticarial.        Psychiatric: She has a normal mood and affect. Her behavior is normal. Judgment and thought content normal.   Nursing note and vitals reviewed.       A/P    Problem List Items Addressed This Visit        Cardiovascular and Mediastinum    Mixed hyperlipidemia    Relevant Medications    omega-3 acid ethyl esters (LOVAZA) 1 g capsule       Musculoskeletal and Integument    Eczema - Primary    Relevant Medications    Crisaborole 2 % ointment       Other    Elevated TSH    Relevant Orders    TSH (Completed)    T4, Free (Completed)        Discussed lab results and will recheck TSH level today along with T4 to determine if medication needed for elevated TSH. Will also start Lovaza for hyperlipidemia. Will recheck levels in 6 months.    Plan of care reviewed with patient at the conclusion of today's visit. Education was provided regarding diagnosis, management and any prescribed or recommended OTC medications.  Patient verbalizes understanding of and agreement with management plan.    Return in about 6 months (around 2/1/2020) for Recheck.      Kimberley Cooper, APRN

## 2019-09-06 ENCOUNTER — HOSPITAL ENCOUNTER (OUTPATIENT)
Dept: MAMMOGRAPHY | Facility: HOSPITAL | Age: 54
Discharge: HOME | End: 2019-09-06
Admitting: OBSTETRICS & GYNECOLOGY

## 2019-09-06 DIAGNOSIS — Z12.31 VISIT FOR SCREENING MAMMOGRAM: ICD-10-CM

## 2019-09-06 PROCEDURE — 77063 BREAST TOMOSYNTHESIS BI: CPT | Performed by: RADIOLOGY

## 2019-09-06 PROCEDURE — 77063 BREAST TOMOSYNTHESIS BI: CPT

## 2019-09-06 PROCEDURE — 77067 SCR MAMMO BI INCL CAD: CPT | Performed by: RADIOLOGY

## 2019-09-06 PROCEDURE — 77067 SCR MAMMO BI INCL CAD: CPT

## 2019-11-01 ENCOUNTER — OFFICE VISIT (OUTPATIENT)
Dept: ONCOLOGY | Facility: CLINIC | Age: 54
End: 2019-11-01

## 2019-11-01 ENCOUNTER — LAB (OUTPATIENT)
Dept: LAB | Facility: HOSPITAL | Age: 54
End: 2019-11-01

## 2019-11-01 VITALS
BODY MASS INDEX: 32.41 KG/M2 | HEIGHT: 62 IN | DIASTOLIC BLOOD PRESSURE: 70 MMHG | TEMPERATURE: 97.6 F | HEART RATE: 60 BPM | WEIGHT: 176.1 LBS | SYSTOLIC BLOOD PRESSURE: 131 MMHG | OXYGEN SATURATION: 98 % | RESPIRATION RATE: 16 BRPM

## 2019-11-01 DIAGNOSIS — C16.2 MALIGNANT NEOPLASM OF BODY OF STOMACH (HCC): Primary | Chronic | ICD-10-CM

## 2019-11-01 DIAGNOSIS — C16.2 MALIGNANT NEOPLASM OF BODY OF STOMACH (HCC): ICD-10-CM

## 2019-11-01 DIAGNOSIS — Z12.11 SCREENING FOR COLON CANCER: ICD-10-CM

## 2019-11-01 LAB
ANION GAP SERPL CALCULATED.3IONS-SCNC: 8 MMOL/L (ref 5–15)
BUN BLD-MCNC: 15 MG/DL (ref 6–20)
BUN/CREAT SERPL: 21.1 (ref 7–25)
CALCIUM SPEC-SCNC: 8.6 MG/DL (ref 8.6–10.5)
CHLORIDE SERPL-SCNC: 104 MMOL/L (ref 98–107)
CO2 SERPL-SCNC: 29 MMOL/L (ref 22–29)
CREAT BLD-MCNC: 0.71 MG/DL (ref 0.57–1)
ERYTHROCYTE [DISTWIDTH] IN BLOOD BY AUTOMATED COUNT: 16.8 % (ref 12.3–15.4)
GFR SERPL CREATININE-BSD FRML MDRD: 104 ML/MIN/1.73
GLUCOSE BLD-MCNC: 94 MG/DL (ref 65–99)
HCT VFR BLD AUTO: 39.5 % (ref 34–46.6)
HGB BLD-MCNC: 12.3 G/DL (ref 12–15.9)
LYMPHOCYTES # BLD AUTO: 1.5 10*3/MM3 (ref 0.7–3.1)
LYMPHOCYTES NFR BLD AUTO: 34 % (ref 19.6–45.3)
MCH RBC QN AUTO: 26.1 PG (ref 26.6–33)
MCHC RBC AUTO-ENTMCNC: 31.2 G/DL (ref 31.5–35.7)
MCV RBC AUTO: 83.4 FL (ref 79–97)
MONOCYTES # BLD AUTO: 0.4 10*3/MM3 (ref 0.1–0.9)
MONOCYTES NFR BLD AUTO: 8.4 % (ref 5–12)
NEUTROPHILS # BLD AUTO: 2.5 10*3/MM3 (ref 1.7–7)
NEUTROPHILS NFR BLD AUTO: 57.6 % (ref 42.7–76)
PLATELET # BLD AUTO: 247 10*3/MM3 (ref 140–450)
PMV BLD AUTO: 7.6 FL (ref 6–12)
POTASSIUM BLD-SCNC: 4.3 MMOL/L (ref 3.5–5.2)
RBC # BLD AUTO: 4.73 10*6/MM3 (ref 3.77–5.28)
SODIUM BLD-SCNC: 141 MMOL/L (ref 136–145)
WBC NRBC COR # BLD: 4.3 10*3/MM3 (ref 3.4–10.8)

## 2019-11-01 PROCEDURE — 36415 COLL VENOUS BLD VENIPUNCTURE: CPT

## 2019-11-01 PROCEDURE — 85025 COMPLETE CBC W/AUTO DIFF WBC: CPT

## 2019-11-01 PROCEDURE — 99214 OFFICE O/P EST MOD 30 MIN: CPT | Performed by: NURSE PRACTITIONER

## 2019-11-01 PROCEDURE — 80048 BASIC METABOLIC PNL TOTAL CA: CPT

## 2019-11-01 NOTE — PROGRESS NOTES
CHIEF COMPLAINT: Follow-up gastric carcinoma    Problem List:     Malignant neoplasm of body of stomach (CMS/HCC)    2/6/2018 Initial Diagnosis     Gastric cancer.  53-year-old female with a three-month history of indigestion and upper abdominal pain.  She was started on omeprazole for presumed reflux, symptoms were not relieved.  She underwent an EGD on 2/6/2018 with Dr. Matt that revealed a large, friable and ulcerated mass in the body of the stomach.  Biopsies returned moderately to poorly differentiated adenocarcinoma.  Staging studies including CT scan of the chest abdomen and pelvis were negative for distant disease.  She underwent diagnostic laparoscopic with peritoneal washings to complete staging on 3/13/2018.  Baseline CBC 3/9/2018 WBC 4820, hemoglobin 8.5, hematocrit 28.9%, platelet count 351,000, MCV 64.8, MCH 19.1, MCHC 29.4.  CEA normal at less than 0.50.  HER-2/erik testing from gastric body biopsy was negative.  CMP was normal other than for serum calcium slightly decreased at 8.6.           2/6/2018 Procedure     EGD revealed gastric ulcerated mass in body of stomach.  Pathology returned:   Final Diagnosis    1. GASTRIC BODY BIOPSY:  Invasive moderate to poorly differentiated adenocarcinoma with ulceration (see comment).   2. GASTRIC ANTRUM BIOPSY:  Minimal chronic gastritis without activity.  No intestinal metaplasia or dysplasia identified.  No Helicobacter pylori-like organisms identified on routine stains.   HER2/erik testing negative  FLUORESCENCE IN-SITU HYBRIDIZATION (FISH) REPORT:  Negative/Not Amplified  HER2/WILL-17 Ratio: 1.3           2/9/2018 Imaging     CT abdomen/pelvis IMPRESSION:     There is mild thickening and prominence of the mid gastric wall of the  body of the stomach, middle third.     Extragastric mass is not identified. The lesser sac and retrogastric  spaces are clear.     Visceral tumor, adenopathy, stranding or caking is not currently  identified as described. There is  "no retroperitoneal or retrogastric  adenopathy. The lower lung zones are clear with no evidence of reactive  pleural effusion or occult mass. There is no liver metastasis and no  other acute focal CT abnormality is identified within the abdomen or  pelvis.     Incidental note is made of a nonobstructing stone right kidney right  lower pole.         3/9/2018 Imaging     CT Chest IMPRESSION:  No acute intrathoracic abnormality. No definite evidence of  metastatic disease.         3/13/2018 Procedure     Diagnostic laproscopy with biopsy and lysis of adhesions.  Pathology returned:  Final Diagnosis   PERITONEAL NODULE, EXCISIONAL BIOPSY:  Benign fibrotic nodule. (See comment)  JFJ/[id number]    Electronically signed by Boaz Jerez MD on 3/15/2018 at 1624   Comment See result below    The biopsy shows a lobulated fibrotic nodule with some intermixed lymphoid cells. The nodule does not appear neoplastic. One cannot entirely exclude a \"burned out\" lymph node, or other implant. There is no evidence of carcinoma.      Abdominal wall washings benign:  Final Diagnosis    1. ABDOMINAL WASH, WALL:  Negative for malignancy.  Scant cellularity; chronic inflammatory cells and scattered benign mesothelial cells.   2. ABDOMINAL WASH, WALL:  Negative for malignancy.  Scant cellularity; chronic inflammatory cells and scattered benign mesothelial cells.   3. ABDOMINAL WASH, WALL:  Negative for malignancy.  Benign mesothelial cells and mixed inflammatory cells.                4/5/2018 - 9/27/2018 Chemotherapy     OP GASTRIC FLOT OXALIplatin / Leucovorin / Fluorouracil/Taxotere  x8         5/9/2018 Genetic Testing     Genetic testing negative for cancer next panel         5/17/2018 Imaging     CT chest abdomen and pelvis showed Shrinkage of the gastric tumor with no evidence of metastasis         6/29/2018 Surgery     Surgery       Procedure:  Subtotal gastrectomy showing 0.7 cm 2 out of 10 node positive moderately differentiated " adenocarcinoma of the stomach.  YpT2 N1 M0 stage IIa         10/29/2018 Imaging     CT chest abdomen pelvis with contrast shows no evidence of metastasis or recurrence but does have bilateral basilar pulmonary infiltrates.  Was started on Levaquin 10/19/18.         4/19/2019 Imaging     CT chest, abdomen and pelvis IMPRESSION:  1. Considerable atelectasis at the lung bases and/or scarring similar to  prior without focal consolidation or effusion.  2. Stable appearance of the abdomen and pelvis without evidence for  recurrent disease; specifically, no new soft tissue nodular enhancement  with stable appearance of the surgical margin subtotal gastrectomy and  patulous appearance of the distal esophagus similar to prior. No new  peritoneal reticulation or fluid.         7/8/2019 Procedure     EGD with Dr. Matt, impression: Esophagitis.  Subtotal gastrectomy with normal-appearing mucosa.            HISTORY OF PRESENT ILLNESS:  The patient is a 54 y.o. female, here for follow up on management of follow-up gastric carcinoma.  The patient has been feeling well since we saw her last with no new complaints.  Had an EGD in July with Dr. Matt with no reported abnormalities.  Still has not had screening colonoscopy.  Appetite is normal, no change in her bowel bladder habits.  No new pain.  Has not gotten her CT scans yet.    Past Medical History:   Diagnosis Date   • Abdominal pain    • Acid reflux    • Arthritis    • Gastric cancer (CMS/HCC)    • Gastric mass    • Gastric ulcer    • History of transfusion     no reaction 2018   • Wears glasses      Past Surgical History:   Procedure Laterality Date   • CERVICAL BIOPSY  W/ LOOP ELECTRODE EXCISION     • DIAGNOSTIC LAPAROSCOPY N/A 3/13/2018    Procedure: DIAGNOSTIC LAPAROSCOPY WITH BIOPSY, LYSIS OF ADHESIONS;  Surgeon: Nicole Crooks MD;  Location: [facility];  Service: General   • ENDOSCOPY     • GASTRECTOMY N/A 6/29/2018    Procedure: SUBTOTAL GASTRECTOMY;  Surgeon: Nicole  "JONES Crooks MD;  Location:  [facility];  Service: General   • STOMACH SURGERY  06/09/2018    For removal of mass   • TUBAL ABDOMINAL LIGATION     • VENOUS ACCESS DEVICE (PORT) INSERTION     • VENOUS ACCESS DEVICE (PORT) REMOVAL N/A 12/21/2018    Procedure: PORT REMOVAL;  Surgeon: Nicole Crooks MD;  Location: [facility];  Service: General       No Known Allergies    Family History and Social History reviewed and changed as necessary      REVIEW OF SYSTEM:   Review of Systems   Constitutional: Negative for appetite change, chills, diaphoresis, fatigue, fever and unexpected weight change.   HENT:   Negative for mouth sores, sore throat and trouble swallowing.    Eyes: Negative for icterus.   Respiratory: Negative for cough, hemoptysis and shortness of breath.    Cardiovascular: Negative for chest pain, leg swelling and palpitations.   Gastrointestinal: Negative for abdominal distention, abdominal pain, blood in stool, constipation, diarrhea, nausea and vomiting.   Endocrine: Negative for hot flashes.   Genitourinary: Negative for bladder incontinence, difficulty urinating, dysuria, frequency and hematuria.    Musculoskeletal: Negative for gait problem, neck pain and neck stiffness.   Skin: Negative for rash.   Neurological: Negative for dizziness, gait problem, headaches, light-headedness and numbness.   Hematological: Negative for adenopathy. Does not bruise/bleed easily.   Psychiatric/Behavioral: Negative for depression. The patient is not nervous/anxious.    All other systems reviewed and are negative.       PHYSICAL EXAM    Vitals:    11/01/19 1017   BP: 131/70   Pulse: 60   Resp: 16   Temp: 97.6 °F (36.4 °C)   TempSrc: Temporal   SpO2: 98%   Weight: 79.9 kg (176 lb 1.6 oz)   Height: 157.5 cm (62\")     Constitutional: Appears well-developed and well-nourished. No distress.   ECOG: (0) Fully active, able to carry on all predisease performance without restriction  HENT:   Head: Normocephalic.   Mouth/Throat: Oropharynx " is clear and moist.   Eyes: Conjunctivae are normal. Pupils are equal, round, and reactive to light. No scleral icterus.   Neck: Neck supple. No JVD present. No thyromegaly present.   Cardiovascular: Normal rate, regular rhythm and normal heart sounds.    Pulmonary/Chest: Breath sounds normal. No respiratory distress.   Abdominal: Soft. Exhibits no distension and no mass. There is no hepatosplenomegaly. There is no tenderness. There is no rebound and no guarding.   Musculoskeletal:Exhibits no edema, tenderness or deformity.   Neurological: Alert and oriented to person, place, and time. Exhibits normal muscle tone.   Skin: No ecchymosis, no petechiae and no rash noted. Not diaphoretic. No cyanosis. Nails show no clubbing.   Psychiatric: Normal mood and affect.   Vitals reviewed.  Labs reviewed.    Lab Results   Component Value Date    HGB 12.3 11/01/2019    HCT 39.5 11/01/2019    MCV 83.4 11/01/2019     11/01/2019    WBC 4.30 11/01/2019    NEUTROABS 2.50 11/01/2019    LYMPHSABS 1.50 11/01/2019    MONOSABS 0.40 11/01/2019    EOSABS 0.00 06/30/2018    BASOSABS 0.01 06/30/2018       Lab Results   Component Value Date    GLUCOSE 94 11/01/2019    BUN 15 11/01/2019    CREATININE 0.71 11/01/2019     11/01/2019    K 4.3 11/01/2019     11/01/2019    CO2 29.0 11/01/2019    CALCIUM 8.6 11/01/2019    PROTEINTOT 7.4 07/05/2019    ALBUMIN 4.20 07/05/2019    BILITOT 0.4 07/05/2019    ALKPHOS 113 07/05/2019    AST 22 07/05/2019    ALT 15 07/05/2019         ASSESSMENT & PLAN:    Gastric carcinoma: Clinically doing well no new concerning symptoms.  She had an EGD in July with Dr. Matt with no reported abnormalities.  She has not had ordered CT scans yet, we will get those scheduled and I will call her with the results once completed.  Per [facility] guidelines, we will get CT chest abdomen pelvis every 6 months until June 2020 and then annually out to June 2023.  No hint of B12 or iron deficiency thus far and we will  watch for such with serial CBC which was normal today as shown above.  Otherwise would get CBC and chemistry profile as clinically indicated.    I will get her back to Dr. Matt for screening colonoscopy she has never had one and she is 54.  I will see her back in 6 months for follow-up.        I spent a total of 25 minutes in direct patient care, greater than 16  minutes (greater than 50%) were spent in coordination of care, and counseling the patient regarding  (diagnosis) . Answered any questions patient had regarding medications and plan of care.     Kathy Salazar, APRN    11/01/2019

## 2019-11-08 ENCOUNTER — HOSPITAL ENCOUNTER (OUTPATIENT)
Dept: CT IMAGING | Facility: HOSPITAL | Age: 54
Discharge: HOME | End: 2019-11-08
Admitting: INTERNAL MEDICINE

## 2019-11-08 DIAGNOSIS — C16.2 MALIGNANT NEOPLASM OF BODY OF STOMACH (HCC): Chronic | ICD-10-CM

## 2019-11-08 PROCEDURE — 74177 CT ABD & PELVIS W/CONTRAST: CPT

## 2019-11-08 PROCEDURE — 71260 CT THORAX DX C+: CPT

## 2019-11-08 PROCEDURE — 25010000002 IOPAMIDOL 61 % SOLUTION: Performed by: INTERNAL MEDICINE

## 2019-11-08 RX ORDER — SODIUM, POTASSIUM,MAG SULFATES 17.5-3.13G
SOLUTION, RECONSTITUTED, ORAL ORAL
Qty: 2 BOTTLE | Refills: 0 | Status: SHIPPED | OUTPATIENT
Start: 2019-11-08 | End: 2021-06-14 | Stop reason: SDUPTHER

## 2019-11-08 RX ADMIN — BARIUM SULFATE 450 ML: 21 SUSPENSION ORAL at 12:00

## 2019-11-08 RX ADMIN — IOPAMIDOL 80 ML: 612 INJECTION, SOLUTION INTRAVENOUS at 13:10

## 2019-11-11 DIAGNOSIS — C16.2 MALIGNANT NEOPLASM OF BODY OF STOMACH (HCC): Primary | ICD-10-CM

## 2019-11-11 DIAGNOSIS — R93.89 ABNORMAL CT OF THE CHEST: ICD-10-CM

## 2019-11-18 ENCOUNTER — TELEPHONE (OUTPATIENT)
Dept: GASTROENTEROLOGY | Facility: CLINIC | Age: 54
End: 2019-11-18

## 2019-11-18 NOTE — TELEPHONE ENCOUNTER
I called patient back. I informed her that her Colonoscopy was reschedule for 12/20/19. Patient voiced understanding.

## 2019-11-20 ENCOUNTER — DOCUMENTATION (OUTPATIENT)
Dept: ONCOLOGY | Facility: CLINIC | Age: 54
End: 2019-11-20

## 2019-11-20 NOTE — PROGRESS NOTES
CT scans 11/8/2019 revealed enlarging right paratracheal node from 12 mm to 21 mm.  CT scans otherwise negative.  PET/CT ordered for further evaluation of the enlarging right paratracheal node.  Discussed with patient via telephone, she states understanding and agreement with plan.

## 2019-11-25 ENCOUNTER — HOSPITAL ENCOUNTER (OUTPATIENT)
Dept: PET IMAGING | Facility: HOSPITAL | Age: 54
Discharge: HOME | End: 2019-11-25

## 2019-11-25 ENCOUNTER — HOSPITAL ENCOUNTER (OUTPATIENT)
Dept: PET IMAGING | Facility: HOSPITAL | Age: 54
Discharge: HOME | End: 2019-11-25
Admitting: NURSE PRACTITIONER

## 2019-11-25 DIAGNOSIS — R93.89 ABNORMAL CT OF THE CHEST: ICD-10-CM

## 2019-11-25 DIAGNOSIS — C16.2 MALIGNANT NEOPLASM OF BODY OF STOMACH (HCC): ICD-10-CM

## 2019-11-25 LAB — GLUCOSE BLDC GLUCOMTR-MCNC: 74 MG/DL (ref 70–130)

## 2019-11-25 PROCEDURE — A9552 F18 FDG: HCPCS | Performed by: NURSE PRACTITIONER

## 2019-11-25 PROCEDURE — 82962 GLUCOSE BLOOD TEST: CPT

## 2019-11-25 PROCEDURE — 78815 PET IMAGE W/CT SKULL-THIGH: CPT

## 2019-11-25 PROCEDURE — 0 FLUDEOXYGLUCOSE F18 SOLUTION: Performed by: NURSE PRACTITIONER

## 2019-11-25 RX ADMIN — FLUDEOXYGLUCOSE F18 1 DOSE: 300 INJECTION INTRAVENOUS at 13:37

## 2019-12-02 ENCOUNTER — OFFICE VISIT (OUTPATIENT)
Dept: ONCOLOGY | Facility: CLINIC | Age: 54
End: 2019-12-02

## 2019-12-02 VITALS
RESPIRATION RATE: 18 BRPM | WEIGHT: 175 LBS | SYSTOLIC BLOOD PRESSURE: 155 MMHG | BODY MASS INDEX: 32.2 KG/M2 | HEART RATE: 88 BPM | DIASTOLIC BLOOD PRESSURE: 86 MMHG | OXYGEN SATURATION: 100 % | TEMPERATURE: 97.9 F | HEIGHT: 62 IN

## 2019-12-02 DIAGNOSIS — C16.2 MALIGNANT NEOPLASM OF BODY OF STOMACH (HCC): Primary | Chronic | ICD-10-CM

## 2019-12-02 PROBLEM — C16.9 GASTRIC CARCINOMA (HCC): Status: RESOLVED | Noted: 2019-12-02 | Resolved: 2019-12-02

## 2019-12-02 PROBLEM — C16.9 GASTRIC CARCINOMA (HCC): Status: ACTIVE | Noted: 2019-12-02

## 2019-12-02 PROCEDURE — 99214 OFFICE O/P EST MOD 30 MIN: CPT | Performed by: INTERNAL MEDICINE

## 2019-12-02 NOTE — PROGRESS NOTES
CHIEF COMPLAINT: Recurrent gastric carcinoma    Problem List:     Malignant neoplasm of body of stomach (CMS/HCC)    2/6/2018 Initial Diagnosis     Gastric cancer.  53-year-old female with a three-month history of indigestion and upper abdominal pain.  She was started on omeprazole for presumed reflux, symptoms were not relieved.  She underwent an EGD on 2/6/2018 with Dr. Matt that revealed a large, friable and ulcerated mass in the body of the stomach.  Biopsies returned moderately to poorly differentiated adenocarcinoma.  Staging studies including CT scan of the chest abdomen and pelvis were negative for distant disease.  She underwent diagnostic laparoscopic with peritoneal washings to complete staging on 3/13/2018.  Baseline CBC 3/9/2018 WBC 4820, hemoglobin 8.5, hematocrit 28.9%, platelet count 351,000, MCV 64.8, MCH 19.1, MCHC 29.4.  CEA normal at less than 0.50.  HER-2/erik testing from gastric body biopsy was negative.  CMP was normal other than for serum calcium slightly decreased at 8.6.           2/6/2018 Procedure     EGD revealed gastric ulcerated mass in body of stomach.  Pathology returned:   Final Diagnosis    1. GASTRIC BODY BIOPSY:  Invasive moderate to poorly differentiated adenocarcinoma with ulceration (see comment).   2. GASTRIC ANTRUM BIOPSY:  Minimal chronic gastritis without activity.  No intestinal metaplasia or dysplasia identified.  No Helicobacter pylori-like organisms identified on routine stains.   HER2/erik testing negative  FLUORESCENCE IN-SITU HYBRIDIZATION (FISH) REPORT:  Negative/Not Amplified  HER2/WILL-17 Ratio: 1.3           2/9/2018 Imaging     CT abdomen/pelvis IMPRESSION:     There is mild thickening and prominence of the mid gastric wall of the  body of the stomach, middle third.     Extragastric mass is not identified. The lesser sac and retrogastric  spaces are clear.     Visceral tumor, adenopathy, stranding or caking is not currently  identified as described. There is  "no retroperitoneal or retrogastric  adenopathy. The lower lung zones are clear with no evidence of reactive  pleural effusion or occult mass. There is no liver metastasis and no  other acute focal CT abnormality is identified within the abdomen or  pelvis.     Incidental note is made of a nonobstructing stone right kidney right  lower pole.         3/9/2018 Imaging     CT Chest IMPRESSION:  No acute intrathoracic abnormality. No definite evidence of  metastatic disease.         3/13/2018 Procedure     Diagnostic laproscopy with biopsy and lysis of adhesions.  Pathology returned:  Final Diagnosis   PERITONEAL NODULE, EXCISIONAL BIOPSY:  Benign fibrotic nodule. (See comment)  JFJ/klb    Electronically signed by Boaz Jerez MD on 3/15/2018 at 1624   Comment See result below    The biopsy shows a lobulated fibrotic nodule with some intermixed lymphoid cells. The nodule does not appear neoplastic. One cannot entirely exclude a \"burned out\" lymph node, or other implant. There is no evidence of carcinoma.      Abdominal wall washings benign:  Final Diagnosis    1. ABDOMINAL WASH, WALL:  Negative for malignancy.  Scant cellularity; chronic inflammatory cells and scattered benign mesothelial cells.   2. ABDOMINAL WASH, WALL:  Negative for malignancy.  Scant cellularity; chronic inflammatory cells and scattered benign mesothelial cells.   3. ABDOMINAL WASH, WALL:  Negative for malignancy.  Benign mesothelial cells and mixed inflammatory cells.                4/5/2018 - 9/27/2018 Chemotherapy     OP GASTRIC FLOT OXALIplatin / Leucovorin / Fluorouracil/Taxotere  x8         5/9/2018 Genetic Testing     Genetic testing negative for cancer next panel         5/17/2018 Imaging     CT chest abdomen and pelvis showed Shrinkage of the gastric tumor with no evidence of metastasis         6/29/2018 Surgery     Surgery       Procedure:  Subtotal gastrectomy showing 0.7 cm 2 out of 10 node positive moderately differentiated " adenocarcinoma of the stomach.  YpT2 N1 M0 stage IIa         10/29/2018 Imaging     CT chest abdomen pelvis with contrast shows no evidence of metastasis or recurrence but does have bilateral basilar pulmonary infiltrates.  Was started on Levaquin 10/19/18.         4/19/2019 Imaging     CT chest, abdomen and pelvis IMPRESSION:  1. Considerable atelectasis at the lung bases and/or scarring similar to  prior without focal consolidation or effusion.  2. Stable appearance of the abdomen and pelvis without evidence for  recurrent disease; specifically, no new soft tissue nodular enhancement  with stable appearance of the surgical margin subtotal gastrectomy and  patulous appearance of the distal esophagus similar to prior. No new  peritoneal reticulation or fluid.         7/8/2019 Procedure     EGD with Dr. Matt, impression: Esophagitis.  Subtotal gastrectomy with normal-appearing mucosa.         11/8/2019 Imaging     CT chest abdomen pelvis with contrast shows enlarging paratracheal nodes         11/25/2019 Progression     PET/CT IMPRESSION:  There is extensive hypermetabolic activity seen within the  right supraclavicular region, mediastinum and hilar regions as well as  centrally within the celiac axis. Findings all suggesting diffuse  metastatic disease in the lymph nodes within the chest and upper  abdomen.            HISTORY OF PRESENT ILLNESS:  The patient is a 54 y.o. female, here for follow up on management of recurrent gastric carcinoma.  There is extensive hypermetabolic new right supraclavicular, mediastinal, hilar, and celiac axis lymphadenopathy.      Past Medical History:   Diagnosis Date   • Abdominal pain    • Acid reflux    • Arthritis    • Gastric cancer (CMS/HCC)    • Gastric mass    • Gastric ulcer    • History of transfusion     no reaction 2018   • Wears glasses      Past Surgical History:   Procedure Laterality Date   • CERVICAL BIOPSY  W/ LOOP ELECTRODE EXCISION     • DIAGNOSTIC LAPAROSCOPY  "N/A 3/13/2018    Procedure: DIAGNOSTIC LAPAROSCOPY WITH BIOPSY, LYSIS OF ADHESIONS;  Surgeon: Nicole Crooks MD;  Location:  [facility];  Service: General   • ENDOSCOPY     • GASTRECTOMY N/A 6/29/2018    Procedure: SUBTOTAL GASTRECTOMY;  Surgeon: Nicole Crooks MD;  Location:  [facility];  Service: General   • STOMACH SURGERY  06/09/2018    For removal of mass   • TUBAL ABDOMINAL LIGATION     • VENOUS ACCESS DEVICE (PORT) INSERTION     • VENOUS ACCESS DEVICE (PORT) REMOVAL N/A 12/21/2018    Procedure: PORT REMOVAL;  Surgeon: Nicole Crooks MD;  Location:  [facility];  Service: General       No Known Allergies    Family History and Social History reviewed and changed as necessary      REVIEW OF SYSTEM:   Review of Systems   Constitutional: Negative for appetite change, chills, diaphoresis, fatigue, fever and unexpected weight change.   HENT:   Negative for mouth sores, sore throat and trouble swallowing.    Eyes: Negative for icterus.   Respiratory: Negative for cough, hemoptysis and shortness of breath.    Cardiovascular: Negative for chest pain, leg swelling and palpitations.   Gastrointestinal: Negative for abdominal distention, abdominal pain, blood in stool, constipation, diarrhea, nausea and vomiting.   Endocrine: Negative for hot flashes.   Genitourinary: Negative for bladder incontinence, difficulty urinating, dysuria, frequency and hematuria.    Musculoskeletal: Negative for gait problem, neck pain and neck stiffness.   Skin: Negative for rash.   Neurological: Negative for dizziness, gait problem, headaches, light-headedness and numbness.   Hematological: Negative for adenopathy. Does not bruise/bleed easily.   Psychiatric/Behavioral: Negative for depression. The patient is not nervous/anxious.    All other systems reviewed and are negative.       PHYSICAL EXAM    Vitals:    12/02/19 1558   BP: 155/86   Pulse: 88   Resp: 18   Temp: 97.9 °F (36.6 °C)   SpO2: 100%   Weight: 79.4 kg (175 lb)   Height: 157.5 cm (62\") "     Constitutional: Appears well-developed and well-nourished. No distress.   ECOG: (0) Fully active, able to carry on all predisease performance without restriction  HENT:   Head: Normocephalic.   Mouth/Throat: Oropharynx is clear and moist.   Eyes: Conjunctivae are normal. Pupils are equal, round, and reactive to light. No scleral icterus.   Neck: Neck supple. No JVD present. No thyromegaly present.   Cardiovascular: Normal rate, regular rhythm and normal heart sounds.    Pulmonary/Chest: Breath sounds normal. No respiratory distress.   Abdominal: Soft. Exhibits no distension and no mass. There is no hepatosplenomegaly. There is no tenderness. There is no rebound and no guarding.   Musculoskeletal:Exhibits no edema, tenderness or deformity.   Neurological: Alert and oriented to person, place, and time. Exhibits normal muscle tone.   Skin: No ecchymosis, no petechiae and no rash noted. Not diaphoretic. No cyanosis. Nails show no clubbing.   Psychiatric: Normal mood and affect.   Vitals reviewed.      Lab Results   Component Value Date    HGB 12.3 11/01/2019    HCT 39.5 11/01/2019    MCV 83.4 11/01/2019     11/01/2019    WBC 4.30 11/01/2019    NEUTROABS 2.50 11/01/2019    LYMPHSABS 1.50 11/01/2019    MONOSABS 0.40 11/01/2019    EOSABS 0.00 06/30/2018    BASOSABS 0.01 06/30/2018       Lab Results   Component Value Date    GLUCOSE 94 11/01/2019    BUN 15 11/01/2019    CREATININE 0.71 11/01/2019     11/01/2019    K 4.3 11/01/2019     11/01/2019    CO2 29.0 11/01/2019    CALCIUM 8.6 11/01/2019    PROTEINTOT 7.4 07/05/2019    ALBUMIN 4.20 07/05/2019    BILITOT 0.4 07/05/2019    ALKPHOS 113 07/05/2019    AST 22 07/05/2019    ALT 15 07/05/2019                   ASSESSMENT & PLAN:    1. Gastric carcinoma  2. Darrel recurrence    Discussion: I am ordering CT-guided supraclavicular node biopsy to send for molecular testing and for HER-2/erik and plan on Xeloda oxaliplatin as we will probably have to give  significant number of courses and neuropathy had previously been an issue and the combination with taxanes is much more neurotoxic and we are unlikely to clear her of all of this adenopathy permanently.  Purposes of this ultimately is likely to be palliative and not curative and she was made aware of that.  We will get chemo education by our pharmacy doctorate Natasha Gupta and/or Marta Michelle.  Discussed with patient face-to-face 25 minutes greater than 50% spent counseling regarding this plan.  There is been a very difficult day for her.  She needs to get a PICC line or port and she is trying to decide on which but today is so emotionally overwrought that she is not ready to decide.  I will have her back to my nurse practitioner next week to make sure that she is on board with this plan as outlined above for the Xeloda oxaliplatin but she is devastated by this news today.  Hopefully we can get tissue to prove the diagnosis and to send for molecular testing including HER-2/erik which I would add Herceptin were this HER-2/erik positive but I doubt that will be the case given prior testing on her original primary.  She will likely need off work and I am fine with that given the difficulty she had previously with treatment.  Given the psychological devastation of this she may well benefit from seeing Doreen Paul and I will have my nurse practitioner discussed this with her next week.      Kaden Oswald MD    12/02/2019

## 2019-12-03 ENCOUNTER — SPECIALTY PHARMACY (OUTPATIENT)
Dept: ONCOLOGY | Facility: HOSPITAL | Age: 54
End: 2019-12-03

## 2019-12-03 DIAGNOSIS — C16.2 MALIGNANT NEOPLASM OF BODY OF STOMACH (HCC): Primary | ICD-10-CM

## 2019-12-03 RX ORDER — FAMOTIDINE 10 MG/ML
20 INJECTION, SOLUTION INTRAVENOUS AS NEEDED
Status: CANCELLED | OUTPATIENT
Start: 2019-12-16

## 2019-12-03 RX ORDER — ONDANSETRON HYDROCHLORIDE 8 MG/1
8 TABLET, FILM COATED ORAL 3 TIMES DAILY PRN
Qty: 30 TABLET | Refills: 5 | Status: SHIPPED | OUTPATIENT
Start: 2019-12-03 | End: 2021-05-20

## 2019-12-03 RX ORDER — DIPHENHYDRAMINE HYDROCHLORIDE 50 MG/ML
50 INJECTION INTRAMUSCULAR; INTRAVENOUS AS NEEDED
Status: CANCELLED | OUTPATIENT
Start: 2019-12-16

## 2019-12-03 RX ORDER — CAPECITABINE 500 MG/1
TABLET, FILM COATED ORAL
Qty: 84 TABLET | Refills: 6 | Status: SHIPPED | OUTPATIENT
Start: 2019-12-03 | End: 2019-12-03 | Stop reason: SDUPTHER

## 2019-12-03 RX ORDER — DEXTROSE MONOHYDRATE 50 MG/ML
250 INJECTION, SOLUTION INTRAVENOUS ONCE
Status: CANCELLED | OUTPATIENT
Start: 2019-12-16

## 2019-12-03 RX ORDER — PALONOSETRON 0.05 MG/ML
0.25 INJECTION, SOLUTION INTRAVENOUS ONCE
Status: CANCELLED | OUTPATIENT
Start: 2019-12-16

## 2019-12-03 RX ORDER — CAPECITABINE 500 MG/1
TABLET, FILM COATED ORAL
Qty: 84 TABLET | Refills: 6 | Status: SHIPPED | OUTPATIENT
Start: 2019-12-03 | End: 2021-05-20

## 2019-12-03 NOTE — PROGRESS NOTES
Oral Chemotherapy Teaching      Patient Name/:  Astrid Nobles   1965  Oral Chemotherapy Regimen: Xeloda 1500mg PO BID x 14 days, followed by 7 days off + IV Oxaliplatin on Day 1 of a 21 Day cycle  Date Started Medication: Cycle 1: anticipate 19    Additional Notes: Received referral from , plan for Xeloda + Oxaliplatin for treatment of gastric cancer to begin mid-December. Currently scheduled for chemo education on 19 with first infusion date tentatively scheduled for 19. Released script for Xeloda 1500mg PO BID x 14 days, followed by 7 days off #84 with 6 RF to [facility] to begin processing.     **Received notification from [facility] patient insurance restricts to [facility] Released script for Xeloda to [facility].

## 2019-12-09 ENCOUNTER — SPECIALTY PHARMACY (OUTPATIENT)
Dept: ONCOLOGY | Facility: HOSPITAL | Age: 54
End: 2019-12-09

## 2019-12-09 ENCOUNTER — OFFICE VISIT (OUTPATIENT)
Dept: ONCOLOGY | Facility: CLINIC | Age: 54
End: 2019-12-09

## 2019-12-09 ENCOUNTER — HOSPITAL ENCOUNTER (OUTPATIENT)
Dept: ONCOLOGY | Facility: HOSPITAL | Age: 54
Discharge: HOME | End: 2019-12-09

## 2019-12-09 VITALS
BODY MASS INDEX: 31.28 KG/M2 | DIASTOLIC BLOOD PRESSURE: 71 MMHG | HEIGHT: 62 IN | HEART RATE: 74 BPM | WEIGHT: 170 LBS | SYSTOLIC BLOOD PRESSURE: 118 MMHG | TEMPERATURE: 98.1 F | RESPIRATION RATE: 16 BRPM | OXYGEN SATURATION: 96 %

## 2019-12-09 DIAGNOSIS — C77.1 METASTASIS TO INTRATHORACIC LYMPH NODES (HCC): ICD-10-CM

## 2019-12-09 DIAGNOSIS — C16.2 MALIGNANT NEOPLASM OF BODY OF STOMACH (HCC): Primary | Chronic | ICD-10-CM

## 2019-12-09 PROCEDURE — 99214 OFFICE O/P EST MOD 30 MIN: CPT | Performed by: NURSE PRACTITIONER

## 2019-12-09 RX ORDER — LIDOCAINE AND PRILOCAINE 25; 25 MG/G; MG/G
CREAM TOPICAL AS NEEDED
Qty: 30 G | Refills: 3 | Status: SHIPPED | OUTPATIENT
Start: 2019-12-09 | End: 2021-08-20

## 2019-12-09 NOTE — PROGRESS NOTES
CHIEF COMPLAINT:  Gastric carcinoma with terrance recurrence    Problem List:     Malignant neoplasm of body of stomach (CMS/HCC)    2/6/2018 Initial Diagnosis     Gastric cancer.  53-year-old female with a three-month history of indigestion and upper abdominal pain.  She was started on omeprazole for presumed reflux, symptoms were not relieved.  She underwent an EGD on 2/6/2018 with Dr. Matt that revealed a large, friable and ulcerated mass in the body of the stomach.  Biopsies returned moderately to poorly differentiated adenocarcinoma.  Staging studies including CT scan of the chest abdomen and pelvis were negative for distant disease.  She underwent diagnostic laparoscopic with peritoneal washings to complete staging on 3/13/2018.  Baseline CBC 3/9/2018 WBC 4820, hemoglobin 8.5, hematocrit 28.9%, platelet count 351,000, MCV 64.8, MCH 19.1, MCHC 29.4.  CEA normal at less than 0.50.  HER-2/erik testing from gastric body biopsy was negative.  CMP was normal other than for serum calcium slightly decreased at 8.6.        2/6/2018 Procedure     EGD revealed gastric ulcerated mass in body of stomach.  Pathology returned:   Final Diagnosis    1. GASTRIC BODY BIOPSY:  Invasive moderate to poorly differentiated adenocarcinoma with ulceration (see comment).   2. GASTRIC ANTRUM BIOPSY:  Minimal chronic gastritis without activity.  No intestinal metaplasia or dysplasia identified.  No Helicobacter pylori-like organisms identified on routine stains.   HER2/erik testing negative  FLUORESCENCE IN-SITU HYBRIDIZATION (FISH) REPORT:  Negative/Not Amplified  HER2/WILL-17 Ratio: 1.3        2/9/2018 Imaging     CT abdomen/pelvis IMPRESSION:     There is mild thickening and prominence of the mid gastric wall of the  body of the stomach, middle third.     Extragastric mass is not identified. The lesser sac and retrogastric  spaces are clear.     Visceral tumor, adenopathy, stranding or caking is not currently  identified as described.  "There is no retroperitoneal or retrogastric  adenopathy. The lower lung zones are clear with no evidence of reactive  pleural effusion or occult mass. There is no liver metastasis and no  other acute focal CT abnormality is identified within the abdomen or  pelvis.     Incidental note is made of a nonobstructing stone right kidney right  lower pole.      3/9/2018 Imaging     CT Chest IMPRESSION:  No acute intrathoracic abnormality. No definite evidence of  metastatic disease.      3/13/2018 Procedure     Diagnostic laproscopy with biopsy and lysis of adhesions.  Pathology returned:  Final Diagnosis   PERITONEAL NODULE, EXCISIONAL BIOPSY:  Benign fibrotic nodule. (See comment)  JFJ/klb    Electronically signed by Boaz Jerez MD on 3/15/2018 at 1624   Comment See result below    The biopsy shows a lobulated fibrotic nodule with some intermixed lymphoid cells. The nodule does not appear neoplastic. One cannot entirely exclude a \"burned out\" lymph node, or other implant. There is no evidence of carcinoma.      Abdominal wall washings benign:  Final Diagnosis    1. ABDOMINAL WASH, WALL:  Negative for malignancy.  Scant cellularity; chronic inflammatory cells and scattered benign mesothelial cells.   2. ABDOMINAL WASH, WALL:  Negative for malignancy.  Scant cellularity; chronic inflammatory cells and scattered benign mesothelial cells.   3. ABDOMINAL WASH, WALL:  Negative for malignancy.  Benign mesothelial cells and mixed inflammatory cells.             4/5/2018 - 9/27/2018 Chemotherapy     OP GASTRIC FLOT OXALIplatin / Leucovorin / Fluorouracil/Taxotere  x8      5/9/2018 Genetic Testing     Genetic testing negative for cancer next panel      5/17/2018 Imaging     CT chest abdomen and pelvis showed Shrinkage of the gastric tumor with no evidence of metastasis      6/29/2018 Surgery     Surgery       Procedure:  Subtotal gastrectomy showing 0.7 cm 2 out of 10 node positive moderately differentiated adenocarcinoma of " the stomach.  YpT2 N1 M0 stage IIa      10/29/2018 Imaging     CT chest abdomen pelvis with contrast shows no evidence of metastasis or recurrence but does have bilateral basilar pulmonary infiltrates.  Was started on Levaquin 10/19/18.      4/19/2019 Imaging     CT chest, abdomen and pelvis IMPRESSION:  1. Considerable atelectasis at the lung bases and/or scarring similar to  prior without focal consolidation or effusion.  2. Stable appearance of the abdomen and pelvis without evidence for  recurrent disease; specifically, no new soft tissue nodular enhancement  with stable appearance of the surgical margin subtotal gastrectomy and  patulous appearance of the distal esophagus similar to prior. No new  peritoneal reticulation or fluid.      7/8/2019 Procedure     EGD with Dr. Matt, impression: Esophagitis.  Subtotal gastrectomy with normal-appearing mucosa.      11/8/2019 Imaging     CT chest abdomen pelvis with contrast shows enlarging paratracheal nodes      11/25/2019 Progression     PET/CT IMPRESSION:  There is extensive hypermetabolic activity seen within the  right supraclavicular region, mediastinum and hilar regions as well as  centrally within the celiac axis. Findings all suggesting diffuse  metastatic disease in the lymph nodes within the chest and upper  abdomen.         HISTORY OF PRESENT ILLNESS:  The patient is a 54 y.o. female, here for follow up on management of metastatic gastric carcinoma.  The patient is here today with her mother and sister to discuss plan of care regarding her metastatic gastric cancer.  Obviously this is quite distressing to her and her family.  She has questions about length of therapy.  She has met with our pharmacist to discuss more about chemotherapy with Xeloda and oxaliplatin.  Has decided she will go with a port, she has had one in the past and is familiar with it.  She denies any pain.  She denies any difficulty swallowing, no nausea or vomiting.    Past Medical  History:   Diagnosis Date   • Abdominal pain    • Acid reflux    • Arthritis    • Gastric cancer (CMS/HCC)    • Gastric mass    • Gastric ulcer    • History of transfusion     no reaction 2018   • Wears glasses      Past Surgical History:   Procedure Laterality Date   • CERVICAL BIOPSY  W/ LOOP ELECTRODE EXCISION     • DIAGNOSTIC LAPAROSCOPY N/A 3/13/2018    Procedure: DIAGNOSTIC LAPAROSCOPY WITH BIOPSY, LYSIS OF ADHESIONS;  Surgeon: Nicole Crooks MD;  Location:  [facility];  Service: General   • ENDOSCOPY     • GASTRECTOMY N/A 6/29/2018    Procedure: SUBTOTAL GASTRECTOMY;  Surgeon: Nicole Crooks MD;  Location:  [facility];  Service: General   • STOMACH SURGERY  06/09/2018    For removal of mass   • TUBAL ABDOMINAL LIGATION     • VENOUS ACCESS DEVICE (PORT) INSERTION     • VENOUS ACCESS DEVICE (PORT) REMOVAL N/A 12/21/2018    Procedure: PORT REMOVAL;  Surgeon: Nicole Crooks MD;  Location:  [facility];  Service: General       No Known Allergies    Family History and Social History reviewed and changed as necessary      REVIEW OF SYSTEM:   Review of Systems   Constitutional: Negative for appetite change, chills, diaphoresis, fatigue, fever and unexpected weight change.   HENT:   Negative for mouth sores, sore throat and trouble swallowing.    Eyes: Negative for icterus.   Respiratory: Negative for cough, hemoptysis and shortness of breath.    Cardiovascular: Negative for chest pain, leg swelling and palpitations.   Gastrointestinal: Negative for abdominal distention, abdominal pain, blood in stool, constipation, diarrhea, nausea and vomiting.   Endocrine: Negative for hot flashes.   Genitourinary: Negative for bladder incontinence, difficulty urinating, dysuria, frequency and hematuria.    Musculoskeletal: Negative for gait problem, neck pain and neck stiffness.   Skin: Negative for rash.   Neurological: Negative for dizziness, gait problem, headaches, light-headedness and numbness.   Hematological: Negative for  "adenopathy. Does not bruise/bleed easily.   Psychiatric/Behavioral: Negative for depression. The patient is not nervous/anxious.    All other systems reviewed and are negative.       PHYSICAL EXAM    Vitals:    12/09/19 1406   BP: 118/71   Pulse: 74   Resp: 16   Temp: 98.1 °F (36.7 °C)   SpO2: 96%   Weight: 77.1 kg (170 lb)   Height: 157.5 cm (62\")     Constitutional: Appears well-developed and well-nourished. No distress.   ECOG: (0) Fully active, able to carry on all predisease performance without restriction  HENT:   Head: Normocephalic.   Mouth/Throat: Oropharynx is clear and moist.   Eyes: Conjunctivae are normal. Pupils are equal, round, and reactive to light. No scleral icterus.   Neck: Neck supple. No JVD present. No thyromegaly present.   Cardiovascular: Normal rate, regular rhythm and normal heart sounds.    Pulmonary/Chest: Breath sounds normal. No respiratory distress.   Abdominal: Soft. Exhibits no distension.   Musculoskeletal:Exhibits no edema, tenderness or deformity.   Neurological: Alert and oriented to person, place, and time. Exhibits normal muscle tone.   Skin: No ecchymosis, no petechiae and no rash noted. Not diaphoretic. No cyanosis. Nails show no clubbing.   Psychiatric: Normal mood and affect.   Vitals reviewed.      Lab Results   Component Value Date    HGB 12.3 11/01/2019    HCT 39.5 11/01/2019    MCV 83.4 11/01/2019     11/01/2019    WBC 4.30 11/01/2019    NEUTROABS 2.50 11/01/2019    LYMPHSABS 1.50 11/01/2019    MONOSABS 0.40 11/01/2019    EOSABS 0.00 06/30/2018    BASOSABS 0.01 06/30/2018       Lab Results   Component Value Date    GLUCOSE 94 11/01/2019    BUN 15 11/01/2019    CREATININE 0.71 11/01/2019     11/01/2019    K 4.3 11/01/2019     11/01/2019    CO2 29.0 11/01/2019    CALCIUM 8.6 11/01/2019    PROTEINTOT 7.4 07/05/2019    ALBUMIN 4.20 07/05/2019    BILITOT 0.4 07/05/2019    ALKPHOS 113 07/05/2019    AST 22 07/05/2019    ALT 15 07/05/2019         ASSESSMENT " & PLAN:    1. Gastric carcinoma, metastatic  2. Darrel recurrence    Discussion: She is scheduled for CT-guided supraclavicular node biopsy on Friday which we will send for molecular testing and for HER-2/erik testing.  We plan on Xeloda oxaliplatin therapy to begin Monday if we can get a central line in this week, may have to push out chemotherapy start date a little bit.  She has decided on a port versus a PICC line.  She has received chemo education by our pharmacy doctorate.  If pathology returns positive for HER-2/erik I would add Herceptin but I doubt that will be the case given prior testing on her original primary.  I discussed plan of care with the patient, her mother and sister.  We discussed the palliative nature of treatment in metastatic setting.  This is still quite devastating news for all of them.  I offered referral to our counselor, MEHNAZ Edmondson but at this time the patient declined.  All questions were answered to their satisfaction.  I reviewed her PET/CT images with her and her mother.  I will see her back with her second course of therapy and certainly sooner if she has any concerns.    I spent a total of 25 minutes in direct patient care, greater than 15  minutes (greater than 50%) were spent in coordination of care, and counseling the patient regarding  (diagnosis) . Answered any questions patient had regarding medications and plan of care.     MEHNAZ Wright    12/09/2019

## 2019-12-09 NOTE — PROGRESS NOTES
Oral Chemotherapy Teaching      Patient Name/:  Astrid Nobles   1965  Oral Chemotherapy Regimen: Xeloda 1500mg PO BID x 14 days, followed by 7 days off + IV Oxaliplatin on Day 1 of a 21 Day cycle  Date Started Medication: Cycle 1: anticipate 19    Initial Teaching Follow Up Comments     Safety     Storage instructions (away from children; away from heat/cold, sunlight, or moisture), handling - use of gloves (caregivers), washing hands after touching pills, managing waste     “How are you storing your medications?”, reminders on storage, proper handling (caregivers using gloves, washing hands, away from children, managing waste, etc.), disposal of medication with D/C or dosage change    Patient counseled on hazardous handling and storage precautions. Discussed plan to wash hands after handling. Caregivers to handle with latex gloves. Store at room temp, away from pets and children. Pt verbalized understanding.      Adherence      patient and/or caregiver on how to take medication, take with/without food, assess their adherence potential, stress importance of adherence, ways to manage adherence (pill boxes, phone reminders, calendars), what to do if miss a dose   “How are you taking your medication?” “How are you remembering to take your medication?”, “How many doses have you missed?”, determine reasons for non-adherence (not remembering, side effects, etc), ways to improve, overadherence? Remind patient of ways to improve/maintain adherence   Discussed plan for Xeloda 1500mg PO BID x 14 days, followed by 7 days off. Take Xeloda within 30 minutes of meal. Discussed plan to start Xeloda on same day as IV OXaliplatin for each cycle. Currently scheduled to tentatively begin on 19. Provided patient with calendar and print out. Discussed plan for IV Oxaliplatin on Day 1 of each 21 day cycle. Pt has Xeloda in hand today, delivered from [employer].      Side Effects/Adverse Reactions       patient on potential side effects, s/s, ways to manage, when to call MD/seek help     Determine if patient experiencing side effects, ways to manage  Discussed the following side effects including but not limited to: N/V, diarrhea, fatigue, rash, HFS, changes in blood counts, neuropathy, cold induced neuropathy, and elevated bilirubin. Discussed PRN use of zofran and loperamide.      Miscellaneous     Food interactions, DDIs, financial issues Determine if patient started any new medications since being placed on oral chemo (analyze for DDI) Pt has Luis Alfredo in hand during education appt. Plans to discuss with FN for details pertaining to additional assistance in the future.      Additional Notes: Discussed aforementioned material with patient in clinic. All questions and concerns addressed. Provided patient with my contact information and instructions to call should additional questions arise. Obtained signed CCA and consent.

## 2019-12-13 ENCOUNTER — TELEPHONE (OUTPATIENT)
Dept: ONCOLOGY | Facility: CLINIC | Age: 54
End: 2019-12-13

## 2019-12-13 ENCOUNTER — HOSPITAL ENCOUNTER (OUTPATIENT)
Dept: CT IMAGING | Facility: HOSPITAL | Age: 54
End: 2019-12-13

## 2019-12-13 NOTE — TELEPHONE ENCOUNTER
I called and spoke with Astrid.  I explained to her the reasoning for doing the biopsy.  She states that she was still a little confused and just feeling overwhelmed.  She is willing to do the biopsy now.  She wants to reschedule it for next week as she is off of work.  She also wants to hold off on chemotherapy until after the biopsy.  We will get her appointments rescheduled.      ----- Message from Ted Ortiz sent at 12/13/2019  9:28 AM EST -----  Regarding: CANCELLED BX  Pt called scheduling and cancelled her biopsy today.  She stated she didn't have a ride., but she didn't want to r/s.  Just EDILMA BALBUENA

## 2019-12-16 ENCOUNTER — APPOINTMENT (OUTPATIENT)
Dept: ONCOLOGY | Facility: HOSPITAL | Age: 54
End: 2019-12-16

## 2019-12-19 ENCOUNTER — TELEPHONE (OUTPATIENT)
Dept: ONCOLOGY | Facility: CLINIC | Age: 54
End: 2019-12-19

## 2019-12-19 NOTE — NURSING NOTE
Images from PET scan reviewed by radiologists, the area does not appear to be ammenable to CT guided needle biopsy. They would recommend a bronchoscopy. I left messages with Dr Oswald. Message left for patient to call back.

## 2019-12-19 NOTE — TELEPHONE ENCOUNTER
----- Message from Bonnie Joiner sent at 12/19/2019  3:00 PM EST -----  Regarding: [facility]  Contact: [phone]  Deborah with [facility] said the radiologist said it they can't reach the lymph node due to reviewing the images so they can't do the needle biopsy they would recommend scheduling with pulmonary for Bronchoscopy. Patient is scheduled tomorrow and they will call patient to cancel this.

## 2019-12-20 ENCOUNTER — HOSPITAL ENCOUNTER (OUTPATIENT)
Dept: CT IMAGING | Facility: HOSPITAL | Age: 54
Discharge: HOME | End: 2019-12-20

## 2019-12-27 ENCOUNTER — HOSPITAL ENCOUNTER (OUTPATIENT)
Dept: ONCOLOGY | Facility: HOSPITAL | Age: 54
Setting detail: INFUSION SERIES
End: 2019-12-27

## 2019-12-27 ENCOUNTER — OFFICE VISIT (OUTPATIENT)
Dept: ONCOLOGY | Facility: CLINIC | Age: 54
End: 2019-12-27

## 2019-12-27 VITALS
OXYGEN SATURATION: 97 % | SYSTOLIC BLOOD PRESSURE: 118 MMHG | WEIGHT: 157 LBS | HEIGHT: 62 IN | DIASTOLIC BLOOD PRESSURE: 76 MMHG | HEART RATE: 79 BPM | BODY MASS INDEX: 28.89 KG/M2 | RESPIRATION RATE: 16 BRPM | TEMPERATURE: 97.4 F

## 2019-12-27 DIAGNOSIS — C16.2 MALIGNANT NEOPLASM OF BODY OF STOMACH (HCC): Primary | Chronic | ICD-10-CM

## 2019-12-27 PROCEDURE — 99214 OFFICE O/P EST MOD 30 MIN: CPT | Performed by: INTERNAL MEDICINE

## 2019-12-27 NOTE — PROGRESS NOTES
CHIEF COMPLAINT: Darrel recurrence of gastric cancer    Problem List:     Malignant neoplasm of body of stomach (CMS/HCC)    2/6/2018 Initial Diagnosis     Gastric cancer.  53-year-old female with a three-month history of indigestion and upper abdominal pain.  She was started on omeprazole for presumed reflux, symptoms were not relieved.  She underwent an EGD on 2/6/2018 with Dr. Matt that revealed a large, friable and ulcerated mass in the body of the stomach.  Biopsies returned moderately to poorly differentiated adenocarcinoma.  Staging studies including CT scan of the chest abdomen and pelvis were negative for distant disease.  She underwent diagnostic laparoscopic with peritoneal washings to complete staging on 3/13/2018.  Baseline CBC 3/9/2018 WBC 4820, hemoglobin 8.5, hematocrit 28.9%, platelet count 351,000, MCV 64.8, MCH 19.1, MCHC 29.4.  CEA normal at less than 0.50.  HER-2/erik testing from gastric body biopsy was negative.  CMP was normal other than for serum calcium slightly decreased at 8.6.        2/6/2018 Procedure     EGD revealed gastric ulcerated mass in body of stomach.  Pathology returned:   Final Diagnosis    1. GASTRIC BODY BIOPSY:  Invasive moderate to poorly differentiated adenocarcinoma with ulceration (see comment).   2. GASTRIC ANTRUM BIOPSY:  Minimal chronic gastritis without activity.  No intestinal metaplasia or dysplasia identified.  No Helicobacter pylori-like organisms identified on routine stains.   HER2/erik testing negative  FLUORESCENCE IN-SITU HYBRIDIZATION (FISH) REPORT:  Negative/Not Amplified  HER2/WILL-17 Ratio: 1.3        2/9/2018 Imaging     CT abdomen/pelvis IMPRESSION:     There is mild thickening and prominence of the mid gastric wall of the  body of the stomach, middle third.     Extragastric mass is not identified. The lesser sac and retrogastric  spaces are clear.     Visceral tumor, adenopathy, stranding or caking is not currently  identified as described. There is  "no retroperitoneal or retrogastric  adenopathy. The lower lung zones are clear with no evidence of reactive  pleural effusion or occult mass. There is no liver metastasis and no  other acute focal CT abnormality is identified within the abdomen or  pelvis.     Incidental note is made of a nonobstructing stone right kidney right  lower pole.      3/9/2018 Imaging     CT Chest IMPRESSION:  No acute intrathoracic abnormality. No definite evidence of  metastatic disease.      3/13/2018 Procedure     Diagnostic laproscopy with biopsy and lysis of adhesions.  Pathology returned:  Final Diagnosis   PERITONEAL NODULE, EXCISIONAL BIOPSY:  Benign fibrotic nodule. (See comment)  JFJ/klb    Electronically signed by Boaz Jerez MD on 3/15/2018 at 1624   Comment See result below    The biopsy shows a lobulated fibrotic nodule with some intermixed lymphoid cells. The nodule does not appear neoplastic. One cannot entirely exclude a \"burned out\" lymph node, or other implant. There is no evidence of carcinoma.      Abdominal wall washings benign:  Final Diagnosis    1. ABDOMINAL WASH, WALL:  Negative for malignancy.  Scant cellularity; chronic inflammatory cells and scattered benign mesothelial cells.   2. ABDOMINAL WASH, WALL:  Negative for malignancy.  Scant cellularity; chronic inflammatory cells and scattered benign mesothelial cells.   3. ABDOMINAL WASH, WALL:  Negative for malignancy.  Benign mesothelial cells and mixed inflammatory cells.             4/5/2018 - 9/27/2018 Chemotherapy     OP GASTRIC FLOT OXALIplatin / Leucovorin / Fluorouracil/Taxotere  x8      5/9/2018 Genetic Testing     Genetic testing negative for cancer next panel      5/17/2018 Imaging     CT chest abdomen and pelvis showed Shrinkage of the gastric tumor with no evidence of metastasis      6/29/2018 Surgery     Surgery       Procedure:  Subtotal gastrectomy showing 0.7 cm 2 out of 10 node positive moderately differentiated adenocarcinoma of the " stomach.  YpT2 N1 M0 stage IIa      10/29/2018 Imaging     CT chest abdomen pelvis with contrast shows no evidence of metastasis or recurrence but does have bilateral basilar pulmonary infiltrates.  Was started on Levaquin 10/19/18.      4/19/2019 Imaging     CT chest, abdomen and pelvis IMPRESSION:  1. Considerable atelectasis at the lung bases and/or scarring similar to  prior without focal consolidation or effusion.  2. Stable appearance of the abdomen and pelvis without evidence for  recurrent disease; specifically, no new soft tissue nodular enhancement  with stable appearance of the surgical margin subtotal gastrectomy and  patulous appearance of the distal esophagus similar to prior. No new  peritoneal reticulation or fluid.      7/8/2019 Procedure     EGD with Dr. Matt, impression: Esophagitis.  Subtotal gastrectomy with normal-appearing mucosa.      11/8/2019 Imaging     CT chest abdomen pelvis with contrast shows enlarging paratracheal nodes      11/25/2019 Progression     PET/CT IMPRESSION:  There is extensive hypermetabolic activity seen within the  right supraclavicular region, mediastinum and hilar regions as well as  centrally within the celiac axis. Findings all suggesting diffuse  metastatic disease in the lymph nodes within the chest and upper  abdomen.         HISTORY OF PRESENT ILLNESS:  The patient is a 54 y.o. female, here for follow up on management of terrance recurrence of gastric cancer.  Attempt at CT-guided supraclavicular node biopsy per radiologist review of PET scan showed no lesion amenable to CT-guided biopsy and they recommended bronchoscopy perhaps.  She is unwilling to go down that road and does not want the Xeloda oxaliplatin.      Past Medical History:   Diagnosis Date   • Abdominal pain    • Acid reflux    • Arthritis    • Gastric cancer (CMS/HCC)    • Gastric mass    • Gastric ulcer    • History of transfusion     no reaction 2018   • Wears glasses      Past Surgical History:    Procedure Laterality Date   • CERVICAL BIOPSY  W/ LOOP ELECTRODE EXCISION     • DIAGNOSTIC LAPAROSCOPY N/A 3/13/2018    Procedure: DIAGNOSTIC LAPAROSCOPY WITH BIOPSY, LYSIS OF ADHESIONS;  Surgeon: Nicole Crooks MD;  Location: [facility] OR;  Service: General   • ENDOSCOPY     • GASTRECTOMY N/A 6/29/2018    Procedure: SUBTOTAL GASTRECTOMY;  Surgeon: Nicole Crooks MD;  Location:  [facility] OR;  Service: General   • STOMACH SURGERY  06/09/2018    For removal of mass   • TUBAL ABDOMINAL LIGATION     • VENOUS ACCESS DEVICE (PORT) INSERTION     • VENOUS ACCESS DEVICE (PORT) REMOVAL N/A 12/21/2018    Procedure: PORT REMOVAL;  Surgeon: Nicole Crooks MD;  Location:  [facility] OR;  Service: General       No Known Allergies    Family History and Social History reviewed and changed as necessary      REVIEW OF SYSTEM:   Review of Systems   Constitutional: Negative for appetite change, chills, diaphoresis, fatigue, fever and unexpected weight change.   HENT:   Negative for mouth sores, sore throat and trouble swallowing.    Eyes: Negative for icterus.   Respiratory: Negative for cough, hemoptysis and shortness of breath.    Cardiovascular: Negative for chest pain, leg swelling and palpitations.   Gastrointestinal: Negative for abdominal distention, abdominal pain, blood in stool, constipation, diarrhea, nausea and vomiting.   Endocrine: Negative for hot flashes.   Genitourinary: Negative for bladder incontinence, difficulty urinating, dysuria, frequency and hematuria.    Musculoskeletal: Negative for gait problem, neck pain and neck stiffness.   Skin: Negative for rash.   Neurological: Negative for dizziness, gait problem, headaches, light-headedness and numbness.   Hematological: Negative for adenopathy. Does not bruise/bleed easily.   Psychiatric/Behavioral: Negative for depression. The patient is not nervous/anxious.    All other systems reviewed and are negative.       PHYSICAL EXAM    Vitals:    12/27/19 1156   BP: 118/76   Pulse:  "79   Resp: 16   Temp: 97.4 °F (36.3 °C)   SpO2: 97%   Weight: 71.2 kg (157 lb)   Height: 157.5 cm (62\")     Constitutional: Appears well-developed and well-nourished. No distress.   ECOG: (0) Fully active, able to carry on all predisease performance without restriction  HENT:   Head: Normocephalic.   Mouth/Throat: Oropharynx is clear and moist.   Eyes: Conjunctivae are normal. Pupils are equal, round, and reactive to light. No scleral icterus.   Neck: Neck supple. No JVD present. No thyromegaly present.   Cardiovascular: Normal rate, regular rhythm and normal heart sounds.    Pulmonary/Chest: Breath sounds normal. No respiratory distress.   Abdominal: Soft. Exhibits no distension and no mass. There is no hepatosplenomegaly. There is no tenderness. There is no rebound and no guarding.   Musculoskeletal:Exhibits no edema, tenderness or deformity.   Neurological: Alert and oriented to person, place, and time. Exhibits normal muscle tone.   Skin: No ecchymosis, no petechiae and no rash noted. Not diaphoretic. No cyanosis. Nails show no clubbing.   Psychiatric: Normal mood and affect.   Vitals reviewed.      Lab Results   Component Value Date    HGB 12.3 11/01/2019    HCT 39.5 11/01/2019    MCV 83.4 11/01/2019     11/01/2019    WBC 4.30 11/01/2019    NEUTROABS 2.50 11/01/2019    LYMPHSABS 1.50 11/01/2019    MONOSABS 0.40 11/01/2019    EOSABS 0.00 06/30/2018    BASOSABS 0.01 06/30/2018       Lab Results   Component Value Date    GLUCOSE 94 11/01/2019    BUN 15 11/01/2019    CREATININE 0.71 11/01/2019     11/01/2019    K 4.3 11/01/2019     11/01/2019    CO2 29.0 11/01/2019    CALCIUM 8.6 11/01/2019    PROTEINTOT 7.4 07/05/2019    ALBUMIN 4.20 07/05/2019    BILITOT 0.4 07/05/2019    ALKPHOS 113 07/05/2019    AST 22 07/05/2019    ALT 15 07/05/2019                   ASSESSMENT & PLAN:    1. Darrel recurrence of gastric cancer    Discussion: She is unwilling to try the Xeloda oxaliplatin.  I recommended " Irinotecan.  Does not want that.  Cannot get Keytruda unless the CPS score is over 1 and we will try to get that done on her original biopsy if there is enough tissue.  If there is not enough tissue we will send molecular testing by dylan Padilla.  We will see her back in a few weeks to see with the molecular testing has showed.  Discussed with patient 30 minutes face-to-face greater than 50% spent counseling.      Kaden Oswald MD    12/27/2019

## 2020-01-21 ENCOUNTER — DOCUMENTATION (OUTPATIENT)
Dept: ONCOLOGY | Facility: CLINIC | Age: 55
End: 2020-01-21

## 2020-01-21 NOTE — PROGRESS NOTES
Peer to peer done for Keytruda, denied as this would be second line therapy for her and she is not MSI high or dMMR.  Would be approved if third line or subsequent therapy.

## 2020-01-24 ENCOUNTER — OFFICE VISIT (OUTPATIENT)
Dept: ONCOLOGY | Facility: CLINIC | Age: 55
End: 2020-01-24

## 2020-01-24 VITALS
HEIGHT: 62 IN | RESPIRATION RATE: 16 BRPM | DIASTOLIC BLOOD PRESSURE: 80 MMHG | OXYGEN SATURATION: 99 % | SYSTOLIC BLOOD PRESSURE: 144 MMHG | HEART RATE: 64 BPM | WEIGHT: 151 LBS | TEMPERATURE: 97.4 F | BODY MASS INDEX: 27.79 KG/M2

## 2020-01-24 DIAGNOSIS — C16.2 MALIGNANT NEOPLASM OF BODY OF STOMACH (HCC): Primary | Chronic | ICD-10-CM

## 2020-01-24 PROCEDURE — 99215 OFFICE O/P EST HI 40 MIN: CPT | Performed by: INTERNAL MEDICINE

## 2020-01-24 RX ORDER — SODIUM CHLORIDE 9 MG/ML
250 INJECTION, SOLUTION INTRAVENOUS ONCE
Status: CANCELLED | OUTPATIENT
Start: 2020-02-07

## 2020-01-24 NOTE — PROGRESS NOTES
CHIEF COMPLAINT: Darrel recurrence of gastric carcinoma    Problem List:     Malignant neoplasm of body of stomach (CMS/HCC)    2/6/2018 Initial Diagnosis     Gastric cancer.  53-year-old female with a three-month history of indigestion and upper abdominal pain.  She was started on omeprazole for presumed reflux, symptoms were not relieved.  She underwent an EGD on 2/6/2018 with Dr. Matt that revealed a large, friable and ulcerated mass in the body of the stomach.  Biopsies returned moderately to poorly differentiated adenocarcinoma.  Staging studies including CT scan of the chest abdomen and pelvis were negative for distant disease.  She underwent diagnostic laparoscopic with peritoneal washings to complete staging on 3/13/2018.  Baseline CBC 3/9/2018 WBC 4820, hemoglobin 8.5, hematocrit 28.9%, platelet count 351,000, MCV 64.8, MCH 19.1, MCHC 29.4.  CEA normal at less than 0.50.  HER-2/erik testing from gastric body biopsy was negative.  CMP was normal other than for serum calcium slightly decreased at 8.6.        2/6/2018 Procedure     EGD revealed gastric ulcerated mass in body of stomach.  Pathology returned:   Final Diagnosis    1. GASTRIC BODY BIOPSY:  Invasive moderate to poorly differentiated adenocarcinoma with ulceration (see comment).   2. GASTRIC ANTRUM BIOPSY:  Minimal chronic gastritis without activity.  No intestinal metaplasia or dysplasia identified.  No Helicobacter pylori-like organisms identified on routine stains.   HER2/erik testing negative  FLUORESCENCE IN-SITU HYBRIDIZATION (FISH) REPORT:  Negative/Not Amplified  HER2/WILL-17 Ratio: 1.3        2/9/2018 Imaging     CT abdomen/pelvis IMPRESSION:     There is mild thickening and prominence of the mid gastric wall of the  body of the stomach, middle third.     Extragastric mass is not identified. The lesser sac and retrogastric  spaces are clear.     Visceral tumor, adenopathy, stranding or caking is not currently  identified as described. There  "is no retroperitoneal or retrogastric  adenopathy. The lower lung zones are clear with no evidence of reactive  pleural effusion or occult mass. There is no liver metastasis and no  other acute focal CT abnormality is identified within the abdomen or  pelvis.     Incidental note is made of a nonobstructing stone right kidney right  lower pole.      3/9/2018 Imaging     CT Chest IMPRESSION:  No acute intrathoracic abnormality. No definite evidence of  metastatic disease.      3/13/2018 Procedure     Diagnostic laproscopy with biopsy and lysis of adhesions.  Pathology returned:  Final Diagnosis   PERITONEAL NODULE, EXCISIONAL BIOPSY:  Benign fibrotic nodule. (See comment)  JFJ/klb    Electronically signed by Boaz Jerez MD on 3/15/2018 at 1624   Comment See result below    The biopsy shows a lobulated fibrotic nodule with some intermixed lymphoid cells. The nodule does not appear neoplastic. One cannot entirely exclude a \"burned out\" lymph node, or other implant. There is no evidence of carcinoma.      Abdominal wall washings benign:  Final Diagnosis    1. ABDOMINAL WASH, WALL:  Negative for malignancy.  Scant cellularity; chronic inflammatory cells and scattered benign mesothelial cells.   2. ABDOMINAL WASH, WALL:  Negative for malignancy.  Scant cellularity; chronic inflammatory cells and scattered benign mesothelial cells.   3. ABDOMINAL WASH, WALL:  Negative for malignancy.  Benign mesothelial cells and mixed inflammatory cells.             4/5/2018 - 9/27/2018 Chemotherapy     OP GASTRIC FLOT OXALIplatin / Leucovorin / Fluorouracil/Taxotere  x8      5/9/2018 Genetic Testing     Genetic testing negative for cancer next panel      5/17/2018 Imaging     CT chest abdomen and pelvis showed Shrinkage of the gastric tumor with no evidence of metastasis      6/29/2018 Surgery     Surgery       Procedure:  Subtotal gastrectomy showing 0.7 cm 2 out of 10 node positive moderately differentiated adenocarcinoma of the " stomach.  YpT2 N1 M0 stage IIa      10/29/2018 Imaging     CT chest abdomen pelvis with contrast shows no evidence of metastasis or recurrence but does have bilateral basilar pulmonary infiltrates.  Was started on Levaquin 10/19/18.      4/19/2019 Imaging     CT chest, abdomen and pelvis IMPRESSION:  1. Considerable atelectasis at the lung bases and/or scarring similar to  prior without focal consolidation or effusion.  2. Stable appearance of the abdomen and pelvis without evidence for  recurrent disease; specifically, no new soft tissue nodular enhancement  with stable appearance of the surgical margin subtotal gastrectomy and  patulous appearance of the distal esophagus similar to prior. No new  peritoneal reticulation or fluid.      7/8/2019 Procedure     EGD with Dr. Matt, impression: Esophagitis.  Subtotal gastrectomy with normal-appearing mucosa.      11/8/2019 Imaging     CT chest abdomen pelvis with contrast shows enlarging paratracheal nodes      11/25/2019 Progression     PET/CT IMPRESSION:  There is extensive hypermetabolic activity seen within the  right supraclavicular region, mediastinum and hilar regions as well as  centrally within the celiac axis. Findings all suggesting diffuse  metastatic disease in the lymph nodes within the chest and upper  abdomen.      1/27/2020 -  Chemotherapy     OP gastric pembrolizumab         HISTORY OF PRESENT ILLNESS:  The patient is a 54 y.o. female, here for follow up on management of terrance recurrence of gastric carcinoma.  Her last scan was the end of November and she has been contemplating her options.  In the meantime we sent molecular testing which showed CPS score of 5 suggesting benefit of Keytruda.  I brought her back in to discuss this option.      Past Medical History:   Diagnosis Date   • Abdominal pain    • Acid reflux    • Arthritis    • Gastric cancer (CMS/HCC)    • Gastric mass    • Gastric ulcer    • History of transfusion     no reaction 2018   •  Wears glasses      Past Surgical History:   Procedure Laterality Date   • CERVICAL BIOPSY  W/ LOOP ELECTRODE EXCISION     • DIAGNOSTIC LAPAROSCOPY N/A 3/13/2018    Procedure: DIAGNOSTIC LAPAROSCOPY WITH BIOPSY, LYSIS OF ADHESIONS;  Surgeon: Nicole Crooks MD;  Location:  [facility];  Service: General   • ENDOSCOPY     • GASTRECTOMY N/A 6/29/2018    Procedure: SUBTOTAL GASTRECTOMY;  Surgeon: Nicole Crooks MD;  Location:  [facility];  Service: General   • STOMACH SURGERY  06/09/2018    For removal of mass   • TUBAL ABDOMINAL LIGATION     • VENOUS ACCESS DEVICE (PORT) INSERTION     • VENOUS ACCESS DEVICE (PORT) REMOVAL N/A 12/21/2018    Procedure: PORT REMOVAL;  Surgeon: Nicole Crooks MD;  Location:  [facility];  Service: General       No Known Allergies    Family History and Social History reviewed and changed as necessary      REVIEW OF SYSTEM:   Review of Systems   Constitutional: Negative for appetite change, chills, diaphoresis, fatigue, fever and unexpected weight change.   HENT:   Negative for mouth sores, sore throat and trouble swallowing.    Eyes: Negative for icterus.   Respiratory: Negative for cough, hemoptysis and shortness of breath.    Cardiovascular: Negative for chest pain, leg swelling and palpitations.   Gastrointestinal: Negative for abdominal distention, abdominal pain, blood in stool, constipation, diarrhea, nausea and vomiting.   Endocrine: Negative for hot flashes.   Genitourinary: Negative for bladder incontinence, difficulty urinating, dysuria, frequency and hematuria.    Musculoskeletal: Negative for gait problem, neck pain and neck stiffness.   Skin: Negative for rash.   Neurological: Negative for dizziness, gait problem, headaches, light-headedness and numbness.   Hematological: Negative for adenopathy. Does not bruise/bleed easily.   Psychiatric/Behavioral: Negative for depression. The patient is not nervous/anxious.    All other systems reviewed and are negative.       PHYSICAL  "EXAM    Vitals:    01/24/20 1530   BP: 144/80   Pulse: 64   Resp: 16   Temp: 97.4 °F (36.3 °C)   SpO2: 99%   Weight: 68.5 kg (151 lb)   Height: 157.5 cm (62\")     Constitutional: Appears well-developed and well-nourished. No distress.   ECOG: (0) Fully active, able to carry on all predisease performance without restriction  HENT:   Head: Normocephalic.   Mouth/Throat: Oropharynx is clear and moist.   Eyes: Conjunctivae are normal. Pupils are equal, round, and reactive to light. No scleral icterus.   Neck: Neck supple. No JVD present. No thyromegaly present.   Cardiovascular: Normal rate, regular rhythm and normal heart sounds.    Pulmonary/Chest: Breath sounds normal. No respiratory distress.   Abdominal: Soft. Exhibits no distension and no mass. There is no hepatosplenomegaly. There is no tenderness. There is no rebound and no guarding.   Musculoskeletal:Exhibits no edema, tenderness or deformity.   Neurological: Alert and oriented to person, place, and time. Exhibits normal muscle tone.   Skin: No ecchymosis, no petechiae and no rash noted. Not diaphoretic. No cyanosis. Nails show no clubbing.   Psychiatric: Normal mood and affect.   Vitals reviewed.      Lab Results   Component Value Date    HGB 12.3 11/01/2019    HCT 39.5 11/01/2019    MCV 83.4 11/01/2019     11/01/2019    WBC 4.30 11/01/2019    NEUTROABS 2.50 11/01/2019    LYMPHSABS 1.50 11/01/2019    MONOSABS 0.40 11/01/2019    EOSABS 0.00 06/30/2018    BASOSABS 0.01 06/30/2018       Lab Results   Component Value Date    GLUCOSE 94 11/01/2019    BUN 15 11/01/2019    CREATININE 0.71 11/01/2019     11/01/2019    K 4.3 11/01/2019     11/01/2019    CO2 29.0 11/01/2019    CALCIUM 8.6 11/01/2019    PROTEINTOT 7.4 07/05/2019    ALBUMIN 4.20 07/05/2019    BILITOT 0.4 07/05/2019    ALKPHOS 113 07/05/2019    AST 22 07/05/2019    ALT 15 07/05/2019                   ASSESSMENT & PLAN:    1. Metastatic gastric carcinoma  2. Depression    Discussion: She " has a CPS score of 5.  Typically Keytruda would be given third line but the patient is refusing second line chemotherapy.  We will consider applying to the company for Keytruda but the patient still is not sure at this junction whether she wants to try.  In the meantime I will set her up to see me back in 2 weeks with a repeat CT chest abdomen pelvis in the interim to reestablish her baseline before resuming treatment and to get her mind around whether this is still a persistent issue.  She is a little bit in denial and certainly depressed over the situation and is completely against the thought of any chemotherapy.  I discussed with the patient and her family for 45 minutes face-to-face greater than 50% spent counseling regarding this complex decision tree.      Kaden Oswald MD    01/24/2020

## 2020-01-31 ENCOUNTER — LAB (OUTPATIENT)
Dept: LAB | Facility: HOSPITAL | Age: 55
End: 2020-01-31

## 2020-01-31 ENCOUNTER — HOSPITAL ENCOUNTER (OUTPATIENT)
Dept: CT IMAGING | Facility: HOSPITAL | Age: 55
Discharge: HOME | End: 2020-01-31
Admitting: INTERNAL MEDICINE

## 2020-01-31 DIAGNOSIS — C16.2 MALIGNANT NEOPLASM OF BODY OF STOMACH (HCC): ICD-10-CM

## 2020-01-31 DIAGNOSIS — C16.2 MALIGNANT NEOPLASM OF BODY OF STOMACH (HCC): Chronic | ICD-10-CM

## 2020-01-31 LAB
ALBUMIN SERPL-MCNC: 4.3 G/DL (ref 3.5–5.2)
ALBUMIN/GLOB SERPL: 1.5 G/DL
ALP SERPL-CCNC: 64 U/L (ref 39–117)
ALT SERPL W P-5'-P-CCNC: 14 U/L (ref 1–33)
ANION GAP SERPL CALCULATED.3IONS-SCNC: 13 MMOL/L (ref 5–15)
AST SERPL-CCNC: 19 U/L (ref 1–32)
BASOPHILS # BLD AUTO: 0.03 10*3/MM3 (ref 0–0.2)
BASOPHILS NFR BLD AUTO: 0.8 % (ref 0–1.5)
BILIRUB SERPL-MCNC: 0.4 MG/DL (ref 0.2–1.2)
BUN BLD-MCNC: 8 MG/DL (ref 6–20)
BUN/CREAT SERPL: 11.3 (ref 7–25)
CALCIUM SPEC-SCNC: 9.2 MG/DL (ref 8.6–10.5)
CHLORIDE SERPL-SCNC: 103 MMOL/L (ref 98–107)
CO2 SERPL-SCNC: 25 MMOL/L (ref 22–29)
CREAT BLD-MCNC: 0.71 MG/DL (ref 0.57–1)
CREAT BLDA-MCNC: 0.6 MG/DL (ref 0.6–1.3)
DEPRECATED RDW RBC AUTO: 53.3 FL (ref 37–54)
EOSINOPHIL # BLD AUTO: 0.09 10*3/MM3 (ref 0–0.4)
EOSINOPHIL NFR BLD AUTO: 2.4 % (ref 0.3–6.2)
ERYTHROCYTE [DISTWIDTH] IN BLOOD BY AUTOMATED COUNT: 17.2 % (ref 12.3–15.4)
GFR SERPL CREATININE-BSD FRML MDRD: 104 ML/MIN/1.73
GLOBULIN UR ELPH-MCNC: 2.8 GM/DL
GLUCOSE BLD-MCNC: 85 MG/DL (ref 65–99)
HCT VFR BLD AUTO: 39.9 % (ref 34–46.6)
HGB BLD-MCNC: 12.3 G/DL (ref 12–15.9)
IMM GRANULOCYTES # BLD AUTO: 0 10*3/MM3 (ref 0–0.05)
IMM GRANULOCYTES NFR BLD AUTO: 0 % (ref 0–0.5)
LYMPHOCYTES # BLD AUTO: 1.19 10*3/MM3 (ref 0.7–3.1)
LYMPHOCYTES NFR BLD AUTO: 31.2 % (ref 19.6–45.3)
MCH RBC QN AUTO: 26.3 PG (ref 26.6–33)
MCHC RBC AUTO-ENTMCNC: 30.8 G/DL (ref 31.5–35.7)
MCV RBC AUTO: 85.3 FL (ref 79–97)
MONOCYTES # BLD AUTO: 0.37 10*3/MM3 (ref 0.1–0.9)
MONOCYTES NFR BLD AUTO: 9.7 % (ref 5–12)
NEUTROPHILS # BLD AUTO: 2.13 10*3/MM3 (ref 1.7–7)
NEUTROPHILS NFR BLD AUTO: 55.9 % (ref 42.7–76)
NRBC BLD AUTO-RTO: 0 /100 WBC (ref 0–0.2)
PLATELET # BLD AUTO: 208 10*3/MM3 (ref 140–450)
PMV BLD AUTO: 12 FL (ref 6–12)
POTASSIUM BLD-SCNC: 4.2 MMOL/L (ref 3.5–5.2)
PROT SERPL-MCNC: 7.1 G/DL (ref 6–8.5)
RBC # BLD AUTO: 4.68 10*6/MM3 (ref 3.77–5.28)
SODIUM BLD-SCNC: 141 MMOL/L (ref 136–145)
T4 FREE SERPL-MCNC: 0.93 NG/DL (ref 0.93–1.7)
TSH SERPL DL<=0.05 MIU/L-ACNC: 2.86 UIU/ML (ref 0.27–4.2)
WBC NRBC COR # BLD: 3.81 10*3/MM3 (ref 3.4–10.8)

## 2020-01-31 PROCEDURE — 71260 CT THORAX DX C+: CPT

## 2020-01-31 PROCEDURE — 85025 COMPLETE CBC W/AUTO DIFF WBC: CPT

## 2020-01-31 PROCEDURE — 82565 ASSAY OF CREATININE: CPT

## 2020-01-31 PROCEDURE — 80053 COMPREHEN METABOLIC PANEL: CPT

## 2020-01-31 PROCEDURE — 84439 ASSAY OF FREE THYROXINE: CPT

## 2020-01-31 PROCEDURE — 74177 CT ABD & PELVIS W/CONTRAST: CPT

## 2020-01-31 PROCEDURE — 84443 ASSAY THYROID STIM HORMONE: CPT

## 2020-01-31 PROCEDURE — 25010000002 IOPAMIDOL 61 % SOLUTION: Performed by: INTERNAL MEDICINE

## 2020-01-31 PROCEDURE — 36415 COLL VENOUS BLD VENIPUNCTURE: CPT

## 2020-01-31 RX ADMIN — BARIUM SULFATE 450 ML: 21 SUSPENSION ORAL at 12:45

## 2020-01-31 RX ADMIN — IOPAMIDOL 85 ML: 612 INJECTION, SOLUTION INTRAVENOUS at 13:49

## 2020-02-03 LAB
CYTO UR: NORMAL
LAB AP CARIS, ADDENDUM: NORMAL
LAB AP CASE REPORT: NORMAL
LAB AP CLINICAL INFORMATION: NORMAL
Lab: NORMAL
PATH REPORT.FINAL DX SPEC: NORMAL
PATH REPORT.GROSS SPEC: NORMAL

## 2020-02-07 ENCOUNTER — OFFICE VISIT (OUTPATIENT)
Dept: ONCOLOGY | Facility: CLINIC | Age: 55
End: 2020-02-07

## 2020-02-07 VITALS
BODY MASS INDEX: 27.05 KG/M2 | DIASTOLIC BLOOD PRESSURE: 80 MMHG | HEIGHT: 62 IN | HEART RATE: 62 BPM | TEMPERATURE: 98.4 F | OXYGEN SATURATION: 96 % | RESPIRATION RATE: 16 BRPM | SYSTOLIC BLOOD PRESSURE: 126 MMHG | WEIGHT: 147 LBS

## 2020-02-07 DIAGNOSIS — C16.2 MALIGNANT NEOPLASM OF BODY OF STOMACH (HCC): Primary | Chronic | ICD-10-CM

## 2020-02-07 PROCEDURE — 99214 OFFICE O/P EST MOD 30 MIN: CPT | Performed by: INTERNAL MEDICINE

## 2020-02-07 NOTE — PROGRESS NOTES
CHIEF COMPLAINT: Gastric carcinoma    Problem List:     Malignant neoplasm of body of stomach (CMS/HCC)    2/6/2018 Initial Diagnosis     Gastric cancer.  53-year-old female with a three-month history of indigestion and upper abdominal pain.  She was started on omeprazole for presumed reflux, symptoms were not relieved.  She underwent an EGD on 2/6/2018 with Dr. Matt that revealed a large, friable and ulcerated mass in the body of the stomach.  Biopsies returned moderately to poorly differentiated adenocarcinoma.  Staging studies including CT scan of the chest abdomen and pelvis were negative for distant disease.  She underwent diagnostic laparoscopic with peritoneal washings to complete staging on 3/13/2018.  Baseline CBC 3/9/2018 WBC 4820, hemoglobin 8.5, hematocrit 28.9%, platelet count 351,000, MCV 64.8, MCH 19.1, MCHC 29.4.  CEA normal at less than 0.50.  HER-2/erik testing from gastric body biopsy was negative.  CMP was normal other than for serum calcium slightly decreased at 8.6.        2/6/2018 Procedure     EGD revealed gastric ulcerated mass in body of stomach.  Pathology returned:   Final Diagnosis    1. GASTRIC BODY BIOPSY:  Invasive moderate to poorly differentiated adenocarcinoma with ulceration (see comment).   2. GASTRIC ANTRUM BIOPSY:  Minimal chronic gastritis without activity.  No intestinal metaplasia or dysplasia identified.  No Helicobacter pylori-like organisms identified on routine stains.   HER2/erik testing negative  FLUORESCENCE IN-SITU HYBRIDIZATION (FISH) REPORT:  Negative/Not Amplified  HER2/WILL-17 Ratio: 1.3        2/9/2018 Imaging     CT abdomen/pelvis IMPRESSION:     There is mild thickening and prominence of the mid gastric wall of the  body of the stomach, middle third.     Extragastric mass is not identified. The lesser sac and retrogastric  spaces are clear.     Visceral tumor, adenopathy, stranding or caking is not currently  identified as described. There is no  "retroperitoneal or retrogastric  adenopathy. The lower lung zones are clear with no evidence of reactive  pleural effusion or occult mass. There is no liver metastasis and no  other acute focal CT abnormality is identified within the abdomen or  pelvis.     Incidental note is made of a nonobstructing stone right kidney right  lower pole.      3/9/2018 Imaging     CT Chest IMPRESSION:  No acute intrathoracic abnormality. No definite evidence of  metastatic disease.      3/13/2018 Procedure     Diagnostic laproscopy with biopsy and lysis of adhesions.  Pathology returned:  Final Diagnosis   PERITONEAL NODULE, EXCISIONAL BIOPSY:  Benign fibrotic nodule. (See comment)  JFJ/klb    Electronically signed by Boaz Jerez MD on 3/15/2018 at 1624   Comment See result below    The biopsy shows a lobulated fibrotic nodule with some intermixed lymphoid cells. The nodule does not appear neoplastic. One cannot entirely exclude a \"burned out\" lymph node, or other implant. There is no evidence of carcinoma.      Abdominal wall washings benign:  Final Diagnosis    1. ABDOMINAL WASH, WALL:  Negative for malignancy.  Scant cellularity; chronic inflammatory cells and scattered benign mesothelial cells.   2. ABDOMINAL WASH, WALL:  Negative for malignancy.  Scant cellularity; chronic inflammatory cells and scattered benign mesothelial cells.   3. ABDOMINAL WASH, WALL:  Negative for malignancy.  Benign mesothelial cells and mixed inflammatory cells.             4/5/2018 - 9/27/2018 Chemotherapy     OP GASTRIC FLOT OXALIplatin / Leucovorin / Fluorouracil/Taxotere  x8      5/9/2018 Genetic Testing     Genetic testing negative for cancer next panel      5/17/2018 Imaging     CT chest abdomen and pelvis showed Shrinkage of the gastric tumor with no evidence of metastasis      6/29/2018 Surgery     Surgery       Procedure:  Subtotal gastrectomy showing 0.7 cm 2 out of 10 node positive moderately differentiated adenocarcinoma of the stomach.  " YpT2 N1 M0 stage IIa      10/29/2018 Imaging     CT chest abdomen pelvis with contrast shows no evidence of metastasis or recurrence but does have bilateral basilar pulmonary infiltrates.  Was started on Levaquin 10/19/18.      4/19/2019 Imaging     CT chest, abdomen and pelvis IMPRESSION:  1. Considerable atelectasis at the lung bases and/or scarring similar to  prior without focal consolidation or effusion.  2. Stable appearance of the abdomen and pelvis without evidence for  recurrent disease; specifically, no new soft tissue nodular enhancement  with stable appearance of the surgical margin subtotal gastrectomy and  patulous appearance of the distal esophagus similar to prior. No new  peritoneal reticulation or fluid.      7/8/2019 Procedure     EGD with Dr. Matt, impression: Esophagitis.  Subtotal gastrectomy with normal-appearing mucosa.      11/8/2019 Imaging     CT chest abdomen pelvis with contrast shows enlarging paratracheal nodes      11/25/2019 Progression     PET/CT IMPRESSION:  There is extensive hypermetabolic activity seen within the  right supraclavicular region, mediastinum and hilar regions as well as  centrally within the celiac axis. Findings all suggesting diffuse  metastatic disease in the lymph nodes within the chest and upper  abdomen.      2/7/2020 -  Chemotherapy     OP gastric pembrolizumab         HISTORY OF PRESENT ILLNESS:  The patient is a 54 y.o. female, here for follow up on management of gastric carcinoma.  While the nodes are still present, they have become less prominent and shrunk on CT.      Past Medical History:   Diagnosis Date   • Abdominal pain    • Acid reflux    • Arthritis    • Gastric cancer (CMS/HCC)    • Gastric mass    • Gastric ulcer    • History of transfusion     no reaction 2018   • Wears glasses      Past Surgical History:   Procedure Laterality Date   • CERVICAL BIOPSY  W/ LOOP ELECTRODE EXCISION     • DIAGNOSTIC LAPAROSCOPY N/A 3/13/2018    Procedure:  "DIAGNOSTIC LAPAROSCOPY WITH BIOPSY, LYSIS OF ADHESIONS;  Surgeon: Nicole Crooks MD;  Location:  [facility];  Service: General   • ENDOSCOPY     • GASTRECTOMY N/A 6/29/2018    Procedure: SUBTOTAL GASTRECTOMY;  Surgeon: Nicole Crooks MD;  Location:  [facility];  Service: General   • STOMACH SURGERY  06/09/2018    For removal of mass   • TUBAL ABDOMINAL LIGATION     • VENOUS ACCESS DEVICE (PORT) INSERTION     • VENOUS ACCESS DEVICE (PORT) REMOVAL N/A 12/21/2018    Procedure: PORT REMOVAL;  Surgeon: Nicole Crooks MD;  Location:  [facility];  Service: General       No Known Allergies    Family History and Social History reviewed and changed as necessary      REVIEW OF SYSTEM:   Review of Systems   Constitutional: Negative for appetite change, chills, diaphoresis, fatigue, fever and unexpected weight change.   HENT:   Negative for mouth sores, sore throat and trouble swallowing.    Eyes: Negative for icterus.   Respiratory: Negative for cough, hemoptysis and shortness of breath.    Cardiovascular: Negative for chest pain, leg swelling and palpitations.   Gastrointestinal: Negative for abdominal distention, abdominal pain, blood in stool, constipation, diarrhea, nausea and vomiting.   Endocrine: Negative for hot flashes.   Genitourinary: Negative for bladder incontinence, difficulty urinating, dysuria, frequency and hematuria.    Musculoskeletal: Negative for gait problem, neck pain and neck stiffness.   Skin: Negative for rash.   Neurological: Negative for dizziness, gait problem, headaches, light-headedness and numbness.   Hematological: Negative for adenopathy. Does not bruise/bleed easily.   Psychiatric/Behavioral: Negative for depression. The patient is not nervous/anxious.    All other systems reviewed and are negative.       PHYSICAL EXAM    Vitals:    02/07/20 1536   BP: 126/80   Pulse: 62   Resp: 16   Temp: 98.4 °F (36.9 °C)   SpO2: 96%   Weight: 66.7 kg (147 lb)   Height: 157.5 cm (62\")     Constitutional: Appears " well-developed and well-nourished. No distress.   ECOG: (0) Fully Active - Able to Carry On All Pre-disease Performance Without Restriction  HENT:   Head: Normocephalic.   Mouth/Throat: Oropharynx is clear and moist.   Eyes: Conjunctivae are normal. Pupils are equal, round, and reactive to light. No scleral icterus.   Neck: Neck supple. No JVD present. No thyromegaly present.   Cardiovascular: Normal rate, regular rhythm and normal heart sounds.    Pulmonary/Chest: Breath sounds normal. No respiratory distress.   Abdominal: Soft. Exhibits no distension and no mass. There is no hepatosplenomegaly. There is no tenderness. There is no rebound and no guarding.   Musculoskeletal:Exhibits no edema, tenderness or deformity.   Neurological: Alert and oriented to person, place, and time. Exhibits normal muscle tone.   Skin: No ecchymosis, no petechiae and no rash noted. Not diaphoretic. No cyanosis. Nails show no clubbing.   Psychiatric: Normal mood and affect.   Vitals reviewed.      Lab Results   Component Value Date    HGB 12.3 01/31/2020    HCT 39.9 01/31/2020    MCV 85.3 01/31/2020     01/31/2020    WBC 3.81 01/31/2020    NEUTROABS 2.13 01/31/2020    LYMPHSABS 1.19 01/31/2020    MONOSABS 0.37 01/31/2020    EOSABS 0.09 01/31/2020    BASOSABS 0.03 01/31/2020       Lab Results   Component Value Date    GLUCOSE 85 01/31/2020    BUN 8 01/31/2020    CREATININE 0.71 01/31/2020     01/31/2020    K 4.2 01/31/2020     01/31/2020    CO2 25.0 01/31/2020    CALCIUM 9.2 01/31/2020    PROTEINTOT 7.1 01/31/2020    ALBUMIN 4.30 01/31/2020    BILITOT 0.4 01/31/2020    ALKPHOS 64 01/31/2020    AST 19 01/31/2020    ALT 14 01/31/2020                   ASSESSMENT & PLAN:    1. History of gastric carcinoma with subsequent terrance recurrence non-biopsy proven thus far: Her scans look better.  She is unconvinced of the need for systemic therapy.  We will take the Keytruda orders out and repeat her CAT scans prior to return in 3  months.  Discussed this with the patient in detail for 30 minutes greater than 50% spent counseling regarding this plan as outlined above.      Kaden Oswald MD    02/07/2020

## 2020-04-06 ENCOUNTER — TELEPHONE (OUTPATIENT)
Dept: FAMILY MEDICINE CLINIC | Facility: CLINIC | Age: 55
End: 2020-04-06

## 2020-04-06 NOTE — TELEPHONE ENCOUNTER
PT CALLING IN TO SEE IF SHE HAS HAD A HEP C SHOT BEFORE.  PT DERMATOLOGIST RECOMMENDED FOR THE PT TO FIND OUT IF SHE HAS HAD THAT VACCINATION.    PLEASE CALL AND ADVISE [phone]

## 2020-05-01 ENCOUNTER — HOSPITAL ENCOUNTER (OUTPATIENT)
Dept: CT IMAGING | Facility: HOSPITAL | Age: 55
Discharge: HOME | End: 2020-05-01
Admitting: INTERNAL MEDICINE

## 2020-05-01 DIAGNOSIS — C16.2 MALIGNANT NEOPLASM OF BODY OF STOMACH (HCC): Chronic | ICD-10-CM

## 2020-05-01 LAB — CREAT BLDA-MCNC: 0.9 MG/DL (ref 0.6–1.3)

## 2020-05-01 PROCEDURE — 74177 CT ABD & PELVIS W/CONTRAST: CPT

## 2020-05-01 PROCEDURE — 25010000002 IOPAMIDOL 61 % SOLUTION: Performed by: INTERNAL MEDICINE

## 2020-05-01 PROCEDURE — 71260 CT THORAX DX C+: CPT

## 2020-05-01 PROCEDURE — 82565 ASSAY OF CREATININE: CPT

## 2020-05-01 RX ADMIN — IOPAMIDOL 85 ML: 612 INJECTION, SOLUTION INTRAVENOUS at 11:57

## 2020-05-01 RX ADMIN — BARIUM SULFATE 450 ML: 21 SUSPENSION ORAL at 10:25

## 2020-05-07 ENCOUNTER — TELEMEDICINE (OUTPATIENT)
Dept: ONCOLOGY | Facility: CLINIC | Age: 55
End: 2020-05-07

## 2020-05-07 DIAGNOSIS — C16.2 MALIGNANT NEOPLASM OF BODY OF STOMACH (HCC): Primary | Chronic | ICD-10-CM

## 2020-05-07 PROCEDURE — 99213 OFFICE O/P EST LOW 20 MIN: CPT | Performed by: INTERNAL MEDICINE

## 2020-05-07 NOTE — PROGRESS NOTES
Telehealth follow-up visit:  This was an audio and video enabled telemedicine encounter.  Sophistication to allow for video communication was not adequate to allow for this so this was converted to an audio visit.  Total time of visit was 15 minutes discussing the following  You have chosen to receive care through a telephone visit. Do you consent to use a telephone visit for your medical care today? Yes  CHIEF COMPLAINT: Follow-up gastric carcinoma    Problem List:     Malignant neoplasm of body of stomach (CMS/HCC)    2/6/2018 Initial Diagnosis     Gastric cancer.  53-year-old female with a three-month history of indigestion and upper abdominal pain.  She was started on omeprazole for presumed reflux, symptoms were not relieved.  She underwent an EGD on 2/6/2018 with Dr. Matt that revealed a large, friable and ulcerated mass in the body of the stomach.  Biopsies returned moderately to poorly differentiated adenocarcinoma.  Staging studies including CT scan of the chest abdomen and pelvis were negative for distant disease.  She underwent diagnostic laparoscopic with peritoneal washings to complete staging on 3/13/2018.  Baseline CBC 3/9/2018 WBC 4820, hemoglobin 8.5, hematocrit 28.9%, platelet count 351,000, MCV 64.8, MCH 19.1, MCHC 29.4.  CEA normal at less than 0.50.  HER-2/erik testing from gastric body biopsy was negative.  CMP was normal other than for serum calcium slightly decreased at 8.6.        2/6/2018 Procedure     EGD revealed gastric ulcerated mass in body of stomach.  Pathology returned:   Final Diagnosis    1. GASTRIC BODY BIOPSY:  Invasive moderate to poorly differentiated adenocarcinoma with ulceration (see comment).   2. GASTRIC ANTRUM BIOPSY:  Minimal chronic gastritis without activity.  No intestinal metaplasia or dysplasia identified.  No Helicobacter pylori-like organisms identified on routine stains.   HER2/erik testing negative  FLUORESCENCE IN-SITU HYBRIDIZATION (FISH)  "REPORT:  Negative/Not Amplified  HER2/WILL-17 Ratio: 1.3        2/9/2018 Imaging     CT abdomen/pelvis IMPRESSION:     There is mild thickening and prominence of the mid gastric wall of the  body of the stomach, middle third.     Extragastric mass is not identified. The lesser sac and retrogastric  spaces are clear.     Visceral tumor, adenopathy, stranding or caking is not currently  identified as described. There is no retroperitoneal or retrogastric  adenopathy. The lower lung zones are clear with no evidence of reactive  pleural effusion or occult mass. There is no liver metastasis and no  other acute focal CT abnormality is identified within the abdomen or  pelvis.     Incidental note is made of a nonobstructing stone right kidney right  lower pole.      3/9/2018 Imaging     CT Chest IMPRESSION:  No acute intrathoracic abnormality. No definite evidence of  metastatic disease.      3/13/2018 Procedure     Diagnostic laproscopy with biopsy and lysis of adhesions.  Pathology returned:  Final Diagnosis   PERITONEAL NODULE, EXCISIONAL BIOPSY:  Benign fibrotic nodule. (See comment)  JFJ/klb    Electronically signed by Boaz Jerez MD on 3/15/2018 at 1624   Comment See result below    The biopsy shows a lobulated fibrotic nodule with some intermixed lymphoid cells. The nodule does not appear neoplastic. One cannot entirely exclude a \"burned out\" lymph node, or other implant. There is no evidence of carcinoma.      Abdominal wall washings benign:  Final Diagnosis    1. ABDOMINAL WASH, WALL:  Negative for malignancy.  Scant cellularity; chronic inflammatory cells and scattered benign mesothelial cells.   2. ABDOMINAL WASH, WALL:  Negative for malignancy.  Scant cellularity; chronic inflammatory cells and scattered benign mesothelial cells.   3. ABDOMINAL WASH, WALL:  Negative for malignancy.  Benign mesothelial cells and mixed inflammatory cells.             4/5/2018 - 9/27/2018 Chemotherapy     OP GASTRIC FLOT " OXALIplatin / Leucovorin / Fluorouracil/Taxotere  x8      5/9/2018 Genetic Testing     Genetic testing negative for cancer next panel      5/17/2018 Imaging     CT chest abdomen and pelvis showed Shrinkage of the gastric tumor with no evidence of metastasis      6/29/2018 Surgery     Surgery       Procedure:  Subtotal gastrectomy showing 0.7 cm 2 out of 10 node positive moderately differentiated adenocarcinoma of the stomach.  YpT2 N1 M0 stage IIa      10/29/2018 Imaging     CT chest abdomen pelvis with contrast shows no evidence of metastasis or recurrence but does have bilateral basilar pulmonary infiltrates.  Was started on Levaquin 10/19/18.      4/19/2019 Imaging     CT chest, abdomen and pelvis IMPRESSION:  1. Considerable atelectasis at the lung bases and/or scarring similar to  prior without focal consolidation or effusion.  2. Stable appearance of the abdomen and pelvis without evidence for  recurrent disease; specifically, no new soft tissue nodular enhancement  with stable appearance of the surgical margin subtotal gastrectomy and  patulous appearance of the distal esophagus similar to prior. No new  peritoneal reticulation or fluid.      7/8/2019 Procedure     EGD with Dr. Matt, impression: Esophagitis.  Subtotal gastrectomy with normal-appearing mucosa.      11/8/2019 Imaging     CT chest abdomen pelvis with contrast shows enlarging paratracheal nodes      11/25/2019 Progression     PET/CT IMPRESSION:  There is extensive hypermetabolic activity seen within the  right supraclavicular region, mediastinum and hilar regions as well as  centrally within the celiac axis. Findings all suggesting diffuse  metastatic disease in the lymph nodes within the chest and upper  Abdomen.    Patient opted out of immunotherapy or further chemotherapy wanted to go with watchful waiting      5/1/2020 Imaging     -5/1/2020 CT chest abdomen pelvis showed stable postsurgical changes from subtotal gastrectomy with no soft  tissue enhancing mass, fluid collection, or inflammation.  There are stable mildly enlarged supraclavicular lymph nodes but no progression of metastatic involvement compared to the imaging of 1/31/2020         HISTORY OF PRESENT ILLNESS:  The patient is a 55 y.o. female, here for follow up on management of follow-up gastric carcinoma.  No signs or symptoms to suggest recurrence.  Has been on watchful waiting despite PET evidence of recurrence back in the fall.  Current CTs are stable with no progressive adenopathy or metastasis progressing.  I reviewed images and reports thereof with patient by phone.      Past Medical History:   Diagnosis Date   • Abdominal pain    • Acid reflux    • Arthritis    • Gastric cancer (CMS/HCC)    • Gastric mass    • Gastric ulcer    • History of transfusion     no reaction 2018   • Wears glasses      Past Surgical History:   Procedure Laterality Date   • CERVICAL BIOPSY  W/ LOOP ELECTRODE EXCISION     • DIAGNOSTIC LAPAROSCOPY N/A 3/13/2018    Procedure: DIAGNOSTIC LAPAROSCOPY WITH BIOPSY, LYSIS OF ADHESIONS;  Surgeon: Nicole Crooks MD;  Location: [facility] OR;  Service: General   • ENDOSCOPY     • GASTRECTOMY N/A 6/29/2018    Procedure: SUBTOTAL GASTRECTOMY;  Surgeon: Nicole Crooks MD;  Location: [facility] OR;  Service: General   • STOMACH SURGERY  06/09/2018    For removal of mass   • TUBAL ABDOMINAL LIGATION     • VENOUS ACCESS DEVICE (PORT) INSERTION     • VENOUS ACCESS DEVICE (PORT) REMOVAL N/A 12/21/2018    Procedure: PORT REMOVAL;  Surgeon: Nicole Crooks MD;  Location: [facility] OR;  Service: General       No Known Allergies    Family History and Social History reviewed and changed as necessary      REVIEW OF SYSTEM:   Review of Systems   Constitutional: Negative for appetite change, chills, diaphoresis, fatigue, fever and unexpected weight change.   HENT:   Negative for mouth sores, sore throat and trouble swallowing.    Eyes: Negative for icterus.   Respiratory: Negative for cough,  hemoptysis and shortness of breath.    Cardiovascular: Negative for chest pain, leg swelling and palpitations.   Gastrointestinal: Negative for abdominal distention, abdominal pain, blood in stool, constipation, diarrhea, nausea and vomiting.   Endocrine: Negative for hot flashes.   Genitourinary: Negative for bladder incontinence, difficulty urinating, dysuria, frequency and hematuria.    Musculoskeletal: Negative for gait problem, neck pain and neck stiffness.   Skin: Negative for rash.   Neurological: Negative for dizziness, gait problem, headaches, light-headedness and numbness.   Hematological: Negative for adenopathy. Does not bruise/bleed easily.   Psychiatric/Behavioral: Negative for depression. The patient is not nervous/anxious.    All other systems reviewed and are negative.       PHYSICAL EXAM    There were no vitals filed for this visit.  There were no vitals filed for this visit.     Constitutional: Appears well-developed and well-nourished. No distress.   Telephone visit      Lab Results   Component Value Date    HGB 12.3 01/31/2020    HCT 39.9 01/31/2020    MCV 85.3 01/31/2020     01/31/2020    WBC 3.81 01/31/2020    NEUTROABS 2.13 01/31/2020    LYMPHSABS 1.19 01/31/2020    MONOSABS 0.37 01/31/2020    EOSABS 0.09 01/31/2020    BASOSABS 0.03 01/31/2020       Lab Results   Component Value Date    GLUCOSE 85 01/31/2020    BUN 8 01/31/2020    CREATININE 0.90 05/01/2020     01/31/2020    K 4.2 01/31/2020     01/31/2020    CO2 25.0 01/31/2020    CALCIUM 9.2 01/31/2020    PROTEINTOT 7.1 01/31/2020    ALBUMIN 4.30 01/31/2020    BILITOT 0.4 01/31/2020    ALKPHOS 64 01/31/2020    AST 19 01/31/2020    ALT 14 01/31/2020                   ASSESSMENT & PLAN:  1. Gastric carcinoma with terrance recurrence: I still suspect this is terrance recurrence but off of any therapy there is no significant change in the non-bulky mediastinal and supraclavicular adenopathy seen back in January and no progressive  disease on current imaging and she feels great.  Hence we will continue watchful waiting.  As we went 4 months this time with no significant change I will make a six-month follow-up at this junction.  See my nurse practitioner back then.        Kaden Oswald MD

## 2020-11-02 ENCOUNTER — LAB (OUTPATIENT)
Dept: LAB | Facility: HOSPITAL | Age: 55
End: 2020-11-02

## 2020-11-02 ENCOUNTER — HOSPITAL ENCOUNTER (OUTPATIENT)
Dept: CT IMAGING | Facility: HOSPITAL | Age: 55
Discharge: HOME | End: 2020-11-02

## 2020-11-02 DIAGNOSIS — C16.2 MALIGNANT NEOPLASM OF BODY OF STOMACH (HCC): Chronic | ICD-10-CM

## 2020-11-02 DIAGNOSIS — C16.2 MALIGNANT NEOPLASM OF BODY OF STOMACH (HCC): ICD-10-CM

## 2020-11-02 LAB
ALBUMIN SERPL-MCNC: 4.4 G/DL (ref 3.5–5.2)
ALBUMIN/GLOB SERPL: 1.6 G/DL
ALP SERPL-CCNC: 83 U/L (ref 39–117)
ALT SERPL W P-5'-P-CCNC: 13 U/L (ref 1–33)
ANION GAP SERPL CALCULATED.3IONS-SCNC: 8 MMOL/L (ref 5–15)
AST SERPL-CCNC: 22 U/L (ref 1–32)
BASOPHILS # BLD AUTO: 0.03 10*3/MM3 (ref 0–0.2)
BASOPHILS NFR BLD AUTO: 0.6 % (ref 0–1.5)
BILIRUB SERPL-MCNC: 0.3 MG/DL (ref 0–1.2)
BUN SERPL-MCNC: 11 MG/DL (ref 6–20)
BUN/CREAT SERPL: 13.8 (ref 7–25)
CALCIUM SPEC-SCNC: 9.2 MG/DL (ref 8.6–10.5)
CHLORIDE SERPL-SCNC: 105 MMOL/L (ref 98–107)
CO2 SERPL-SCNC: 28 MMOL/L (ref 22–29)
CREAT SERPL-MCNC: 0.8 MG/DL (ref 0.57–1)
DEPRECATED RDW RBC AUTO: 45.9 FL (ref 37–54)
EOSINOPHIL # BLD AUTO: 0.1 10*3/MM3 (ref 0–0.4)
EOSINOPHIL NFR BLD AUTO: 2.1 % (ref 0.3–6.2)
ERYTHROCYTE [DISTWIDTH] IN BLOOD BY AUTOMATED COUNT: 14.4 % (ref 12.3–15.4)
GFR SERPL CREATININE-BSD FRML MDRD: 90 ML/MIN/1.73
GLOBULIN UR ELPH-MCNC: 2.7 GM/DL
GLUCOSE SERPL-MCNC: 84 MG/DL (ref 65–99)
HCT VFR BLD AUTO: 41.7 % (ref 34–46.6)
HGB BLD-MCNC: 12.5 G/DL (ref 12–15.9)
IMM GRANULOCYTES # BLD AUTO: 0.02 10*3/MM3 (ref 0–0.05)
IMM GRANULOCYTES NFR BLD AUTO: 0.4 % (ref 0–0.5)
LYMPHOCYTES # BLD AUTO: 1.18 10*3/MM3 (ref 0.7–3.1)
LYMPHOCYTES NFR BLD AUTO: 25.1 % (ref 19.6–45.3)
MCH RBC QN AUTO: 26.2 PG (ref 26.6–33)
MCHC RBC AUTO-ENTMCNC: 30 G/DL (ref 31.5–35.7)
MCV RBC AUTO: 87.2 FL (ref 79–97)
MONOCYTES # BLD AUTO: 0.44 10*3/MM3 (ref 0.1–0.9)
MONOCYTES NFR BLD AUTO: 9.4 % (ref 5–12)
NEUTROPHILS NFR BLD AUTO: 2.93 10*3/MM3 (ref 1.7–7)
NEUTROPHILS NFR BLD AUTO: 62.4 % (ref 42.7–76)
NRBC BLD AUTO-RTO: 0 /100 WBC (ref 0–0.2)
PLATELET # BLD AUTO: 239 10*3/MM3 (ref 140–450)
PMV BLD AUTO: 10.9 FL (ref 6–12)
POTASSIUM SERPL-SCNC: 4 MMOL/L (ref 3.5–5.2)
PROT SERPL-MCNC: 7.1 G/DL (ref 6–8.5)
RBC # BLD AUTO: 4.78 10*6/MM3 (ref 3.77–5.28)
SODIUM SERPL-SCNC: 141 MMOL/L (ref 136–145)
WBC # BLD AUTO: 4.7 10*3/MM3 (ref 3.4–10.8)

## 2020-11-02 PROCEDURE — 85025 COMPLETE CBC W/AUTO DIFF WBC: CPT

## 2020-11-02 PROCEDURE — 71260 CT THORAX DX C+: CPT

## 2020-11-02 PROCEDURE — 25010000002 IOPAMIDOL 61 % SOLUTION: Performed by: INTERNAL MEDICINE

## 2020-11-02 PROCEDURE — 80053 COMPREHEN METABOLIC PANEL: CPT

## 2020-11-02 PROCEDURE — 74177 CT ABD & PELVIS W/CONTRAST: CPT

## 2020-11-02 PROCEDURE — 36415 COLL VENOUS BLD VENIPUNCTURE: CPT

## 2020-11-02 RX ADMIN — IOPAMIDOL 85 ML: 612 INJECTION, SOLUTION INTRAVENOUS at 11:04

## 2020-11-09 ENCOUNTER — OFFICE VISIT (OUTPATIENT)
Dept: ONCOLOGY | Facility: CLINIC | Age: 55
End: 2020-11-09

## 2020-11-09 VITALS
HEIGHT: 62 IN | HEART RATE: 73 BPM | RESPIRATION RATE: 16 BRPM | BODY MASS INDEX: 28.89 KG/M2 | SYSTOLIC BLOOD PRESSURE: 123 MMHG | TEMPERATURE: 98.7 F | WEIGHT: 157 LBS | OXYGEN SATURATION: 96 % | DIASTOLIC BLOOD PRESSURE: 82 MMHG

## 2020-11-09 DIAGNOSIS — C16.2 MALIGNANT NEOPLASM OF BODY OF STOMACH (HCC): Primary | Chronic | ICD-10-CM

## 2020-11-09 PROCEDURE — 99213 OFFICE O/P EST LOW 20 MIN: CPT | Performed by: NURSE PRACTITIONER

## 2020-11-09 NOTE — PROGRESS NOTES
CHIEF COMPLAINT: Gastric carcinoma    Problem List:  Oncology/Hematology History   Malignant neoplasm of body of stomach (CMS/HCC)   2/6/2018 Initial Diagnosis    Gastric cancer.  53-year-old female with a three-month history of indigestion and upper abdominal pain.  She was started on omeprazole for presumed reflux, symptoms were not relieved.  She underwent an EGD on 2/6/2018 with Dr. Matt that revealed a large, friable and ulcerated mass in the body of the stomach.  Biopsies returned moderately to poorly differentiated adenocarcinoma.  Staging studies including CT scan of the chest abdomen and pelvis were negative for distant disease.  She underwent diagnostic laparoscopic with peritoneal washings to complete staging on 3/13/2018.  Baseline CBC 3/9/2018 WBC 4820, hemoglobin 8.5, hematocrit 28.9%, platelet count 351,000, MCV 64.8, MCH 19.1, MCHC 29.4.  CEA normal at less than 0.50.  HER-2/erik testing from gastric body biopsy was negative.  CMP was normal other than for serum calcium slightly decreased at 8.6.       2/6/2018 Procedure    EGD revealed gastric ulcerated mass in body of stomach.  Pathology returned:   Final Diagnosis    1. GASTRIC BODY BIOPSY:  Invasive moderate to poorly differentiated adenocarcinoma with ulceration (see comment).   2. GASTRIC ANTRUM BIOPSY:  Minimal chronic gastritis without activity.  No intestinal metaplasia or dysplasia identified.  No Helicobacter pylori-like organisms identified on routine stains.   HER2/erik testing negative  FLUORESCENCE IN-SITU HYBRIDIZATION (FISH) REPORT:  Negative/Not Amplified  HER2/WILL-17 Ratio: 1.3       2/9/2018 Imaging    CT abdomen/pelvis IMPRESSION:     There is mild thickening and prominence of the mid gastric wall of the  body of the stomach, middle third.     Extragastric mass is not identified. The lesser sac and retrogastric  spaces are clear.     Visceral tumor, adenopathy, stranding or caking is not currently  identified as described.  "There is no retroperitoneal or retrogastric  adenopathy. The lower lung zones are clear with no evidence of reactive  pleural effusion or occult mass. There is no liver metastasis and no  other acute focal CT abnormality is identified within the abdomen or  pelvis.     Incidental note is made of a nonobstructing stone right kidney right  lower pole.     3/9/2018 Imaging    CT Chest IMPRESSION:  No acute intrathoracic abnormality. No definite evidence of  metastatic disease.     3/13/2018 Procedure    Diagnostic laproscopy with biopsy and lysis of adhesions.  Pathology returned:  Final Diagnosis   PERITONEAL NODULE, EXCISIONAL BIOPSY:  Benign fibrotic nodule. (See comment)  JFJ/klb    Electronically signed by Boaz Jerez MD on 3/15/2018 at 1624   Comment See result below    The biopsy shows a lobulated fibrotic nodule with some intermixed lymphoid cells. The nodule does not appear neoplastic. One cannot entirely exclude a \"burned out\" lymph node, or other implant. There is no evidence of carcinoma.      Abdominal wall washings benign:  Final Diagnosis    1. ABDOMINAL WASH, WALL:  Negative for malignancy.  Scant cellularity; chronic inflammatory cells and scattered benign mesothelial cells.   2. ABDOMINAL WASH, WALL:  Negative for malignancy.  Scant cellularity; chronic inflammatory cells and scattered benign mesothelial cells.   3. ABDOMINAL WASH, WALL:  Negative for malignancy.  Benign mesothelial cells and mixed inflammatory cells.            4/5/2018 - 9/27/2018 Chemotherapy    OP GASTRIC FLOT OXALIplatin / Leucovorin / Fluorouracil/Taxotere  x8     5/9/2018 Genetic Testing    Genetic testing negative for cancer next panel     5/17/2018 Imaging    CT chest abdomen and pelvis showed Shrinkage of the gastric tumor with no evidence of metastasis     6/29/2018 Surgery    Surgery       Procedure:  Subtotal gastrectomy showing 0.7 cm 2 out of 10 node positive moderately differentiated adenocarcinoma of the stomach.  " YpT2 N1 M0 stage IIa     10/29/2018 Imaging    CT chest abdomen pelvis with contrast shows no evidence of metastasis or recurrence but does have bilateral basilar pulmonary infiltrates.  Was started on Levaquin 10/19/18.     4/19/2019 Imaging    CT chest, abdomen and pelvis IMPRESSION:  1. Considerable atelectasis at the lung bases and/or scarring similar to  prior without focal consolidation or effusion.  2. Stable appearance of the abdomen and pelvis without evidence for  recurrent disease; specifically, no new soft tissue nodular enhancement  with stable appearance of the surgical margin subtotal gastrectomy and  patulous appearance of the distal esophagus similar to prior. No new  peritoneal reticulation or fluid.     7/8/2019 Procedure    EGD with Dr. Matt, impression: Esophagitis.  Subtotal gastrectomy with normal-appearing mucosa.     11/8/2019 Imaging    CT chest abdomen pelvis with contrast shows enlarging paratracheal nodes     11/25/2019 Progression    PET/CT IMPRESSION:  There is extensive hypermetabolic activity seen within the  right supraclavicular region, mediastinum and hilar regions as well as  centrally within the celiac axis. Findings all suggesting diffuse  metastatic disease in the lymph nodes within the chest and upper  Abdomen.    Patient opted out of immunotherapy or further chemotherapy wanted to go with watchful waiting     5/1/2020 Imaging    -5/1/2020 CT chest abdomen pelvis showed stable postsurgical changes from subtotal gastrectomy with no soft tissue enhancing mass, fluid collection, or inflammation.  There are stable mildly enlarged supraclavicular lymph nodes but no progression of metastatic involvement compared to the imaging of 1/31/2020 11/2/2020 Imaging    CT chest, abdomen and pelvis IMPRESSION:  Stable scarring at the lung bases bilaterally. Patient again  status post partial gastrectomy. No evidence of local recurrence or  metastatic disease. No progression of  disease. Findings are all stable.            HISTORY OF PRESENT ILLNESS:  The patient is a 55 y.o. female, here for follow up on management of gastric carcinoma.  Astrid has been doing well since we saw her last with no new concerns.  She has been feeling well, denies any pain.  Eating without difficulty, no dyspepsia.  Weight is stable.  No change in her bowel or bladder habits.      Past Medical History:   Diagnosis Date   • Abdominal pain    • Acid reflux    • Arthritis    • Gastric cancer (CMS/HCC)    • Gastric mass    • Gastric ulcer    • History of transfusion     no reaction 2018   • Wears glasses      Past Surgical History:   Procedure Laterality Date   • CERVICAL BIOPSY  W/ LOOP ELECTRODE EXCISION     • DIAGNOSTIC LAPAROSCOPY N/A 3/13/2018    Procedure: DIAGNOSTIC LAPAROSCOPY WITH BIOPSY, LYSIS OF ADHESIONS;  Surgeon: Nicole Crooks MD;  Location:  [facility] OR;  Service: General   • ENDOSCOPY     • GASTRECTOMY N/A 6/29/2018    Procedure: SUBTOTAL GASTRECTOMY;  Surgeon: Nicole Crooks MD;  Location:  [facility] OR;  Service: General   • STOMACH SURGERY  06/09/2018    For removal of mass   • TUBAL ABDOMINAL LIGATION     • VENOUS ACCESS DEVICE (PORT) INSERTION     • VENOUS ACCESS DEVICE (PORT) REMOVAL N/A 12/21/2018    Procedure: PORT REMOVAL;  Surgeon: Nicole Crooks MD;  Location:  [facility] OR;  Service: General       No Known Allergies    Family History and Social History reviewed and changed as necessary      REVIEW OF SYSTEM:   Review of Systems   Constitutional: Negative for appetite change, chills, diaphoresis, fatigue, fever and unexpected weight change.   HENT:   Negative for mouth sores, sore throat and trouble swallowing.    Eyes: Negative for icterus.   Respiratory: Negative for cough, hemoptysis and shortness of breath.    Cardiovascular: Negative for chest pain, leg swelling and palpitations.   Gastrointestinal: Negative for abdominal distention, abdominal pain, blood in stool, constipation, diarrhea,  "nausea and vomiting.   Endocrine: Negative for hot flashes.   Genitourinary: Negative for bladder incontinence, difficulty urinating, dysuria, frequency and hematuria.    Musculoskeletal: Negative for gait problem, neck pain and neck stiffness.   Skin: Negative for rash.   Neurological: Negative for dizziness, gait problem, headaches, light-headedness and numbness.   Hematological: Negative for adenopathy. Does not bruise/bleed easily.   Psychiatric/Behavioral: Negative for depression. The patient is not nervous/anxious.    All other systems reviewed and are negative.       PHYSICAL EXAM    Vitals:    11/09/20 1539   BP: 123/82   Pulse: 73   Resp: 16   Temp: 98.7 °F (37.1 °C)   SpO2: 96%   Weight: 71.2 kg (157 lb)   Height: 157.5 cm (62\")     Constitutional: Appears well-developed and well-nourished. No distress.   ECOG: (0) Fully Active - Able to Carry On All Pre-disease Performance Without Restriction  HENT:   Head: Normocephalic.   Mouth/Throat: Oropharynx is clear and moist.   Eyes: Conjunctivae are normal. Pupils are equal, round, and reactive to light. No scleral icterus.   Neck: Neck supple. No JVD present.   Cardiovascular: Normal rate, regular rhythm and normal heart sounds.    Pulmonary/Chest: Breath sounds normal. No respiratory distress.   Abdominal: Soft. Exhibits no distension. There is no tenderness.   Musculoskeletal:Exhibits no edema, tenderness or deformity.   Neurological: Alert and oriented to person, place, and time. Exhibits normal muscle tone.   Skin: No ecchymosis, no petechiae and no rash noted. Not diaphoretic. No cyanosis. Nails show no clubbing.   Psychiatric: Normal mood and affect.   Vitals reviewed.  Labs reviewed.      Lab Results   Component Value Date    HGB 12.5 11/02/2020    HCT 41.7 11/02/2020    MCV 87.2 11/02/2020     11/02/2020    WBC 4.70 11/02/2020    NEUTROABS 2.93 11/02/2020    LYMPHSABS 1.18 11/02/2020    MONOSABS 0.44 11/02/2020    EOSABS 0.10 11/02/2020    " BASOSABS 0.03 11/02/2020       Lab Results   Component Value Date    GLUCOSE 84 11/02/2020    BUN 11 11/02/2020    CREATININE 0.80 11/02/2020     11/02/2020    K 4.0 11/02/2020     11/02/2020    CO2 28.0 11/02/2020    CALCIUM 9.2 11/02/2020    PROTEINTOT 7.1 11/02/2020    ALBUMIN 4.40 11/02/2020    BILITOT 0.3 11/02/2020    ALKPHOS 83 11/02/2020    AST 22 11/02/2020    ALT 13 11/02/2020       Ct Chest With Contrast    Result Date: 11/2/2020  Stable scarring at the lung bases bilaterally. Patient again status post partial gastrectomy. No evidence of local recurrence or metastatic disease. No progression of disease. Findings are all stable.  D:  11/02/2020 E:  11/02/2020  This report was finalized on 11/2/2020 12:57 PM by Dr. Hayley Tan MD.      Ct Abdomen Pelvis With Contrast    Result Date: 11/2/2020  Stable scarring at the lung bases bilaterally. Patient again status post partial gastrectomy. No evidence of local recurrence or metastatic disease. No progression of disease. Findings are all stable.  D:  11/02/2020 E:  11/02/2020  This report was finalized on 11/2/2020 12:57 PM by Dr. Hayley Tan MD.          ASSESSMENT & PLAN:    History of gastric carcinoma with subsequent terrance recurrence non-biopsy proven thus far: Her scans are negative.  She is feeling well.  Labs are normal.  No evidence of disease recurrence or progression.  We will follow up with repeat CT chest, abdomen and pelvis along with CBC and CMP in 6 months.  She understands to notify us in the interim if she has any concerns.      I spent a total of 15 minutes in direct patient care, greater than 11  minutes face to face, time was spent in coordination of care, and counseling the patient regarding review of CT scans and labs, symptom assessment and discussion of plan of care going forward.  Answered any questions patient had regarding medications and plan of care.     Kathy Salazar, APRN  11/09/2020

## 2021-05-07 ENCOUNTER — LAB (OUTPATIENT)
Dept: LAB | Facility: HOSPITAL | Age: 56
End: 2021-05-07

## 2021-05-07 ENCOUNTER — HOSPITAL ENCOUNTER (OUTPATIENT)
Dept: CT IMAGING | Facility: HOSPITAL | Age: 56
Discharge: HOME | End: 2021-05-07

## 2021-05-07 DIAGNOSIS — C16.2 MALIGNANT NEOPLASM OF BODY OF STOMACH (HCC): ICD-10-CM

## 2021-05-07 DIAGNOSIS — C16.2 MALIGNANT NEOPLASM OF BODY OF STOMACH (HCC): Chronic | ICD-10-CM

## 2021-05-07 LAB
ALBUMIN SERPL-MCNC: 3.9 G/DL (ref 3.5–5.2)
ALBUMIN/GLOB SERPL: 1.4 G/DL
ALP SERPL-CCNC: 81 U/L (ref 39–117)
ALT SERPL W P-5'-P-CCNC: 13 U/L (ref 1–33)
ANION GAP SERPL CALCULATED.3IONS-SCNC: 11 MMOL/L (ref 5–15)
AST SERPL-CCNC: 21 U/L (ref 1–32)
BASOPHILS # BLD AUTO: 0.04 10*3/MM3 (ref 0–0.2)
BASOPHILS NFR BLD AUTO: 0.8 % (ref 0–1.5)
BILIRUB SERPL-MCNC: 0.2 MG/DL (ref 0–1.2)
BUN SERPL-MCNC: 17 MG/DL (ref 6–20)
BUN/CREAT SERPL: 18.9 (ref 7–25)
CALCIUM SPEC-SCNC: 8.7 MG/DL (ref 8.6–10.5)
CHLORIDE SERPL-SCNC: 104 MMOL/L (ref 98–107)
CO2 SERPL-SCNC: 26 MMOL/L (ref 22–29)
CREAT SERPL-MCNC: 0.9 MG/DL (ref 0.57–1)
DEPRECATED RDW RBC AUTO: 46.8 FL (ref 37–54)
EOSINOPHIL # BLD AUTO: 0.2 10*3/MM3 (ref 0–0.4)
EOSINOPHIL NFR BLD AUTO: 4.1 % (ref 0.3–6.2)
ERYTHROCYTE [DISTWIDTH] IN BLOOD BY AUTOMATED COUNT: 16.3 % (ref 12.3–15.4)
GFR SERPL CREATININE-BSD FRML MDRD: 78 ML/MIN/1.73
GLOBULIN UR ELPH-MCNC: 2.8 GM/DL
GLUCOSE SERPL-MCNC: 90 MG/DL (ref 65–99)
HCT VFR BLD AUTO: 34.7 % (ref 34–46.6)
HGB BLD-MCNC: 10.4 G/DL (ref 12–15.9)
IMM GRANULOCYTES # BLD AUTO: 0.01 10*3/MM3 (ref 0–0.05)
IMM GRANULOCYTES NFR BLD AUTO: 0.2 % (ref 0–0.5)
LYMPHOCYTES # BLD AUTO: 1.6 10*3/MM3 (ref 0.7–3.1)
LYMPHOCYTES NFR BLD AUTO: 32.9 % (ref 19.6–45.3)
MCH RBC QN AUTO: 23.5 PG (ref 26.6–33)
MCHC RBC AUTO-ENTMCNC: 30 G/DL (ref 31.5–35.7)
MCV RBC AUTO: 78.5 FL (ref 79–97)
MONOCYTES # BLD AUTO: 0.54 10*3/MM3 (ref 0.1–0.9)
MONOCYTES NFR BLD AUTO: 11.1 % (ref 5–12)
NEUTROPHILS NFR BLD AUTO: 2.48 10*3/MM3 (ref 1.7–7)
NEUTROPHILS NFR BLD AUTO: 50.9 % (ref 42.7–76)
NRBC BLD AUTO-RTO: 0 /100 WBC (ref 0–0.2)
PLATELET # BLD AUTO: 236 10*3/MM3 (ref 140–450)
PMV BLD AUTO: 11 FL (ref 6–12)
POTASSIUM SERPL-SCNC: 4.3 MMOL/L (ref 3.5–5.2)
PROT SERPL-MCNC: 6.7 G/DL (ref 6–8.5)
RBC # BLD AUTO: 4.42 10*6/MM3 (ref 3.77–5.28)
SODIUM SERPL-SCNC: 141 MMOL/L (ref 136–145)
WBC # BLD AUTO: 4.87 10*3/MM3 (ref 3.4–10.8)

## 2021-05-07 PROCEDURE — 74177 CT ABD & PELVIS W/CONTRAST: CPT

## 2021-05-07 PROCEDURE — 36415 COLL VENOUS BLD VENIPUNCTURE: CPT

## 2021-05-07 PROCEDURE — 80053 COMPREHEN METABOLIC PANEL: CPT

## 2021-05-07 PROCEDURE — 85025 COMPLETE CBC W/AUTO DIFF WBC: CPT

## 2021-05-07 PROCEDURE — 71260 CT THORAX DX C+: CPT

## 2021-05-07 PROCEDURE — 25010000002 IOPAMIDOL 61 % SOLUTION: Performed by: NURSE PRACTITIONER

## 2021-05-07 RX ADMIN — IOPAMIDOL 95 ML: 612 INJECTION, SOLUTION INTRAVENOUS at 13:46

## 2021-05-20 ENCOUNTER — OFFICE VISIT (OUTPATIENT)
Dept: ONCOLOGY | Facility: CLINIC | Age: 56
End: 2021-05-20

## 2021-05-20 ENCOUNTER — LAB (OUTPATIENT)
Dept: LAB | Facility: HOSPITAL | Age: 56
End: 2021-05-20

## 2021-05-20 VITALS
RESPIRATION RATE: 16 BRPM | DIASTOLIC BLOOD PRESSURE: 76 MMHG | SYSTOLIC BLOOD PRESSURE: 149 MMHG | HEART RATE: 65 BPM | BODY MASS INDEX: 31.1 KG/M2 | OXYGEN SATURATION: 98 % | TEMPERATURE: 96.9 F | HEIGHT: 62 IN | WEIGHT: 169 LBS

## 2021-05-20 DIAGNOSIS — C16.2 MALIGNANT NEOPLASM OF BODY OF STOMACH (HCC): ICD-10-CM

## 2021-05-20 DIAGNOSIS — C16.2 MALIGNANT NEOPLASM OF BODY OF STOMACH (HCC): Primary | ICD-10-CM

## 2021-05-20 LAB
FERRITIN SERPL-MCNC: 14.75 NG/ML (ref 13–150)
IRON 24H UR-MRATE: 31 MCG/DL (ref 37–145)
IRON SATN MFR SERPL: 6 % (ref 20–50)
TIBC SERPL-MCNC: 560 MCG/DL (ref 298–536)
TRANSFERRIN SERPL-MCNC: 376 MG/DL (ref 200–360)

## 2021-05-20 PROCEDURE — 83540 ASSAY OF IRON: CPT

## 2021-05-20 PROCEDURE — 84466 ASSAY OF TRANSFERRIN: CPT

## 2021-05-20 PROCEDURE — 99215 OFFICE O/P EST HI 40 MIN: CPT | Performed by: INTERNAL MEDICINE

## 2021-05-20 PROCEDURE — 36415 COLL VENOUS BLD VENIPUNCTURE: CPT

## 2021-05-20 PROCEDURE — 82728 ASSAY OF FERRITIN: CPT

## 2021-05-20 NOTE — PROGRESS NOTES
CHIEF COMPLAINT: History of gastric carcinoma    Problem List:  Oncology/Hematology History   Malignant neoplasm of body of stomach (CMS/HCC)   2/6/2018 Initial Diagnosis    Gastric cancer.  53-year-old female with a three-month history of indigestion and upper abdominal pain.  She was started on omeprazole for presumed reflux, symptoms were not relieved.  She underwent an EGD on 2/6/2018 with Dr. Matt that revealed a large, friable and ulcerated mass in the body of the stomach.  Biopsies returned moderately to poorly differentiated adenocarcinoma.  Staging studies including CT scan of the chest abdomen and pelvis were negative for distant disease.  She underwent diagnostic laparoscopic with peritoneal washings to complete staging on 3/13/2018.  Baseline CBC 3/9/2018 WBC 4820, hemoglobin 8.5, hematocrit 28.9%, platelet count 351,000, MCV 64.8, MCH 19.1, MCHC 29.4.  CEA normal at less than 0.50.  HER-2/erik testing from gastric body biopsy was negative.  CMP was normal other than for serum calcium slightly decreased at 8.6.       2/6/2018 Procedure    EGD revealed gastric ulcerated mass in body of stomach.  Pathology returned:   Final Diagnosis    1. GASTRIC BODY BIOPSY:  Invasive moderate to poorly differentiated adenocarcinoma with ulceration (see comment).   2. GASTRIC ANTRUM BIOPSY:  Minimal chronic gastritis without activity.  No intestinal metaplasia or dysplasia identified.  No Helicobacter pylori-like organisms identified on routine stains.   HER2/erik testing negative  FLUORESCENCE IN-SITU HYBRIDIZATION (FISH) REPORT:  Negative/Not Amplified  HER2/WILL-17 Ratio: 1.3       2/9/2018 Imaging    CT abdomen/pelvis IMPRESSION:     There is mild thickening and prominence of the mid gastric wall of the  body of the stomach, middle third.     Extragastric mass is not identified. The lesser sac and retrogastric  spaces are clear.     Visceral tumor, adenopathy, stranding or caking is not currently  identified as  "described. There is no retroperitoneal or retrogastric  adenopathy. The lower lung zones are clear with no evidence of reactive  pleural effusion or occult mass. There is no liver metastasis and no  other acute focal CT abnormality is identified within the abdomen or  pelvis.     Incidental note is made of a nonobstructing stone right kidney right  lower pole.     3/9/2018 Imaging    CT Chest IMPRESSION:  No acute intrathoracic abnormality. No definite evidence of  metastatic disease.     3/13/2018 Procedure    Diagnostic laproscopy with biopsy and lysis of adhesions.  Pathology returned:  Final Diagnosis   PERITONEAL NODULE, EXCISIONAL BIOPSY:  Benign fibrotic nodule. (See comment)  JFJ/klb    Electronically signed by Boaz Jerez MD on 3/15/2018 at 1624   Comment See result below    The biopsy shows a lobulated fibrotic nodule with some intermixed lymphoid cells. The nodule does not appear neoplastic. One cannot entirely exclude a \"burned out\" lymph node, or other implant. There is no evidence of carcinoma.      Abdominal wall washings benign:  Final Diagnosis    1. ABDOMINAL WASH, WALL:  Negative for malignancy.  Scant cellularity; chronic inflammatory cells and scattered benign mesothelial cells.   2. ABDOMINAL WASH, WALL:  Negative for malignancy.  Scant cellularity; chronic inflammatory cells and scattered benign mesothelial cells.   3. ABDOMINAL WASH, WALL:  Negative for malignancy.  Benign mesothelial cells and mixed inflammatory cells.            4/5/2018 - 9/27/2018 Chemotherapy    OP GASTRIC FLOT OXALIplatin / Leucovorin / Fluorouracil/Taxotere  x8     5/9/2018 Genetic Testing    Genetic testing negative for cancer next panel     5/17/2018 Imaging    CT chest abdomen and pelvis showed Shrinkage of the gastric tumor with no evidence of metastasis     6/29/2018 Surgery    Surgery       Procedure:  Subtotal gastrectomy showing 0.7 cm 2 out of 10 node positive moderately differentiated adenocarcinoma of the " stomach.  YpT2 N1 M0 stage IIa     10/29/2018 Imaging    CT chest abdomen pelvis with contrast shows no evidence of metastasis or recurrence but does have bilateral basilar pulmonary infiltrates.  Was started on Levaquin 10/19/18.     4/19/2019 Imaging    CT chest, abdomen and pelvis IMPRESSION:  1. Considerable atelectasis at the lung bases and/or scarring similar to  prior without focal consolidation or effusion.  2. Stable appearance of the abdomen and pelvis without evidence for  recurrent disease; specifically, no new soft tissue nodular enhancement  with stable appearance of the surgical margin subtotal gastrectomy and  patulous appearance of the distal esophagus similar to prior. No new  peritoneal reticulation or fluid.     7/8/2019 Procedure    EGD with Dr. Matt, impression: Esophagitis.  Subtotal gastrectomy with normal-appearing mucosa.     11/8/2019 Imaging    CT chest abdomen pelvis with contrast shows enlarging paratracheal nodes     11/25/2019 Progression    PET/CT IMPRESSION:  There is extensive hypermetabolic activity seen within the  right supraclavicular region, mediastinum and hilar regions as well as  centrally within the celiac axis. Findings all suggesting diffuse  metastatic disease in the lymph nodes within the chest and upper  Abdomen.    Patient opted out of immunotherapy or further chemotherapy wanted to go with watchful waiting     5/1/2020 Imaging    -5/1/2020 CT chest abdomen pelvis showed stable postsurgical changes from subtotal gastrectomy with no soft tissue enhancing mass, fluid collection, or inflammation.  There are stable mildly enlarged supraclavicular lymph nodes but no progression of metastatic involvement compared to the imaging of 1/31/2020 11/2/2020 Imaging    CT chest, abdomen and pelvis IMPRESSION:  Stable scarring at the lung bases bilaterally. Patient again  status post partial gastrectomy. No evidence of local recurrence or  metastatic disease. No progression  of disease. Findings are all stable.        5/7/2021 Imaging    CT chest, abdomen and pelvis IMPRESSION:  Stable appearance of the chest, abdomen and pelvis without  evidence for recurrence or active malignancy status post partial  gastrectomy. No acute pathology.     5/7/2021 Imaging    -5/7/2021 CT chest abdomen pelvis with contrast shows no evidence of recurrence and hemoglobin 10.4 with MCV 78.5 down from 12.5 in November with MCV 87 at that junction.  CMP unremarkable.         HISTORY OF PRESENT ILLNESS:  The patient is a 56 y.o. female, here for follow up on management of history of gastric carcinoma with unremarkable scans currently but with worsening anemia and microcytosis without change in color or caliber or consistency of stools    Past Medical History:   Diagnosis Date   • Abdominal pain    • Acid reflux    • Arthritis    • Gastric cancer (CMS/HCC)    • Gastric mass    • Gastric ulcer    • History of transfusion     no reaction 2018   • Wears glasses      Past Surgical History:   Procedure Laterality Date   • CERVICAL BIOPSY  W/ LOOP ELECTRODE EXCISION     • DIAGNOSTIC LAPAROSCOPY N/A 3/13/2018    Procedure: DIAGNOSTIC LAPAROSCOPY WITH BIOPSY, LYSIS OF ADHESIONS;  Surgeon: Nicole Crooks MD;  Location:  [facility];  Service: General   • ENDOSCOPY     • GASTRECTOMY N/A 6/29/2018    Procedure: SUBTOTAL GASTRECTOMY;  Surgeon: Nicole Crooks MD;  Location:  [facility];  Service: General   • STOMACH SURGERY  06/09/2018    For removal of mass   • TUBAL ABDOMINAL LIGATION     • VENOUS ACCESS DEVICE (PORT) INSERTION     • VENOUS ACCESS DEVICE (PORT) REMOVAL N/A 12/21/2018    Procedure: PORT REMOVAL;  Surgeon: Nicole Crooks MD;  Location:  [facility];  Service: General       No Known Allergies    Family History and Social History reviewed and changed as necessary    REVIEW OF SYSTEM:   Feeling great    PHYSICAL EXAM:  No jaundice or icterus.  Abdomen benign    Vitals:    05/20/21 1502   BP: 149/76   Pulse: 65  "  Resp: 16   Temp: 96.9 °F (36.1 °C)   SpO2: 98%   Weight: 76.7 kg (169 lb)   Height: 157.5 cm (62\")     Vitals:    05/20/21 1502   PainSc: 0-No pain          ECOG score: 0           Vitals reviewed.      Lab Results   Component Value Date    HGB 10.4 (L) 05/07/2021    HCT 34.7 05/07/2021    MCV 78.5 (L) 05/07/2021     05/07/2021    WBC 4.87 05/07/2021    NEUTROABS 2.48 05/07/2021    LYMPHSABS 1.60 05/07/2021    MONOSABS 0.54 05/07/2021    EOSABS 0.20 05/07/2021    BASOSABS 0.04 05/07/2021       Lab Results   Component Value Date    GLUCOSE 90 05/07/2021    BUN 17 05/07/2021    CREATININE 0.90 05/07/2021     05/07/2021    K 4.3 05/07/2021     05/07/2021    CO2 26.0 05/07/2021    CALCIUM 8.7 05/07/2021    PROTEINTOT 6.7 05/07/2021    ALBUMIN 3.90 05/07/2021    BILITOT 0.2 05/07/2021    ALKPHOS 81 05/07/2021    AST 21 05/07/2021    ALT 13 05/07/2021             ASSESSMENT & PLAN:  1.  Gastric carcinoma history  2.  Microcytic anemia    Discussion: I have communicated with Dr. Rubio get EGD and we will get her iron indices and do a telehealth visit in a few weeks to go over all this.  Assuming she has no malignant process causing the anemia then we will continue serial imaging but presently scans do not suggest recurrence.  Total time of care today inclusive of time spent prior to visit reviewing interim records and viewing images and reports of recent scans prior to her arrival and during her visit translating this information to her, and after the visit arranging for follow-up studies as outlined above including communications with Dr. Matt took 40 minutes of total patient care time throughout the day today  Kaden Oswald MD    05/20/2021      "

## 2021-06-04 ENCOUNTER — APPOINTMENT (OUTPATIENT)
Dept: PREADMISSION TESTING | Facility: HOSPITAL | Age: 56
End: 2021-06-04

## 2021-06-04 PROCEDURE — C9803 HOPD COVID-19 SPEC COLLECT: HCPCS

## 2021-06-04 PROCEDURE — U0004 COV-19 TEST NON-CDC HGH THRU: HCPCS

## 2021-06-05 LAB — SARS-COV-2 RNA NOSE QL NAA+PROBE: NOT DETECTED

## 2021-06-07 ENCOUNTER — OUTSIDE FACILITY SERVICE (OUTPATIENT)
Dept: GASTROENTEROLOGY | Facility: CLINIC | Age: 56
End: 2021-06-07

## 2021-06-07 PROCEDURE — 43239 EGD BIOPSY SINGLE/MULTIPLE: CPT | Performed by: INTERNAL MEDICINE

## 2021-06-07 PROCEDURE — 88305 TISSUE EXAM BY PATHOLOGIST: CPT | Performed by: INTERNAL MEDICINE

## 2021-06-08 ENCOUNTER — LAB REQUISITION (OUTPATIENT)
Dept: LAB | Facility: HOSPITAL | Age: 56
End: 2021-06-08

## 2021-06-08 DIAGNOSIS — D64.9 ANEMIA, UNSPECIFIED: ICD-10-CM

## 2021-06-09 ENCOUNTER — TELEPHONE (OUTPATIENT)
Dept: GASTROENTEROLOGY | Facility: CLINIC | Age: 56
End: 2021-06-09

## 2021-06-09 NOTE — TELEPHONE ENCOUNTER
I called and spoke with Ms. Nobles regarding the biopsies.  There was no evidence for H. pylori, intestinal metaplasia or dysplasia.  The endoscopic appearance of the anastomosis was normal.  There was no gross evidence of recurrent gastric cancer.  I stated that the next step should be colonoscopy.

## 2021-06-11 ENCOUNTER — TELEMEDICINE (OUTPATIENT)
Dept: ONCOLOGY | Facility: CLINIC | Age: 56
End: 2021-06-11

## 2021-06-11 DIAGNOSIS — C16.2 MALIGNANT NEOPLASM OF BODY OF STOMACH (HCC): Primary | Chronic | ICD-10-CM

## 2021-06-11 PROCEDURE — 99214 OFFICE O/P EST MOD 30 MIN: CPT | Performed by: INTERNAL MEDICINE

## 2021-06-11 RX ORDER — FERROUS SULFATE 325(65) MG
TABLET ORAL
Qty: 30 TABLET | Refills: 11 | Status: SHIPPED | OUTPATIENT
Start: 2021-06-11

## 2021-06-11 NOTE — PROGRESS NOTES
Telehealth follow-up visit  This was an audio and video enabled telemedicine encounter.  Done for COVID-19 risk reduction.  Verbal consent given.  Video failed.  Audio converted.    CHIEF COMPLAINT: No new complaints    Problem List:  Oncology/Hematology History   Malignant neoplasm of body of stomach (CMS/HCC)   2/6/2018 Initial Diagnosis    Gastric cancer.  53-year-old female with a three-month history of indigestion and upper abdominal pain.  She was started on omeprazole for presumed reflux, symptoms were not relieved.  She underwent an EGD on 2/6/2018 with Dr. Matt that revealed a large, friable and ulcerated mass in the body of the stomach.  Biopsies returned moderately to poorly differentiated adenocarcinoma.  Staging studies including CT scan of the chest abdomen and pelvis were negative for distant disease.  She underwent diagnostic laparoscopic with peritoneal washings to complete staging on 3/13/2018.  Baseline CBC 3/9/2018 WBC 4820, hemoglobin 8.5, hematocrit 28.9%, platelet count 351,000, MCV 64.8, MCH 19.1, MCHC 29.4.  CEA normal at less than 0.50.  HER-2/erik testing from gastric body biopsy was negative.  CMP was normal other than for serum calcium slightly decreased at 8.6.       2/6/2018 Procedure    EGD revealed gastric ulcerated mass in body of stomach.  Pathology returned:   Final Diagnosis    1. GASTRIC BODY BIOPSY:  Invasive moderate to poorly differentiated adenocarcinoma with ulceration (see comment).   2. GASTRIC ANTRUM BIOPSY:  Minimal chronic gastritis without activity.  No intestinal metaplasia or dysplasia identified.  No Helicobacter pylori-like organisms identified on routine stains.   HER2/erik testing negative  FLUORESCENCE IN-SITU HYBRIDIZATION (FISH) REPORT:  Negative/Not Amplified  HER2/WILL-17 Ratio: 1.3       2/9/2018 Imaging    CT abdomen/pelvis IMPRESSION:     There is mild thickening and prominence of the mid gastric wall of the  body of the stomach, middle third.    "  Extragastric mass is not identified. The lesser sac and retrogastric  spaces are clear.     Visceral tumor, adenopathy, stranding or caking is not currently  identified as described. There is no retroperitoneal or retrogastric  adenopathy. The lower lung zones are clear with no evidence of reactive  pleural effusion or occult mass. There is no liver metastasis and no  other acute focal CT abnormality is identified within the abdomen or  pelvis.     Incidental note is made of a nonobstructing stone right kidney right  lower pole.     3/9/2018 Imaging    CT Chest IMPRESSION:  No acute intrathoracic abnormality. No definite evidence of  metastatic disease.     3/13/2018 Procedure    Diagnostic laproscopy with biopsy and lysis of adhesions.  Pathology returned:  Final Diagnosis   PERITONEAL NODULE, EXCISIONAL BIOPSY:  Benign fibrotic nodule. (See comment)  JFJ/klb    Electronically signed by Boaz Jerez MD on 3/15/2018 at 1624   Comment See result below    The biopsy shows a lobulated fibrotic nodule with some intermixed lymphoid cells. The nodule does not appear neoplastic. One cannot entirely exclude a \"burned out\" lymph node, or other implant. There is no evidence of carcinoma.      Abdominal wall washings benign:  Final Diagnosis    1. ABDOMINAL WASH, WALL:  Negative for malignancy.  Scant cellularity; chronic inflammatory cells and scattered benign mesothelial cells.   2. ABDOMINAL WASH, WALL:  Negative for malignancy.  Scant cellularity; chronic inflammatory cells and scattered benign mesothelial cells.   3. ABDOMINAL WASH, WALL:  Negative for malignancy.  Benign mesothelial cells and mixed inflammatory cells.            4/5/2018 - 9/27/2018 Chemotherapy    OP GASTRIC FLOT OXALIplatin / Leucovorin / Fluorouracil/Taxotere  x8     5/9/2018 Genetic Testing    Genetic testing negative for cancer next panel     5/17/2018 Imaging    CT chest abdomen and pelvis showed Shrinkage of the gastric tumor with no evidence " of metastasis     6/29/2018 Surgery    Surgery       Procedure:  Subtotal gastrectomy showing 0.7 cm 2 out of 10 node positive moderately differentiated adenocarcinoma of the stomach.  YpT2 N1 M0 stage IIa     10/29/2018 Imaging    CT chest abdomen pelvis with contrast shows no evidence of metastasis or recurrence but does have bilateral basilar pulmonary infiltrates.  Was started on Levaquin 10/19/18.     4/19/2019 Imaging    CT chest, abdomen and pelvis IMPRESSION:  1. Considerable atelectasis at the lung bases and/or scarring similar to  prior without focal consolidation or effusion.  2. Stable appearance of the abdomen and pelvis without evidence for  recurrent disease; specifically, no new soft tissue nodular enhancement  with stable appearance of the surgical margin subtotal gastrectomy and  patulous appearance of the distal esophagus similar to prior. No new  peritoneal reticulation or fluid.     7/8/2019 Procedure    EGD with Dr. Matt, impression: Esophagitis.  Subtotal gastrectomy with normal-appearing mucosa.     11/8/2019 Imaging    CT chest abdomen pelvis with contrast shows enlarging paratracheal nodes     11/25/2019 Progression    PET/CT IMPRESSION:  There is extensive hypermetabolic activity seen within the  right supraclavicular region, mediastinum and hilar regions as well as  centrally within the celiac axis. Findings all suggesting diffuse  metastatic disease in the lymph nodes within the chest and upper  Abdomen.    Patient opted out of immunotherapy or further chemotherapy wanted to go with watchful waiting     5/1/2020 Imaging    -5/1/2020 CT chest abdomen pelvis showed stable postsurgical changes from subtotal gastrectomy with no soft tissue enhancing mass, fluid collection, or inflammation.  There are stable mildly enlarged supraclavicular lymph nodes but no progression of metastatic involvement compared to the imaging of 1/31/2020 11/2/2020 Imaging    CT chest, abdomen and pelvis  IMPRESSION:  Stable scarring at the lung bases bilaterally. Patient again  status post partial gastrectomy. No evidence of local recurrence or  metastatic disease. No progression of disease. Findings are all stable.        5/7/2021 Imaging    CT chest, abdomen and pelvis IMPRESSION:  Stable appearance of the chest, abdomen and pelvis without  evidence for recurrence or active malignancy status post partial  gastrectomy. No acute pathology.     5/7/2021 Imaging    -5/7/2021 CT chest abdomen pelvis with contrast shows no evidence of recurrence and hemoglobin 10.4 with MCV 78.5 down from 12.5 in November with MCV 87 at that junction.  CMP unremarkable.     5/20/2021 -  Other Event    -5/20/2021 ferritin low end of normal 14.75 with iron low at 31 and saturation low 6% with elevated total iron binding capacity 560 and elevated transferrin 376 commensurate with iron deficiency anemia.       6/9/2021 -  Other Event      -6/9/2021 reports the patient from Dr. Matt showed no Helicobacter pylori, intestinal metaplasia, or dysplasia.  Anastomosis normal.  No gross evidence of recurrent gastric cancer.  Recommends next step of colonoscopy.         HISTORY OF PRESENT ILLNESS:  The patient is a 56 y.o. female, here for follow up on management of iron deficiency in the face of history of gastric cancer but no recurrence on EGD.  Due for colonoscopy.    Past Medical History:   Diagnosis Date   • Abdominal pain    • Acid reflux    • Arthritis    • Gastric cancer (CMS/HCC)    • Gastric mass    • Gastric ulcer    • History of transfusion     no reaction 2018   • Wears glasses      Past Surgical History:   Procedure Laterality Date   • CERVICAL BIOPSY  W/ LOOP ELECTRODE EXCISION     • DIAGNOSTIC LAPAROSCOPY N/A 3/13/2018    Procedure: DIAGNOSTIC LAPAROSCOPY WITH BIOPSY, LYSIS OF ADHESIONS;  Surgeon: Nicole Crooks MD;  Location: [facility];  Service: General   • ENDOSCOPY     • GASTRECTOMY N/A 6/29/2018    Procedure: SUBTOTAL  GASTRECTOMY;  Surgeon: Nicole Crooks MD;  Location: [facility];  Service: General   • STOMACH SURGERY  06/09/2018    For removal of mass   • TUBAL ABDOMINAL LIGATION     • VENOUS ACCESS DEVICE (PORT) INSERTION     • VENOUS ACCESS DEVICE (PORT) REMOVAL N/A 12/21/2018    Procedure: PORT REMOVAL;  Surgeon: Nicole Crooks MD;  Location:  [facility];  Service: General       No Known Allergies    Family History and Social History reviewed and changed as necessary    REVIEW OF SYSTEM:   No new complaints    PHYSICAL EXAM:  Phone visit    There were no vitals filed for this visit.  There were no vitals filed for this visit.           Lab Results   Component Value Date    HGB 10.4 (L) 05/07/2021    HCT 34.7 05/07/2021    MCV 78.5 (L) 05/07/2021     05/07/2021    WBC 4.87 05/07/2021    NEUTROABS 2.48 05/07/2021    LYMPHSABS 1.60 05/07/2021    MONOSABS 0.54 05/07/2021    EOSABS 0.20 05/07/2021    BASOSABS 0.04 05/07/2021       Lab Results   Component Value Date    GLUCOSE 90 05/07/2021    BUN 17 05/07/2021    CREATININE 0.90 05/07/2021     05/07/2021    K 4.3 05/07/2021     05/07/2021    CO2 26.0 05/07/2021    CALCIUM 8.7 05/07/2021    PROTEINTOT 6.7 05/07/2021    ALBUMIN 3.90 05/07/2021    BILITOT 0.2 05/07/2021    ALKPHOS 81 05/07/2021    AST 21 05/07/2021    ALT 13 05/07/2021             ASSESSMENT & PLAN:  1.  History of gastric carcinoma  2.  Iron deficiency anemia    Discussion: I reviewed her EGD report and blood results and went over that with her.  She is due for colonoscopy with Dr. Matt.  We will start her on ferrous sulfate 325 mg twice a day every other day with vitamin C 500 mg to improve absorption.  Will repeat CBC, CMP, and CTs prior to return mid August… Sooner if Dr. Matt finds anything sinister.    Total time of care today inclusive of time spent today prior to her visit reviewing records from Dr. Matt and labs and scans and during visit translating all this information to her  and after visit arranging for plan as outlined above took 30 minutes of total patient care time through the day today.  Kaden Oswald MD    06/11/2021

## 2021-06-14 DIAGNOSIS — Z12.11 SCREENING FOR COLON CANCER: Primary | ICD-10-CM

## 2021-06-14 RX ORDER — SODIUM, POTASSIUM,MAG SULFATES 17.5-3.13G
SOLUTION, RECONSTITUTED, ORAL ORAL
Qty: 354 ML | Refills: 0 | Status: SHIPPED | OUTPATIENT
Start: 2021-06-14 | End: 2022-07-06

## 2021-06-14 NOTE — PROGRESS NOTES
Dr. Crooks asked that I see patient today in her office. I saw patient after Dr. Crooks. Patient is very quiet and is overwhelmed with her diagnosis of the gastric cancer. Patient did listen to me and was able to repeat back to me what I was educating her on. I gave patient a packet with resource/educational information along with my contact card. I described my role as a navigator to patient.  I went over fatigue, exercise, nutrition of protein/calories, mouth sores, nausea and diarrhea. I also went over how to put Emla cream over port 30 minutes before she gets to the [facility] on her days of chemotherapy.  I encouraged patient to please communicate with her Oncologist about what side effects that she might have after her chemotherapy treatments and pt verbalized understanding. Patient says that she will call if she has any questions. I feel that our conversation went well at this time. I will be available to navigate patient through the [employer]. AG   Rooming documentation of social history includes tobacco screening only.    The vital signs were obtained by ZAIN CONDE

## 2021-07-02 ENCOUNTER — OUTSIDE FACILITY SERVICE (OUTPATIENT)
Dept: GASTROENTEROLOGY | Facility: CLINIC | Age: 56
End: 2021-07-02

## 2021-07-02 PROCEDURE — 45378 DIAGNOSTIC COLONOSCOPY: CPT | Performed by: INTERNAL MEDICINE

## 2021-08-09 ENCOUNTER — LAB (OUTPATIENT)
Dept: LAB | Facility: HOSPITAL | Age: 56
End: 2021-08-09

## 2021-08-09 ENCOUNTER — HOSPITAL ENCOUNTER (OUTPATIENT)
Dept: CT IMAGING | Facility: HOSPITAL | Age: 56
Discharge: HOME | End: 2021-08-09

## 2021-08-09 DIAGNOSIS — C16.2 MALIGNANT NEOPLASM OF BODY OF STOMACH (HCC): Chronic | ICD-10-CM

## 2021-08-09 DIAGNOSIS — C16.2 MALIGNANT NEOPLASM OF BODY OF STOMACH (HCC): ICD-10-CM

## 2021-08-09 LAB
ALBUMIN SERPL-MCNC: 4.5 G/DL (ref 3.5–5.2)
ALBUMIN/GLOB SERPL: 1.5 G/DL
ALP SERPL-CCNC: 81 U/L (ref 39–117)
ALT SERPL W P-5'-P-CCNC: 13 U/L (ref 1–33)
ANION GAP SERPL CALCULATED.3IONS-SCNC: 10 MMOL/L (ref 5–15)
AST SERPL-CCNC: 22 U/L (ref 1–32)
BASOPHILS # BLD AUTO: 0.04 10*3/MM3 (ref 0–0.2)
BASOPHILS NFR BLD AUTO: 0.9 % (ref 0–1.5)
BILIRUB SERPL-MCNC: 0.2 MG/DL (ref 0–1.2)
BUN SERPL-MCNC: 18 MG/DL (ref 6–20)
BUN/CREAT SERPL: 18.6 (ref 7–25)
BURR CELLS BLD QL SMEAR: NORMAL
CALCIUM SPEC-SCNC: 9.6 MG/DL (ref 8.6–10.5)
CHLORIDE SERPL-SCNC: 103 MMOL/L (ref 98–107)
CO2 SERPL-SCNC: 25 MMOL/L (ref 22–29)
CREAT SERPL-MCNC: 0.97 MG/DL (ref 0.57–1)
DEPRECATED RDW RBC AUTO: 60.6 FL (ref 37–54)
EOSINOPHIL # BLD AUTO: 0.12 10*3/MM3 (ref 0–0.4)
EOSINOPHIL NFR BLD AUTO: 2.6 % (ref 0.3–6.2)
ERYTHROCYTE [DISTWIDTH] IN BLOOD BY AUTOMATED COUNT: 20.6 % (ref 12.3–15.4)
FERRITIN SERPL-MCNC: 25.31 NG/ML (ref 13–150)
GFR SERPL CREATININE-BSD FRML MDRD: 72 ML/MIN/1.73
GLOBULIN UR ELPH-MCNC: 3.1 GM/DL
GLUCOSE SERPL-MCNC: 92 MG/DL (ref 65–99)
HCT VFR BLD AUTO: 42.2 % (ref 34–46.6)
HGB BLD-MCNC: 12.8 G/DL (ref 12–15.9)
IMM GRANULOCYTES # BLD AUTO: 0.01 10*3/MM3 (ref 0–0.05)
IMM GRANULOCYTES NFR BLD AUTO: 0.2 % (ref 0–0.5)
IRON 24H UR-MRATE: 195 MCG/DL (ref 37–145)
IRON SATN MFR SERPL: 39 % (ref 20–50)
LYMPHOCYTES # BLD AUTO: 1.64 10*3/MM3 (ref 0.7–3.1)
LYMPHOCYTES NFR BLD AUTO: 35.8 % (ref 19.6–45.3)
MCH RBC QN AUTO: 25.1 PG (ref 26.6–33)
MCHC RBC AUTO-ENTMCNC: 30.3 G/DL (ref 31.5–35.7)
MCV RBC AUTO: 82.9 FL (ref 79–97)
MONOCYTES # BLD AUTO: 0.51 10*3/MM3 (ref 0.1–0.9)
MONOCYTES NFR BLD AUTO: 11.1 % (ref 5–12)
NEUTROPHILS NFR BLD AUTO: 2.26 10*3/MM3 (ref 1.7–7)
NEUTROPHILS NFR BLD AUTO: 49.4 % (ref 42.7–76)
NRBC BLD AUTO-RTO: 0 /100 WBC (ref 0–0.2)
OVALOCYTES BLD QL SMEAR: NORMAL
PLAT MORPH BLD: NORMAL
PLATELET # BLD AUTO: 238 10*3/MM3 (ref 140–450)
PMV BLD AUTO: 10.9 FL (ref 6–12)
POTASSIUM SERPL-SCNC: 4.6 MMOL/L (ref 3.5–5.2)
PROT SERPL-MCNC: 7.6 G/DL (ref 6–8.5)
RBC # BLD AUTO: 5.09 10*6/MM3 (ref 3.77–5.28)
SCHISTOCYTES BLD QL SMEAR: NORMAL
SODIUM SERPL-SCNC: 138 MMOL/L (ref 136–145)
TIBC SERPL-MCNC: 505 MCG/DL (ref 298–536)
TRANSFERRIN SERPL-MCNC: 339 MG/DL (ref 200–360)
WBC # BLD AUTO: 4.58 10*3/MM3 (ref 3.4–10.8)
WBC MORPH BLD: NORMAL

## 2021-08-09 PROCEDURE — 74177 CT ABD & PELVIS W/CONTRAST: CPT

## 2021-08-09 PROCEDURE — 84466 ASSAY OF TRANSFERRIN: CPT

## 2021-08-09 PROCEDURE — 25010000002 IOPAMIDOL 61 % SOLUTION: Performed by: INTERNAL MEDICINE

## 2021-08-09 PROCEDURE — 83540 ASSAY OF IRON: CPT

## 2021-08-09 PROCEDURE — 80053 COMPREHEN METABOLIC PANEL: CPT

## 2021-08-09 PROCEDURE — 36415 COLL VENOUS BLD VENIPUNCTURE: CPT

## 2021-08-09 PROCEDURE — 71260 CT THORAX DX C+: CPT

## 2021-08-09 PROCEDURE — 85025 COMPLETE CBC W/AUTO DIFF WBC: CPT

## 2021-08-09 PROCEDURE — 82728 ASSAY OF FERRITIN: CPT

## 2021-08-09 PROCEDURE — 85007 BL SMEAR W/DIFF WBC COUNT: CPT

## 2021-08-09 RX ADMIN — IOPAMIDOL 95 ML: 612 INJECTION, SOLUTION INTRAVENOUS at 15:25

## 2021-08-20 ENCOUNTER — OFFICE VISIT (OUTPATIENT)
Dept: ONCOLOGY | Facility: CLINIC | Age: 56
End: 2021-08-20

## 2021-08-20 VITALS
OXYGEN SATURATION: 96 % | TEMPERATURE: 97.8 F | HEART RATE: 85 BPM | BODY MASS INDEX: 32.31 KG/M2 | SYSTOLIC BLOOD PRESSURE: 137 MMHG | RESPIRATION RATE: 16 BRPM | HEIGHT: 62 IN | DIASTOLIC BLOOD PRESSURE: 80 MMHG | WEIGHT: 175.6 LBS

## 2021-08-20 DIAGNOSIS — E61.1 IRON DEFICIENCY: ICD-10-CM

## 2021-08-20 DIAGNOSIS — Z85.028 HISTORY OF GASTRIC CANCER: Primary | ICD-10-CM

## 2021-08-20 PROCEDURE — 99214 OFFICE O/P EST MOD 30 MIN: CPT | Performed by: NURSE PRACTITIONER

## 2021-08-20 NOTE — PROGRESS NOTES
CHIEF COMPLAINT:  1.  History of gastric carcinoma   2.  Iron deficiency anemia    Problem List:  Oncology/Hematology History Overview Note   1.  Gastric cancer:  53-year-old female with a three-month history of indigestion and upper abdominal pain.  She was started on omeprazole for presumed reflux, symptoms were not relieved.  She underwent an EGD on 2/6/2018 with Dr. Matt that revealed a large, friable and ulcerated mass in the body of the stomach.  Biopsies returned moderately to poorly differentiated adenocarcinoma.  Staging studies including CT scan of the chest abdomen and pelvis were negative for distant disease.  She underwent diagnostic laparoscopic with peritoneal washings to complete staging on 3/13/2018.  Baseline CBC 3/9/2018 WBC 4820, hemoglobin 8.5, hematocrit 28.9%, platelet count 351,000, MCV 64.8, MCH 19.1, MCHC 29.4.  CEA normal at less than 0.50.  HER-2/erik testing from gastric body biopsy was negative.  CMP was normal other than for serum calcium slightly decreased at 8.6.    2.  Iron deficiency anemia: Patient with microcytic anemia noted at visit on 5/20/2021, referred to Dr. Matt for endoscopic evaluation.  She underwent EGD on 6/9/2021 that was negative and also colonoscopy on 7/2/2021 that was negative other than for internal hemorrhoids and diverticulosis without bleeding.  She was started on oral iron.     Malignant neoplasm of body of stomach (CMS/HCC)   2/6/2018 Initial Diagnosis    Gastric cancer     2/6/2018 Procedure    EGD revealed gastric ulcerated mass in body of stomach.  Pathology returned:   Final Diagnosis    1. GASTRIC BODY BIOPSY:  Invasive moderate to poorly differentiated adenocarcinoma with ulceration (see comment).   2. GASTRIC ANTRUM BIOPSY:  Minimal chronic gastritis without activity.  No intestinal metaplasia or dysplasia identified.  No Helicobacter pylori-like organisms identified on routine stains.   HER2/erik testing negative  FLUORESCENCE IN-SITU  "HYBRIDIZATION (FISH) REPORT:  Negative/Not Amplified  HER2/WILL-17 Ratio: 1.3       2/9/2018 Imaging    CT abdomen/pelvis IMPRESSION:     There is mild thickening and prominence of the mid gastric wall of the  body of the stomach, middle third.     Extragastric mass is not identified. The lesser sac and retrogastric  spaces are clear.     Visceral tumor, adenopathy, stranding or caking is not currently  identified as described. There is no retroperitoneal or retrogastric  adenopathy. The lower lung zones are clear with no evidence of reactive  pleural effusion or occult mass. There is no liver metastasis and no  other acute focal CT abnormality is identified within the abdomen or  pelvis.     Incidental note is made of a nonobstructing stone right kidney right  lower pole.     3/9/2018 Imaging    CT Chest IMPRESSION:  No acute intrathoracic abnormality. No definite evidence of  metastatic disease.     3/13/2018 Procedure    Diagnostic laproscopy with biopsy and lysis of adhesions.  Pathology returned:  Final Diagnosis   PERITONEAL NODULE, EXCISIONAL BIOPSY:  Benign fibrotic nodule. (See comment)  JFJ/klb    Electronically signed by Boaz Jerez MD on 3/15/2018 at 1624   Comment See result below    The biopsy shows a lobulated fibrotic nodule with some intermixed lymphoid cells. The nodule does not appear neoplastic. One cannot entirely exclude a \"burned out\" lymph node, or other implant. There is no evidence of carcinoma.      Abdominal wall washings benign:  Final Diagnosis    1. ABDOMINAL WASH, WALL:  Negative for malignancy.  Scant cellularity; chronic inflammatory cells and scattered benign mesothelial cells.   2. ABDOMINAL WASH, WALL:  Negative for malignancy.  Scant cellularity; chronic inflammatory cells and scattered benign mesothelial cells.   3. ABDOMINAL WASH, WALL:  Negative for malignancy.  Benign mesothelial cells and mixed inflammatory cells.            4/5/2018 - 9/27/2018 Chemotherapy    OP GASTRIC " FLOT OXALIplatin / Leucovorin / Fluorouracil/Taxotere  x8     5/9/2018 Genetic Testing    Genetic testing negative for cancer next panel     5/17/2018 Imaging    CT chest abdomen and pelvis showed Shrinkage of the gastric tumor with no evidence of metastasis     6/29/2018 Surgery    Surgery       Procedure:  Subtotal gastrectomy showing 0.7 cm 2 out of 10 node positive moderately differentiated adenocarcinoma of the stomach.  YpT2 N1 M0 stage IIa     10/29/2018 Imaging    CT chest abdomen pelvis with contrast shows no evidence of metastasis or recurrence but does have bilateral basilar pulmonary infiltrates.  Was started on Levaquin 10/19/18.     4/19/2019 Imaging    CT chest, abdomen and pelvis IMPRESSION:  1. Considerable atelectasis at the lung bases and/or scarring similar to  prior without focal consolidation or effusion.  2. Stable appearance of the abdomen and pelvis without evidence for  recurrent disease; specifically, no new soft tissue nodular enhancement  with stable appearance of the surgical margin subtotal gastrectomy and  patulous appearance of the distal esophagus similar to prior. No new  peritoneal reticulation or fluid.     7/8/2019 Procedure    EGD with Dr. Matt, impression: Esophagitis.  Subtotal gastrectomy with normal-appearing mucosa.     11/8/2019 Imaging    CT chest abdomen pelvis with contrast shows enlarging paratracheal nodes     11/25/2019 Progression    PET/CT IMPRESSION:  There is extensive hypermetabolic activity seen within the  right supraclavicular region, mediastinum and hilar regions as well as  centrally within the celiac axis. Findings all suggesting diffuse  metastatic disease in the lymph nodes within the chest and upper  Abdomen.    Patient opted out of immunotherapy or further chemotherapy wanted to go with watchful waiting     5/1/2020 Imaging    -5/1/2020 CT chest abdomen pelvis showed stable postsurgical changes from subtotal gastrectomy with no soft tissue  enhancing mass, fluid collection, or inflammation.  There are stable mildly enlarged supraclavicular lymph nodes but no progression of metastatic involvement compared to the imaging of 1/31/2020 11/2/2020 Imaging    CT chest, abdomen and pelvis IMPRESSION:  Stable scarring at the lung bases bilaterally. Patient again  status post partial gastrectomy. No evidence of local recurrence or  metastatic disease. No progression of disease. Findings are all stable.        5/7/2021 Imaging    -5/7/2021 CT chest abdomen pelvis with contrast shows no evidence of recurrence and hemoglobin 10.4 with MCV 78.5 down from 12.5 in November with MCV 87 at that junction.  CMP unremarkable.     5/20/2021 -  Other Event    -5/20/2021 ferritin low end of normal 14.75 with iron low at 31 and saturation low 6% with elevated total iron binding capacity 560 and elevated transferrin 376 commensurate with iron deficiency anemia.       6/9/2021 -  Other Event      -6/9/2021 reports the patient from Dr. Matt showed no Helicobacter pylori, intestinal metaplasia, or dysplasia.  Anastomosis normal.  No gross evidence of recurrent gastric cancer.  Recommends next step of colonoscopy.     7/2/2021 Procedure    Colonoscopy: Internal hemorrhoids and diverticulosis without bleeding, otherwise normal.     8/9/2021 Imaging    CT chest, abdomen and pelvis IMPRESSION:  Stable appearance of the chest, abdomen and pelvis. Some  chronic change is stable within the lung bases bilaterally. There is no  evidence of local recurrence or metastatic disease.         HISTORY OF PRESENT ILLNESS:  The patient is a 56 y.o. female, here for follow up on management of history of gastric carcinoma and iron deficiency anemia     Past Medical History:   Diagnosis Date   • Abdominal pain    • Acid reflux    • Arthritis    • Gastric cancer (CMS/HCC)    • Gastric mass    • Gastric ulcer    • History of transfusion     no reaction 2018   • Wears glasses      Past Surgical  "History:   Procedure Laterality Date   • CERVICAL BIOPSY  W/ LOOP ELECTRODE EXCISION     • DIAGNOSTIC LAPAROSCOPY N/A 3/13/2018    Procedure: DIAGNOSTIC LAPAROSCOPY WITH BIOPSY, LYSIS OF ADHESIONS;  Surgeon: Nicole Crooks MD;  Location:  [facility];  Service: General   • ENDOSCOPY     • GASTRECTOMY N/A 6/29/2018    Procedure: SUBTOTAL GASTRECTOMY;  Surgeon: Nicole Crooks MD;  Location:  [facility];  Service: General   • STOMACH SURGERY  06/09/2018    For removal of mass   • TUBAL ABDOMINAL LIGATION     • VENOUS ACCESS DEVICE (PORT) INSERTION     • VENOUS ACCESS DEVICE (PORT) REMOVAL N/A 12/21/2018    Procedure: PORT REMOVAL;  Surgeon: Nicole Crooks MD;  Location:  [facility];  Service: General       No Known Allergies    Family History and Social History reviewed and changed as necessary    REVIEW OF SYSTEM:   No new concerns    PHYSICAL EXAM:  General: Well-developed, well-nourished female in no distress    Vitals:    08/20/21 1412   BP: 137/80   Pulse: 85   Resp: 16   Temp: 97.8 °F (36.6 °C)   TempSrc: Temporal   SpO2: 96%   Weight: 79.7 kg (175 lb 9.6 oz)   Height: 157.5 cm (62\")     Vitals:    08/20/21 1412   PainSc: 0-No pain          ECOG score: 0           Vitals reviewed.  Labs and CT scans reviewed.    Recent Results (from the past 336 hour(s))   Comprehensive Metabolic Panel    Collection Time: 08/09/21  3:24 PM    Specimen: Blood   Result Value Ref Range    Glucose 92 65 - 99 mg/dL    BUN 18 6 - 20 mg/dL    Creatinine 0.97 0.57 - 1.00 mg/dL    Sodium 138 136 - 145 mmol/L    Potassium 4.6 3.5 - 5.2 mmol/L    Chloride 103 98 - 107 mmol/L    CO2 25.0 22.0 - 29.0 mmol/L    Calcium 9.6 8.6 - 10.5 mg/dL    Total Protein 7.6 6.0 - 8.5 g/dL    Albumin 4.50 3.50 - 5.20 g/dL    ALT (SGPT) 13 1 - 33 U/L    AST (SGOT) 22 1 - 32 U/L    Alkaline Phosphatase 81 39 - 117 U/L    Total Bilirubin 0.2 0.0 - 1.2 mg/dL    eGFR  African Amer 72 >60 mL/min/1.73    Globulin 3.1 gm/dL    A/G Ratio 1.5 g/dL    BUN/Creatinine Ratio " 18.6 7.0 - 25.0    Anion Gap 10.0 5.0 - 15.0 mmol/L   Ferritin    Collection Time: 08/09/21  3:24 PM    Specimen: Blood   Result Value Ref Range    Ferritin 25.31 13.00 - 150.00 ng/mL   Iron Profile    Collection Time: 08/09/21  3:24 PM    Specimen: Blood   Result Value Ref Range    Iron 195 (H) 37 - 145 mcg/dL    Iron Saturation 39 20 - 50 %    Transferrin 339 200 - 360 mg/dL    TIBC 505 298 - 536 mcg/dL   CBC Auto Differential    Collection Time: 08/09/21  3:24 PM    Specimen: Blood   Result Value Ref Range    WBC 4.58 3.40 - 10.80 10*3/mm3    RBC 5.09 3.77 - 5.28 10*6/mm3    Hemoglobin 12.8 12.0 - 15.9 g/dL    Hematocrit 42.2 34.0 - 46.6 %    MCV 82.9 79.0 - 97.0 fL    MCH 25.1 (L) 26.6 - 33.0 pg    MCHC 30.3 (L) 31.5 - 35.7 g/dL    RDW 20.6 (H) 12.3 - 15.4 %    RDW-SD 60.6 (H) 37.0 - 54.0 fl    MPV 10.9 6.0 - 12.0 fL    Platelets 238 140 - 450 10*3/mm3    Neutrophil % 49.4 42.7 - 76.0 %    Lymphocyte % 35.8 19.6 - 45.3 %    Monocyte % 11.1 5.0 - 12.0 %    Eosinophil % 2.6 0.3 - 6.2 %    Basophil % 0.9 0.0 - 1.5 %    Immature Grans % 0.2 0.0 - 0.5 %    Neutrophils, Absolute 2.26 1.70 - 7.00 10*3/mm3    Lymphocytes, Absolute 1.64 0.70 - 3.10 10*3/mm3    Monocytes, Absolute 0.51 0.10 - 0.90 10*3/mm3    Eosinophils, Absolute 0.12 0.00 - 0.40 10*3/mm3    Basophils, Absolute 0.04 0.00 - 0.20 10*3/mm3    Immature Grans, Absolute 0.01 0.00 - 0.05 10*3/mm3    nRBC 0.0 0.0 - 0.2 /100 WBC   Scan Slide    Collection Time: 08/09/21  3:24 PM    Specimen: Blood   Result Value Ref Range    Sonia Cells Slight/1+ None Seen    Ovalocytes Mod/2+ None Seen    Schistocytes Slight/1+ None Seen    WBC Morphology Normal Normal    Platelet Morphology Normal Normal     CT Chest With Contrast Diagnostic    Result Date: 8/12/2021  Stable appearance of the chest, abdomen and pelvis. Some chronic change is stable within the lung bases bilaterally. There is no evidence of local recurrence or metastatic disease.  D:  08/11/2021 E:  08/11/2021   This report was finalized on 8/12/2021 5:57 PM by Dr. Hayley Tan MD.      CT Abdomen Pelvis With Contrast    Result Date: 8/12/2021  Stable appearance of the chest, abdomen and pelvis. Some chronic change is stable within the lung bases bilaterally. There is no evidence of local recurrence or metastatic disease.  D:  08/11/2021 E:  08/11/2021  This report was finalized on 8/12/2021 5:57 PM by Dr. Hayley Tan MD.            ASSESSMENT & PLAN:  1.  Gastric carcinoma history  2.  Microcytic anemia    Discussion: Astrid has been thoroughly scoped with EGD and colonoscopy that were negative.  She is on oral iron twice daily every other day, anemia improving with hemoglobin now normal at 12.8, hematocrit 42.2%.  Ferritin 25.31 and serum iron at 195 with iron saturation 39% and TIBC 505.  She will continue on oral iron.  No evidence of disease on current CT scans.  He will now return to surveillance CT scans again in 6 months with lab testing prior to return with CBC, CMP, ferritin and iron profile and I have ordered those today.    We discussed the COVID-19 vaccination today as Astrid has not received her vaccine.  She states that she is not interested in taking the vaccine it is not something that she personally believes.  I discussed with her that he has behind the vaccine and encouraged her to reconsider.    I spent 30 minutes caring for Astrid on this date of service. This time includes time spent by me in the following activities: preparing for the visit, reviewing tests, obtaining and/or reviewing a separately obtained history, performing a medically appropriate examination and/or evaluation, counseling and educating the patient/family/caregiver, ordering medications, tests, or procedures and documenting information in the medical record.     Kathy Salazar, APRN    08/20/2021

## 2022-02-14 ENCOUNTER — TELEPHONE (OUTPATIENT)
Dept: ONCOLOGY | Facility: CLINIC | Age: 57
End: 2022-02-14

## 2022-02-14 NOTE — TELEPHONE ENCOUNTER
Caller: DESTINY COURTNEY    Relationship: SELF     Best call back number: [phone]        Who are you requesting to speak with (clinical staff, provider,  specific staff member): OFFICE     Do you know the name of the person who called:     What was the call regarding:   WANTED TO LET KNOW IS WORKING WITH [facility] SCHEDULING TO GET CT APPT MOVED UP SINCE IT IS SCHEDULED AT 4PM ON SAME DAY SEES DR BARBOSA FOR FOLLOW UP AT 1PM 02/18 Friday AND NEEDING TO HAVE PRIOR TO SEEING DR BARBOSA .         I WARM TRANSFERRED DESTINY TO [facility] IN [facility] SCHEDULING TO FURTHER ASSIT. AND R/S CT TO [facility]LI[facility].     Do you require a callback: NO

## 2022-02-17 ENCOUNTER — HOSPITAL ENCOUNTER (OUTPATIENT)
Dept: CT IMAGING | Facility: HOSPITAL | Age: 57
Discharge: HOME | End: 2022-02-17
Admitting: NURSE PRACTITIONER

## 2022-02-17 DIAGNOSIS — E61.1 IRON DEFICIENCY: ICD-10-CM

## 2022-02-17 DIAGNOSIS — Z85.028 HISTORY OF GASTRIC CANCER: ICD-10-CM

## 2022-02-17 LAB
ALBUMIN SERPL-MCNC: 4.3 G/DL (ref 3.5–5.2)
ALBUMIN/GLOB SERPL: 1.4 G/DL
ALP SERPL-CCNC: 88 U/L (ref 39–117)
ALT SERPL W P-5'-P-CCNC: 15 U/L (ref 1–33)
ANION GAP SERPL CALCULATED.3IONS-SCNC: 9 MMOL/L (ref 5–15)
AST SERPL-CCNC: 21 U/L (ref 1–32)
BASOPHILS # BLD AUTO: 0.03 10*3/MM3 (ref 0–0.2)
BASOPHILS NFR BLD AUTO: 0.6 % (ref 0–1.5)
BILIRUB SERPL-MCNC: 0.2 MG/DL (ref 0–1.2)
BUN SERPL-MCNC: 16 MG/DL (ref 6–20)
BUN/CREAT SERPL: 17.6 (ref 7–25)
CALCIUM SPEC-SCNC: 9.4 MG/DL (ref 8.6–10.5)
CHLORIDE SERPL-SCNC: 102 MMOL/L (ref 98–107)
CO2 SERPL-SCNC: 27 MMOL/L (ref 22–29)
CREAT SERPL-MCNC: 0.91 MG/DL (ref 0.57–1)
DEPRECATED RDW RBC AUTO: 44.8 FL (ref 37–54)
EOSINOPHIL # BLD AUTO: 0.21 10*3/MM3 (ref 0–0.4)
EOSINOPHIL NFR BLD AUTO: 4.2 % (ref 0.3–6.2)
ERYTHROCYTE [DISTWIDTH] IN BLOOD BY AUTOMATED COUNT: 13.8 % (ref 12.3–15.4)
FERRITIN SERPL-MCNC: 29.8 NG/ML (ref 13–150)
GFR SERPL CREATININE-BSD FRML MDRD: 77 ML/MIN/1.73
GLOBULIN UR ELPH-MCNC: 3 GM/DL
GLUCOSE SERPL-MCNC: 87 MG/DL (ref 65–99)
HCT VFR BLD AUTO: 43.4 % (ref 34–46.6)
HGB BLD-MCNC: 13.4 G/DL (ref 12–15.9)
IMM GRANULOCYTES # BLD AUTO: 0.01 10*3/MM3 (ref 0–0.05)
IMM GRANULOCYTES NFR BLD AUTO: 0.2 % (ref 0–0.5)
IRON 24H UR-MRATE: 95 MCG/DL (ref 37–145)
IRON SATN MFR SERPL: 21 % (ref 20–50)
LYMPHOCYTES # BLD AUTO: 1.68 10*3/MM3 (ref 0.7–3.1)
LYMPHOCYTES NFR BLD AUTO: 33.7 % (ref 19.6–45.3)
MCH RBC QN AUTO: 27.1 PG (ref 26.6–33)
MCHC RBC AUTO-ENTMCNC: 30.9 G/DL (ref 31.5–35.7)
MCV RBC AUTO: 87.7 FL (ref 79–97)
MONOCYTES # BLD AUTO: 0.53 10*3/MM3 (ref 0.1–0.9)
MONOCYTES NFR BLD AUTO: 10.6 % (ref 5–12)
NEUTROPHILS NFR BLD AUTO: 2.52 10*3/MM3 (ref 1.7–7)
NEUTROPHILS NFR BLD AUTO: 50.7 % (ref 42.7–76)
NRBC BLD AUTO-RTO: 0 /100 WBC (ref 0–0.2)
PLATELET # BLD AUTO: 245 10*3/MM3 (ref 140–450)
PMV BLD AUTO: 10.9 FL (ref 6–12)
POTASSIUM SERPL-SCNC: 4.5 MMOL/L (ref 3.5–5.2)
PROT SERPL-MCNC: 7.3 G/DL (ref 6–8.5)
RBC # BLD AUTO: 4.95 10*6/MM3 (ref 3.77–5.28)
SODIUM SERPL-SCNC: 138 MMOL/L (ref 136–145)
TIBC SERPL-MCNC: 451 MCG/DL (ref 298–536)
TRANSFERRIN SERPL-MCNC: 303 MG/DL (ref 200–360)
WBC NRBC COR # BLD: 4.98 10*3/MM3 (ref 3.4–10.8)

## 2022-02-17 PROCEDURE — 71260 CT THORAX DX C+: CPT

## 2022-02-17 PROCEDURE — 74177 CT ABD & PELVIS W/CONTRAST: CPT

## 2022-02-17 PROCEDURE — 80053 COMPREHEN METABOLIC PANEL: CPT | Performed by: NURSE PRACTITIONER

## 2022-02-17 PROCEDURE — 25010000002 IOPAMIDOL 61 % SOLUTION: Performed by: NURSE PRACTITIONER

## 2022-02-17 PROCEDURE — 82728 ASSAY OF FERRITIN: CPT | Performed by: NURSE PRACTITIONER

## 2022-02-17 PROCEDURE — 85025 COMPLETE CBC W/AUTO DIFF WBC: CPT | Performed by: NURSE PRACTITIONER

## 2022-02-17 PROCEDURE — 84466 ASSAY OF TRANSFERRIN: CPT | Performed by: NURSE PRACTITIONER

## 2022-02-17 PROCEDURE — 83540 ASSAY OF IRON: CPT | Performed by: NURSE PRACTITIONER

## 2022-02-17 RX ADMIN — IOPAMIDOL 80 ML: 612 INJECTION, SOLUTION INTRAVENOUS at 15:25

## 2022-02-18 ENCOUNTER — OFFICE VISIT (OUTPATIENT)
Dept: ONCOLOGY | Facility: CLINIC | Age: 57
End: 2022-02-18

## 2022-02-18 ENCOUNTER — APPOINTMENT (OUTPATIENT)
Dept: CT IMAGING | Facility: HOSPITAL | Age: 57
End: 2022-02-18

## 2022-02-18 VITALS
SYSTOLIC BLOOD PRESSURE: 119 MMHG | BODY MASS INDEX: 34.5 KG/M2 | TEMPERATURE: 97.5 F | RESPIRATION RATE: 16 BRPM | WEIGHT: 187.5 LBS | HEART RATE: 79 BPM | DIASTOLIC BLOOD PRESSURE: 79 MMHG | HEIGHT: 62 IN | OXYGEN SATURATION: 99 %

## 2022-02-18 DIAGNOSIS — C16.2 MALIGNANT NEOPLASM OF BODY OF STOMACH: Primary | Chronic | ICD-10-CM

## 2022-02-18 PROCEDURE — 99214 OFFICE O/P EST MOD 30 MIN: CPT | Performed by: INTERNAL MEDICINE

## 2022-02-18 NOTE — PROGRESS NOTES
CHIEF COMPLAINT: Follow-up gastric carcinoma    Problem List:  Oncology/Hematology History Overview Note   1.  Gastric cancer:  53-year-old female with a three-month history of indigestion and upper abdominal pain.  She was started on omeprazole for presumed reflux, symptoms were not relieved.  She underwent an EGD on 2/6/2018 with Dr. Matt that revealed a large, friable and ulcerated mass in the body of the stomach.  Biopsies returned moderately to poorly differentiated adenocarcinoma.  Staging studies including CT scan of the chest abdomen and pelvis were negative for distant disease.  She underwent diagnostic laparoscopic with peritoneal washings to complete staging on 3/13/2018.  Baseline CBC 3/9/2018 WBC 4820, hemoglobin 8.5, hematocrit 28.9%, platelet count 351,000, MCV 64.8, MCH 19.1, MCHC 29.4.  CEA normal at less than 0.50.  HER-2/erik testing from gastric body biopsy was negative.  CMP was normal other than for serum calcium slightly decreased at 8.6.  Received FLOT x8 4/5/2018 through 9/27/2018.  Genetic testing negative.  6/29/2018 subtotal gastrectomy showed 0.7 cm, 2 out of 10 node positive, moderately differentiated adenocarcinoma of the stomach pathologic T2 N1 M0 stage IIa.  Had PET scan 11/25/2019 showing right supraclavicular mediastinal and hilar adenopathy and celiac axis suggestive of terrance metastases and the patient opted out of further immunotherapy or chemotherapy.  Subsequent CAT scans showed no such enlarging nodes.  Repeat EGD 6/9/2021 showed normal anastomosis and no recurrence with colonoscopy showing internal hemorrhoids.  NCCN guideline recommendations for follow-up…  • H&P every 3-6 months through March 2020 and then every 6-12 months until March 2023 and then annually thereafter  • CBC and CMP as clinically indicated  • For subtotal gastrectomy, EGD as clinically indicated  • CT chest abdomen pelvis every 6-12 months until March 2020 and then annually out to March 2023 and/or can  consider FDG PET as clinically indicated.  • Monitor for nutritional deficiencies of B12 and iron though less likely with subtotal gastrectomy and treat as indicated.    Two.  Iron deficiency anemia: Patient with microcytic anemia noted at visit on 5/20/2021, referred to Dr. Matt for endoscopic evaluation.  She underwent EGD on 6/9/2021 that was negative and also colonoscopy on 7/2/2021 that was negative other than for internal hemorrhoids and diverticulosis without bleeding.  She was started on oral iron.     Malignant neoplasm of body of stomach (HCC)   2/6/2018 Initial Diagnosis    Gastric cancer     2/6/2018 Procedure    EGD revealed gastric ulcerated mass in body of stomach.  Pathology returned:   Final Diagnosis    1. GASTRIC BODY BIOPSY:  Invasive moderate to poorly differentiated adenocarcinoma with ulceration (see comment).   2. GASTRIC ANTRUM BIOPSY:  Minimal chronic gastritis without activity.  No intestinal metaplasia or dysplasia identified.  No Helicobacter pylori-like organisms identified on routine stains.   HER2/erik testing negative  FLUORESCENCE IN-SITU HYBRIDIZATION (FISH) REPORT:  Negative/Not Amplified  HER2/WILL-17 Ratio: 1.3       2/9/2018 Imaging    CT abdomen/pelvis IMPRESSION:     There is mild thickening and prominence of the mid gastric wall of the  body of the stomach, middle third.     Extragastric mass is not identified. The lesser sac and retrogastric  spaces are clear.     Visceral tumor, adenopathy, stranding or caking is not currently  identified as described. There is no retroperitoneal or retrogastric  adenopathy. The lower lung zones are clear with no evidence of reactive  pleural effusion or occult mass. There is no liver metastasis and no  other acute focal CT abnormality is identified within the abdomen or  pelvis.     Incidental note is made of a nonobstructing stone right kidney right  lower pole.     3/9/2018 Imaging    CT Chest IMPRESSION:  No acute intrathoracic  "abnormality. No definite evidence of  metastatic disease.     3/13/2018 Procedure    Diagnostic laproscopy with biopsy and lysis of adhesions.  Pathology returned:  Final Diagnosis   PERITONEAL NODULE, EXCISIONAL BIOPSY:  Benign fibrotic nodule. (See comment)  JFJ/klb    Electronically signed by Boaz Jerez MD on 3/15/2018 at 1624   Comment See result below    The biopsy shows a lobulated fibrotic nodule with some intermixed lymphoid cells. The nodule does not appear neoplastic. One cannot entirely exclude a \"burned out\" lymph node, or other implant. There is no evidence of carcinoma.      Abdominal wall washings benign:  Final Diagnosis    1. ABDOMINAL WASH, WALL:  Negative for malignancy.  Scant cellularity; chronic inflammatory cells and scattered benign mesothelial cells.   2. ABDOMINAL WASH, WALL:  Negative for malignancy.  Scant cellularity; chronic inflammatory cells and scattered benign mesothelial cells.   3. ABDOMINAL WASH, WALL:  Negative for malignancy.  Benign mesothelial cells and mixed inflammatory cells.            4/5/2018 - 9/27/2018 Chemotherapy    OP GASTRIC FLOT OXALIplatin / Leucovorin / Fluorouracil/Taxotere  x8     5/9/2018 Genetic Testing    Genetic testing negative for cancer next panel     5/17/2018 Imaging    CT chest abdomen and pelvis showed Shrinkage of the gastric tumor with no evidence of metastasis     6/29/2018 Surgery    Surgery       Procedure:  Subtotal gastrectomy showing 0.7 cm 2 out of 10 node positive moderately differentiated adenocarcinoma of the stomach.  YpT2 N1 M0 stage IIa     10/29/2018 Imaging    CT chest abdomen pelvis with contrast shows no evidence of metastasis or recurrence but does have bilateral basilar pulmonary infiltrates.  Was started on Levaquin 10/19/18.     4/19/2019 Imaging    CT chest, abdomen and pelvis IMPRESSION:  1. Considerable atelectasis at the lung bases and/or scarring similar to  prior without focal consolidation or effusion.  2. Stable " appearance of the abdomen and pelvis without evidence for  recurrent disease; specifically, no new soft tissue nodular enhancement  with stable appearance of the surgical margin subtotal gastrectomy and  patulous appearance of the distal esophagus similar to prior. No new  peritoneal reticulation or fluid.     7/8/2019 Procedure    EGD with Dr. Matt, impression: Esophagitis.  Subtotal gastrectomy with normal-appearing mucosa.     11/8/2019 Imaging    CT chest abdomen pelvis with contrast shows enlarging paratracheal nodes     11/25/2019 Progression    PET/CT IMPRESSION:  There is extensive hypermetabolic activity seen within the  right supraclavicular region, mediastinum and hilar regions as well as  centrally within the celiac axis. Findings all suggesting diffuse  metastatic disease in the lymph nodes within the chest and upper  Abdomen.    Patient opted out of immunotherapy or further chemotherapy wanted to go with watchful waiting     5/1/2020 Imaging    -5/1/2020 CT chest abdomen pelvis showed stable postsurgical changes from subtotal gastrectomy with no soft tissue enhancing mass, fluid collection, or inflammation.  There are stable mildly enlarged supraclavicular lymph nodes but no progression of metastatic involvement compared to the imaging of 1/31/2020 11/2/2020 Imaging    CT chest, abdomen and pelvis IMPRESSION:  Stable scarring at the lung bases bilaterally. Patient again  status post partial gastrectomy. No evidence of local recurrence or  metastatic disease. No progression of disease. Findings are all stable.        5/7/2021 Imaging    -5/7/2021 CT chest abdomen pelvis with contrast shows no evidence of recurrence and hemoglobin 10.4 with MCV 78.5 down from 12.5 in November with MCV 87 at that junction.  CMP unremarkable.     5/20/2021 -  Other Event    -5/20/2021 ferritin low end of normal 14.75 with iron low at 31 and saturation low 6% with elevated total iron binding capacity 560 and  elevated transferrin 376 commensurate with iron deficiency anemia.       6/9/2021 -  Other Event      -6/9/2021 reports the patient from Dr. Matt showed no Helicobacter pylori, intestinal metaplasia, or dysplasia.  Anastomosis normal.  No gross evidence of recurrent gastric cancer.  Recommends next step of colonoscopy.     7/2/2021 Procedure    Colonoscopy: Internal hemorrhoids and diverticulosis without bleeding, otherwise normal.     8/9/2021 Imaging    CT chest, abdomen and pelvis IMPRESSION:  Stable appearance of the chest, abdomen and pelvis. Some  chronic change is stable within the lung bases bilaterally. There is no  evidence of local recurrence or metastatic disease.     2/17/2022 Imaging    -2/17/2022 CT chest abdomen pelvis with contrast shows chronic interstitial changes lower lungs compared to May 2021 with no evidence of recurrent malignancy or metastasis in the abdomen or pelvis.  CBC, CMP, ferritin, iron profile entirely normal.         HISTORY OF PRESENT ILLNESS:  The patient is a 57 y.o. female, here for follow up on management of gastric carcinoma.  No new somatic complaints.    Past Medical History:   Diagnosis Date   • Abdominal pain    • Acid reflux    • Arthritis    • Gastric cancer (HCC)    • Gastric mass    • Gastric ulcer    • History of transfusion     no reaction 2018   • Wears glasses      Past Surgical History:   Procedure Laterality Date   • CERVICAL BIOPSY  W/ LOOP ELECTRODE EXCISION     • DIAGNOSTIC LAPAROSCOPY N/A 3/13/2018    Procedure: DIAGNOSTIC LAPAROSCOPY WITH BIOPSY, LYSIS OF ADHESIONS;  Surgeon: Nicole Crooks MD;  Location:  [facility];  Service: General   • ENDOSCOPY     • GASTRECTOMY N/A 6/29/2018    Procedure: SUBTOTAL GASTRECTOMY;  Surgeon: Nicole Crooks MD;  Location:  [facility];  Service: General   • STOMACH SURGERY  06/09/2018    For removal of mass   • TUBAL ABDOMINAL LIGATION     • VENOUS ACCESS DEVICE (PORT) INSERTION     • VENOUS ACCESS DEVICE (PORT) REMOVAL N/A  "12/21/2018    Procedure: PORT REMOVAL;  Surgeon: Nciole Crooks MD;  Location: [facility];  Service: General       No Known Allergies    Family History and Social History reviewed and changed as necessary    REVIEW OF SYSTEM:   No new somatic complaints    PHYSICAL EXAM:  Lungs clear heart regular rate and rhythm    Vitals:    02/18/22 1312   BP: 119/79   Pulse: 79   Resp: 16   Temp: 97.5 °F (36.4 °C)   TempSrc: Temporal   SpO2: 99%   Weight: 85 kg (187 lb 8 oz)   Height: 157.5 cm (62\")     Vitals:    02/18/22 1312   PainSc: 0-No pain          ECOG score: 0           Vitals reviewed.        Lab Results   Component Value Date    HGB 13.4 02/17/2022    HCT 43.4 02/17/2022    MCV 87.7 02/17/2022     02/17/2022    WBC 4.98 02/17/2022    NEUTROABS 2.52 02/17/2022    LYMPHSABS 1.68 02/17/2022    MONOSABS 0.53 02/17/2022    EOSABS 0.21 02/17/2022    BASOSABS 0.03 02/17/2022       Lab Results   Component Value Date    GLUCOSE 87 02/17/2022    BUN 16 02/17/2022    CREATININE 0.91 02/17/2022     02/17/2022    K 4.5 02/17/2022     02/17/2022    CO2 27.0 02/17/2022    CALCIUM 9.4 02/17/2022    PROTEINTOT 7.3 02/17/2022    ALBUMIN 4.30 02/17/2022    BILITOT 0.2 02/17/2022    ALKPHOS 88 02/17/2022    AST 21 02/17/2022    ALT 15 02/17/2022             ASSESSMENT & PLAN:  1.  Gastric cancer:  53-year-old female with a three-month history of indigestion and upper abdominal pain.  She was started on omeprazole for presumed reflux, symptoms were not relieved.  She underwent an EGD on 2/6/2018 with Dr. Matt that revealed a large, friable and ulcerated mass in the body of the stomach.  Biopsies returned moderately to poorly differentiated adenocarcinoma.  Staging studies including CT scan of the chest abdomen and pelvis were negative for distant disease.  She underwent diagnostic laparoscopic with peritoneal washings to complete staging on 3/13/2018.  Baseline CBC 3/9/2018 WBC 4820, hemoglobin 8.5, hematocrit " 28.9%, platelet count 351,000, MCV 64.8, MCH 19.1, MCHC 29.4.  CEA normal at less than 0.50.  HER-2/erik testing from gastric body biopsy was negative.  CMP was normal other than for serum calcium slightly decreased at 8.6.  Received FLOT x8 4/5/2018 through 9/27/2018.  Genetic testing negative.  6/29/2018 subtotal gastrectomy showed 0.7 cm, 2 out of 10 node positive, moderately differentiated adenocarcinoma of the stomach pathologic T2 N1 M0 stage IIa.  Had PET scan 11/25/2019 showing right supraclavicular mediastinal and hilar adenopathy and celiac axis suggestive of terrance metastases and the patient opted out of further immunotherapy or chemotherapy.  Subsequent CAT scans showed no such enlarging nodes.  Repeat EGD 6/9/2021 showed normal anastomosis and no recurrence with colonoscopy showing internal hemorrhoids.  NCCN guideline recommendations for follow-up…  • H&P every 3-6 months through March 2020 and then every 6-12 months until March 2023 and then annually thereafter  • CBC and CMP as clinically indicated  • For subtotal gastrectomy, EGD as clinically indicated  • CT chest abdomen pelvis every 6-12 months until March 2020 and then annually out to March 2023 and/or can consider FDG PET as clinically indicated.  • Monitor for nutritional deficiencies of B12 and iron though less likely with subtotal gastrectomy and treat as indicated.    2.  Iron deficiency anemia: Patient with microcytic anemia noted at visit on 5/20/2021, referred to Dr. Matt for endoscopic evaluation.  She underwent EGD on 6/9/2021 that was negative and also colonoscopy on 7/2/2021 that was negative other than for internal hemorrhoids and diverticulosis without bleeding.  She was started on oral iron.    Discussion: I reviewed current images and reports of CTs results as outlined above.  No evidence of recurrence.  Blood counts chemistries iron indices etc. all normal.  Oral repeat CBC again every 6 months until a year from now and from  that point forward will do it just as clinically indicated.  No hint of iron deficiency or B12 deficiency which I would find to be unlikely with subtotal gastrectomy.  Follow-up per NCCN guidelines as outlined above.    Total time of care today inclusive of time spent today prior to her arrival reviewing interval labs and data as outlined above and images thereof and during visit translating that to her and after visit arranging for follow-up and imaging as outlined took 30 minutes of patient care time throughout the day today.  Kaden Oswald MD    02/18/2022

## 2022-05-05 ENCOUNTER — TRANSCRIBE ORDERS (OUTPATIENT)
Dept: ADMINISTRATIVE | Facility: HOSPITAL | Age: 57
End: 2022-05-05

## 2022-05-05 DIAGNOSIS — Z12.31 VISIT FOR SCREENING MAMMOGRAM: Primary | ICD-10-CM

## 2022-06-03 ENCOUNTER — HOSPITAL ENCOUNTER (OUTPATIENT)
Dept: MAMMOGRAPHY | Facility: HOSPITAL | Age: 57
Discharge: HOME | End: 2022-06-03
Admitting: OBSTETRICS & GYNECOLOGY

## 2022-06-03 DIAGNOSIS — Z12.31 VISIT FOR SCREENING MAMMOGRAM: ICD-10-CM

## 2022-06-03 PROCEDURE — 77067 SCR MAMMO BI INCL CAD: CPT

## 2022-06-03 PROCEDURE — 77063 BREAST TOMOSYNTHESIS BI: CPT

## 2022-06-03 PROCEDURE — 77067 SCR MAMMO BI INCL CAD: CPT | Performed by: RADIOLOGY

## 2022-06-03 PROCEDURE — 77063 BREAST TOMOSYNTHESIS BI: CPT | Performed by: RADIOLOGY

## 2022-06-20 ENCOUNTER — OFFICE VISIT (OUTPATIENT)
Dept: FAMILY MEDICINE CLINIC | Facility: CLINIC | Age: 57
End: 2022-06-20

## 2022-06-20 VITALS
TEMPERATURE: 98.7 F | HEIGHT: 62 IN | HEART RATE: 82 BPM | WEIGHT: 183 LBS | DIASTOLIC BLOOD PRESSURE: 84 MMHG | OXYGEN SATURATION: 98 % | BODY MASS INDEX: 33.68 KG/M2 | SYSTOLIC BLOOD PRESSURE: 136 MMHG

## 2022-06-20 DIAGNOSIS — M62.838 MUSCLE SPASM: Primary | ICD-10-CM

## 2022-06-20 PROCEDURE — 99213 OFFICE O/P EST LOW 20 MIN: CPT | Performed by: PHYSICIAN ASSISTANT

## 2022-06-20 RX ORDER — CYCLOBENZAPRINE HCL 10 MG
10 TABLET ORAL 3 TIMES DAILY PRN
Qty: 30 TABLET | Refills: 1 | Status: SHIPPED | OUTPATIENT
Start: 2022-06-20

## 2022-06-20 NOTE — PROGRESS NOTES
New Patient Office Visit      Date: 2022   Patient Name: Astrid Nobles  : 1965   MRN: [id number]     Chief Complaint:    Chief Complaint   Patient presents with   • Shoulder Pain     For last 2 weeks       History of Present Illness: Astrid Nobles is a 57 y.o. female who is here today to establish care.  She is having problems with right shoulder pain.  She denies any known injury.  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    Note history of gastric cancer.  She is doing well.    Subjective      Review of Systems:   Review of Systems   Constitutional: Negative for fatigue and fever.   HENT: Negative for trouble swallowing.    Eyes: Negative for visual disturbance.   Respiratory: Negative for shortness of breath.    Cardiovascular: Negative for chest pain and leg swelling.   Gastrointestinal: Negative for abdominal pain.   Musculoskeletal: Positive for arthralgias, neck pain and neck stiffness.        Past Medical History:   Past Medical History:   Diagnosis Date   • Abdominal pain    • Acid reflux    • Arthritis    • Gastric cancer (HCC)    • Gastric mass    • Gastric ulcer    • History of transfusion     no reaction    • Wears glasses        Past Surgical History:   Past Surgical History:   Procedure Laterality Date   • CERVICAL BIOPSY  W/ LOOP ELECTRODE EXCISION     • DIAGNOSTIC LAPAROSCOPY N/A 3/13/2018    Procedure: DIAGNOSTIC LAPAROSCOPY WITH BIOPSY, LYSIS OF ADHESIONS;  Surgeon: Nicole Crooks MD;  Location: [facility] OR;  Service: General   • ENDOSCOPY     • GASTRECTOMY N/A 2018    Procedure: SUBTOTAL GASTRECTOMY;  Surgeon: Nicole Crooks MD;  Location: [facility] OR;  Service: General   • STOMACH SURGERY  2018    For removal of mass   • TUBAL ABDOMINAL LIGATION     • VENOUS ACCESS DEVICE (PORT) INSERTION     • VENOUS ACCESS DEVICE (PORT) REMOVAL N/A 2018    Procedure: PORT REMOVAL;  Surgeon: Nicoel Crooks MD;  Location: [facility]  OR;  Service: General       Family History:   Family History   Problem Relation Age of Onset   • Hypertension Mother    • Stomach cancer Sister    • No Known Problems Brother    • No Known Problems Daughter    • No Known Problems Son    • No Known Problems Maternal Grandmother    • No Known Problems Paternal Grandmother    • Breast cancer Maternal Aunt    • No Known Problems Paternal Aunt    • Ovarian cancer Other        Social History:   Social History     Socioeconomic History   • Marital status: Single   Tobacco Use   • Smoking status: Never Smoker   • Smokeless tobacco: Never Used   Substance and Sexual Activity   • Alcohol use: Yes     Comment: occ, nothing weekly    • Drug use: No   • Sexual activity: Defer       Medications:     Current Outpatient Medications:   •  ascorbic acid (VITAMIN C) 1000 MG tablet, Take 1,000 mg by mouth Daily., Disp: , Rfl:   •  BIOTIN PO, Take 1 tablet by mouth., Disp: , Rfl:   •  ferrous sulfate 325 (65 FE) MG tablet, 325 mg p.o. twice daily every other day with 500 mg Vitamin C, Disp: 30 tablet, Rfl: 11  •  sodium-potassium-magnesium sulfates (Suprep Bowel Prep Kit) 17.5-3.13-1.6 GM/177ML solution oral solution, MUST HAVE . DON'T EAT DAY BEFORE APPT. READ INSTRUCTIONS MAILED TO YOU 7 DAYS BEFORE APPT. DIDN'T GET INSTRUCTIONS? CALL [phone]., Disp: 354 mL, Rfl: 0  •  cyclobenzaprine (FLEXERIL) 10 MG tablet, Take 1 tablet by mouth 3 (Three) Times a Day As Needed for Muscle Spasms., Disp: 30 tablet, Rfl: 1  •  diclofenac (VOLTAREN) 50 MG EC tablet, Take 1 tablet by mouth 2 (Two) Times a Day., Disp: 30 tablet, Rfl: 1  No current facility-administered medications for this visit.    Facility-Administered Medications Ordered in Other Visits:   •  heparin flush (porcine) 100 UNIT/ML injection 500 Units, 500 Units, Intravenous, PRN, Kaden Oswald MD  •  sodium chloride 0.9 % flush 10 mL, 10 mL, Intravenous, PRNMargret Lee G, MD  •  sodium chloride 0.9 % infusion, 100 mL/hr,  "Intravenous, Continuous, Kathy Salazar, APRN, Last Rate: 100 mL/hr at 08/09/18 1125, 100 mL/hr at 08/09/18 1125    Allergies:   No Known Allergies    Objective     Vital Signs:   Vitals:    06/20/22 1605   BP: 136/84   Pulse: 82   Temp: 98.7 °F (37.1 °C)   SpO2: 98%   Weight: 83 kg (183 lb)   Height: 157.5 cm (62.01\")   PainSc: 0-No pain     Body mass index is 33.46 kg/m².   BMI is >= 30 and <35. (Class 1 Obesity). The following options were offered after discussion;: weight loss educational material (shared in after visit summary)      Physical Exam:   Physical Exam  Vitals and nursing note reviewed.   Constitutional:       Appearance: Normal appearance.   HENT:      Head: Normocephalic and atraumatic.      Nose: Nose normal.      Mouth/Throat:      Mouth: Mucous membranes are moist.      Pharynx: Oropharynx is clear.   Cardiovascular:      Rate and Rhythm: Normal rate and regular rhythm.   Pulmonary:      Effort: Pulmonary effort is normal.      Breath sounds: Normal breath sounds.   Musculoskeletal:      Cervical back: Neck supple. Tenderness present.      Right lower leg: No edema.      Left lower leg: No edema.      Comments: Tenderness to palpation over the right trapezius muscle.  This is in spasm quite significantly.  No rash.  Range of motion of the right shoulder is within normal limits, no rotator cuff weakness or pain is noted.   Lymphadenopathy:      Cervical: No cervical adenopathy.   Neurological:      Mental Status: She is alert.            Assessment / Plan      Assessment/Plan:   Diagnoses and all orders for this visit:    1. Muscle spasm (Primary)  -     diclofenac (VOLTAREN) 50 MG EC tablet; Take 1 tablet by mouth 2 (Two) Times a Day.  Dispense: 30 tablet; Refill: 1  -     cyclobenzaprine (FLEXERIL) 10 MG tablet; Take 1 tablet by mouth 3 (Three) Times a Day As Needed for Muscle Spasms.  Dispense: 30 tablet; Refill: 1         Will start diclofenac and cyclobenzaprine as directed.  I am also going " to give her some neck stretching and other exercises to help with the spasm.  May alternate heat and ice.  If symptoms do not improve we may need to consider formal physical therapy.  She is also overdue for physical.  She will schedule this as well.      Follow Up:   No follow-ups on file.    Manasa Bermudez PA-C   [facility]

## 2022-06-21 ENCOUNTER — LAB (OUTPATIENT)
Dept: LAB | Facility: HOSPITAL | Age: 57
End: 2022-06-21

## 2022-06-21 DIAGNOSIS — R79.89 ELEVATED TSH: ICD-10-CM

## 2022-06-21 DIAGNOSIS — E78.2 HYPERLIPIDEMIA, MIXED: ICD-10-CM

## 2022-06-21 DIAGNOSIS — R79.89 ELEVATED TSH: Primary | ICD-10-CM

## 2022-06-22 LAB
25(OH)D3+25(OH)D2 SERPL-MCNC: 39.5 NG/ML (ref 30–100)
ALBUMIN SERPL-MCNC: 4.2 G/DL (ref 3.5–5.2)
ALBUMIN/GLOB SERPL: 1.6 G/DL
ALP SERPL-CCNC: 85 U/L (ref 39–117)
ALT SERPL-CCNC: 12 U/L (ref 1–33)
AST SERPL-CCNC: 18 U/L (ref 1–32)
BILIRUB SERPL-MCNC: 0.3 MG/DL (ref 0–1.2)
BUN SERPL-MCNC: 12 MG/DL (ref 6–20)
BUN/CREAT SERPL: 14.3 (ref 7–25)
CALCIUM SERPL-MCNC: 9.2 MG/DL (ref 8.6–10.5)
CHLORIDE SERPL-SCNC: 107 MMOL/L (ref 98–107)
CHOLEST SERPL-MCNC: 214 MG/DL (ref 0–200)
CO2 SERPL-SCNC: 23.6 MMOL/L (ref 22–29)
CREAT SERPL-MCNC: 0.84 MG/DL (ref 0.57–1)
EGFRCR SERPLBLD CKD-EPI 2021: 81.2 ML/MIN/1.73
ERYTHROCYTE [DISTWIDTH] IN BLOOD BY AUTOMATED COUNT: 13.6 % (ref 12.3–15.4)
GLOBULIN SER CALC-MCNC: 2.7 GM/DL
GLUCOSE SERPL-MCNC: 107 MG/DL (ref 65–99)
HCT VFR BLD AUTO: 37.9 % (ref 34–46.6)
HDLC SERPL-MCNC: 77 MG/DL (ref 40–60)
HGB BLD-MCNC: 13.2 G/DL (ref 12–15.9)
LDLC SERPL CALC-MCNC: 128 MG/DL (ref 0–100)
MCH RBC QN AUTO: 27.9 PG (ref 26.6–33)
MCHC RBC AUTO-ENTMCNC: 34.8 G/DL (ref 31.5–35.7)
MCV RBC AUTO: 80.1 FL (ref 79–97)
PLATELET # BLD AUTO: 246 10*3/MM3 (ref 140–450)
POTASSIUM SERPL-SCNC: 4.4 MMOL/L (ref 3.5–5.2)
PROT SERPL-MCNC: 6.9 G/DL (ref 6–8.5)
RBC # BLD AUTO: 4.73 10*6/MM3 (ref 3.77–5.28)
SODIUM SERPL-SCNC: 143 MMOL/L (ref 136–145)
T4 FREE SERPL-MCNC: 1.12 NG/DL (ref 0.93–1.7)
TRIGL SERPL-MCNC: 50 MG/DL (ref 0–150)
TSH SERPL DL<=0.005 MIU/L-ACNC: 4.44 UIU/ML (ref 0.27–4.2)
VLDLC SERPL CALC-MCNC: 9 MG/DL (ref 5–40)
WBC # BLD AUTO: 3.86 10*3/MM3 (ref 3.4–10.8)

## 2022-06-24 ENCOUNTER — PATIENT ROUNDING (BHMG ONLY) (OUTPATIENT)
Dept: FAMILY MEDICINE CLINIC | Facility: CLINIC | Age: 57
End: 2022-06-24

## 2022-06-27 ENCOUNTER — TELEPHONE (OUTPATIENT)
Dept: FAMILY MEDICINE CLINIC | Facility: CLINIC | Age: 57
End: 2022-06-27

## 2022-06-27 NOTE — TELEPHONE ENCOUNTER
Caller: Astrid Nobles     Relationship: SELF    Best call back number: [phone]    What is your medical concern? RIGHT SHOULDER PAIN IS NOT RESPONDING TO MEDICATION- WHEN SHE TAKES THE MEDICATION IT TAKES THE PAIN AWAY YET NOT ALL THE PAIN IS GONE. WHEN THE MEDICAITONS WEARS OFF SHE IS IN PAIN AGAIN. 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    PATIENT IS ASKING IF THERE IS ANYTHING SHE CAN DO TO HELP. ASKING HOW LONG IT TAKES FOR THE MEDICATION TO HELP HER.    How long has this issue been going on? SINCE LAST WEEK AND A WEEK OR SO BEFORE SHE CAME IN TO OFFICE TOTALING ABOUT 3 WEEKS    Is your provider already aware of this issue? YES, BUT DOESN'T KNOW THE ISSUE IS ONGOING    Have you been treated for this issue? YES    IF NEW MEDICATIONS ARE CALLED IN- PLEASE SEND TO   [id number] - [place], [place] - [address] AT [facility] [address] [facility] [phone] [facility] [phone] FX    PLEASE CALL PATIENT WITH ADVICE

## 2022-06-27 NOTE — TELEPHONE ENCOUNTER
Next step would be to do physical therapy, and have them work on this shoulder. I do expect it to take several weeks to improve even with the medication.

## 2022-06-27 NOTE — TELEPHONE ENCOUNTER
Called pt, verbalized understanding. Pt states she will give it another week or two to see if the medication will help, if no improvement, she would like a referral to physical therapy.

## 2022-07-06 ENCOUNTER — OFFICE VISIT (OUTPATIENT)
Dept: INTERNAL MEDICINE | Facility: CLINIC | Age: 57
End: 2022-07-06

## 2022-07-06 VITALS
BODY MASS INDEX: 33.49 KG/M2 | TEMPERATURE: 98.5 F | SYSTOLIC BLOOD PRESSURE: 132 MMHG | OXYGEN SATURATION: 98 % | RESPIRATION RATE: 20 BRPM | HEART RATE: 79 BPM | WEIGHT: 182 LBS | HEIGHT: 62 IN | DIASTOLIC BLOOD PRESSURE: 70 MMHG

## 2022-07-06 DIAGNOSIS — M25.511 ACUTE PAIN OF RIGHT SHOULDER: Primary | ICD-10-CM

## 2022-07-06 PROCEDURE — 99213 OFFICE O/P EST LOW 20 MIN: CPT | Performed by: INTERNAL MEDICINE

## 2022-07-06 RX ORDER — MELOXICAM 7.5 MG/1
7.5 TABLET ORAL DAILY
Qty: 30 TABLET | Refills: 2 | Status: SHIPPED | OUTPATIENT
Start: 2022-07-06

## 2022-07-06 RX ORDER — IBUPROFEN, ACETAMINOPHEN 125; 250 MG/1; MG/1
TABLET, FILM COATED ORAL
COMMUNITY
End: 2022-07-06

## 2022-07-06 NOTE — PROGRESS NOTES
Office Note      Date: 2022  Patient Name: Astrid Nobles  MRN: [id number]  : 1965    Chief Complaint   Patient presents with   • Shoulder Pain       History of Present Illness: Astrid Nobles is a 57 y.o. female who presents for Shoulder Pain. Rt shoulder pain x 3 weeks. Diclofenac did not help. Seen at prior PCP for this. No new sx.    Subjective      Review of Systems:   Pertinent review of systems per HPI.    Review of Systems   Constitutional: Negative for activity change, appetite change, chills, diaphoresis, fatigue, fever and unexpected weight change.   HENT: Negative for congestion, dental problem, drooling, ear discharge, ear pain, facial swelling, hearing loss and mouth sores.    Eyes: Negative for pain, discharge and itching.   Respiratory: Negative for apnea, cough, choking, chest tightness and shortness of breath.    Cardiovascular: Negative for chest pain, palpitations and leg swelling.   Gastrointestinal: Negative for abdominal distention, abdominal pain, blood in stool, constipation and diarrhea.   Endocrine: Negative for cold intolerance, heat intolerance, polydipsia and polyuria.   Genitourinary: Negative for difficulty urinating, dysuria, frequency and hematuria.   Musculoskeletal: Positive for arthralgias.   Skin: Negative for color change, pallor, rash and wound.   Allergic/Immunologic: Negative for environmental allergies, food allergies and immunocompromised state.   Neurological: Negative for dizziness, weakness and light-headedness.   Psychiatric/Behavioral: Negative for agitation, behavioral problems, confusion, decreased concentration and self-injury. The patient is not nervous/anxious.    All other systems reviewed and are negative.    No Known Allergies    Objective     Physical Exam:  Vital Signs:   Vitals:    22 1539   BP: 132/70   Pulse: 79   Resp: 20   Temp: 98.5 °F (36.9 °C)   TempSrc: Temporal   SpO2: 98%   Weight: 82.6 kg (182 lb)   Height: 157.5 cm  "(62\")   PainSc:   8      Body mass index is 33.29 kg/m².    Physical Exam  Vitals and nursing note reviewed.   Constitutional:       General: She is not in acute distress.     Appearance: She is well-developed.   HENT:      Head: Normocephalic and atraumatic.      Right Ear: External ear normal.      Left Ear: External ear normal.   Eyes:      General: No scleral icterus.        Right eye: No discharge.         Left eye: No discharge.      Conjunctiva/sclera: Conjunctivae normal.   Cardiovascular:      Rate and Rhythm: Normal rate and regular rhythm.      Heart sounds: Normal heart sounds. No murmur heard.    No friction rub. No gallop.   Pulmonary:      Effort: Pulmonary effort is normal. No respiratory distress.      Breath sounds: Normal breath sounds. No wheezing or rales.   Skin:     General: Skin is warm and dry.      Coloration: Skin is not pale.         Assessment / Plan      Assessment & Plan:    1. Acute pain of right shoulder  rx for mobic, stop diclofenac.  - Ambulatory Referral to Orthopedic Surgery      Abby Nagel MD  07/06/2022   "

## 2022-07-07 ENCOUNTER — PATIENT ROUNDING (BHMG ONLY) (OUTPATIENT)
Dept: INTERNAL MEDICINE | Facility: CLINIC | Age: 57
End: 2022-07-07

## 2022-07-07 NOTE — PROGRESS NOTES
A  my chart message has been sent to the patient for patient rounding with [facility] [place].

## 2022-07-12 ENCOUNTER — OFFICE VISIT (OUTPATIENT)
Dept: ORTHOPEDIC SURGERY | Facility: CLINIC | Age: 57
End: 2022-07-12

## 2022-07-12 VITALS
HEIGHT: 62 IN | WEIGHT: 182.1 LBS | DIASTOLIC BLOOD PRESSURE: 86 MMHG | SYSTOLIC BLOOD PRESSURE: 118 MMHG | BODY MASS INDEX: 33.51 KG/M2

## 2022-07-12 DIAGNOSIS — M54.2 CERVICALGIA: ICD-10-CM

## 2022-07-12 DIAGNOSIS — M25.511 ACUTE PAIN OF RIGHT SHOULDER: Primary | ICD-10-CM

## 2022-07-12 PROCEDURE — 99204 OFFICE O/P NEW MOD 45 MIN: CPT | Performed by: ORTHOPAEDIC SURGERY

## 2022-07-12 RX ORDER — TRAMADOL HYDROCHLORIDE 50 MG/1
50 TABLET ORAL EVERY 6 HOURS PRN
Qty: 30 TABLET | Refills: 0 | Status: SHIPPED | OUTPATIENT
Start: 2022-07-12

## 2022-07-12 NOTE — PROGRESS NOTES
[facility] [place] [facility] [place]        Subjective     Pain of the Right Shoulder      HPI    Astrid Nobles is a 57 y.o. female who presents with new problem of: right shoulder pain.  Onset: atraumatic and gradual in nature. The issue has been ongoing for 3-4 week(s). Pain is a 10/10 on the pain scale. Pain is described as aching, burning and throbbing. Associated symptoms include pain. The pain is worse with any movement of the joint; pain medication and/or NSAID improve the pain. Previous treatments have included: NSAIDS.    I have reviewed the following portions of the patient's history:History of Present Illness and review of systems.      Patient is here today for new problem day regarding her right shoulder.  This began insidiously about 4 weeks ago.  It is anterolateral in location and along the medial border of the scapula.  No history of any trauma or injury.  No numbness or tingling but she does have burning anterolaterally in the arm.  She tried meloxicam without a lot of benefit.  She is here for further evaluation and treatment.        Past Medical History:   Diagnosis Date   • Abdominal pain    • Acid reflux    • Arthritis    • Gastric cancer (HCC)    • Gastric mass    • Gastric ulcer    • History of transfusion     no reaction 2018   • Wears glasses       Past Surgical History:   Procedure Laterality Date   • CERVICAL BIOPSY  W/ LOOP ELECTRODE EXCISION     • DIAGNOSTIC LAPAROSCOPY N/A 3/13/2018    Procedure: DIAGNOSTIC LAPAROSCOPY WITH BIOPSY, LYSIS OF ADHESIONS;  Surgeon: Nicole Crooks MD;  Location: [facility] OR;  Service: General   • ENDOSCOPY     • GASTRECTOMY N/A 6/29/2018    Procedure: SUBTOTAL GASTRECTOMY;  Surgeon: Nicole Crooks MD;  Location: [facility] OR;  Service: General   • STOMACH SURGERY  06/09/2018    For removal of mass   • TUBAL ABDOMINAL LIGATION     • VENOUS ACCESS DEVICE (PORT) INSERTION     • VENOUS ACCESS DEVICE (PORT) REMOVAL N/A 12/21/2018    Procedure: PORT REMOVAL;   Surgeon: Nicole Crooks MD;  Location: [facility];  Service: General      Family History   Problem Relation Age of Onset   • Hypertension Mother    • Cancer Sister    • Stomach cancer Sister    • No Known Problems Brother    • No Known Problems Daughter    • No Known Problems Son    • Cancer Maternal Aunt    • Breast cancer Maternal Aunt    • No Known Problems Paternal Aunt    • No Known Problems Maternal Grandmother    • No Known Problems Paternal Grandmother    • Ovarian cancer Other      Social History     Socioeconomic History   • Marital status: Single   Tobacco Use   • Smoking status: Never Smoker   • Smokeless tobacco: Never Used   Vaping Use   • Vaping Use: Never used   Substance and Sexual Activity   • Alcohol use: Yes     Comment: occ, nothing weekly    • Drug use: No   • Sexual activity: Defer      Current Outpatient Medications on File Prior to Visit   Medication Sig Dispense Refill   • ascorbic acid (VITAMIN C) 1000 MG tablet Take 1,000 mg by mouth Daily.     • BIOTIN PO Take 1 tablet by mouth.     • cyclobenzaprine (FLEXERIL) 10 MG tablet Take 1 tablet by mouth 3 (Three) Times a Day As Needed for Muscle Spasms. 30 tablet 1   • ferrous sulfate 325 (65 FE) MG tablet 325 mg p.o. twice daily every other day with 500 mg Vitamin C 30 tablet 11   • meloxicam (Mobic) 7.5 MG tablet Take 1 tablet by mouth Daily. 30 tablet 2     Current Facility-Administered Medications on File Prior to Visit   Medication Dose Route Frequency Provider Last Rate Last Admin   • heparin flush (porcine) 100 UNIT/ML injection 500 Units  500 Units Intravenous PRN Kaden Oswald MD       • sodium chloride 0.9 % flush 10 mL  10 mL Intravenous PRN Kaden Oswald MD       • sodium chloride 0.9 % infusion  100 mL/hr Intravenous Continuous Kathy Salazar APRN 100 mL/hr at 08/09/18 1125 100 mL/hr at 08/09/18 1125      No Known Allergies       Review of Systems   Constitutional: Negative.    HENT: Negative.    Eyes: Negative.    Respiratory:  "Negative.    Cardiovascular: Negative.    Gastrointestinal: Negative.    Endocrine: Negative.    Genitourinary: Negative.    Musculoskeletal: Positive for arthralgias.   Skin: Negative.    Allergic/Immunologic: Negative.    Neurological: Negative.    Hematological: Negative.    Psychiatric/Behavioral: Negative.         I reviewed the patient's chief complaint, history of present illness, review of systems, past medical history, surgical history, family history, social history, medications and allergy list.        Objective      Physical Exam  /86   Ht 157.5 cm (62.01\")   Wt 82.6 kg (182 lb 1.6 oz)   BMI 33.30 kg/m²     Body mass index is 33.3 kg/m².    General  Mental Status - alert  General Appearance - cooperative, well groomed, not in acute distress  Orientation - Oriented X3  Build & Nutrition - well developed and well nourished  Posture - normal posture  Gait - normal gait       Ortho Exam  Musculoskeletal   Upper Extremity   Right Shoulder     Inspection and Palpation:     Medial border scapular tenderness-moderate    AC Joint Tenderness -none    Sensation is normal    Examination reveals no ecchymosis.        Strength and Tone:    Supraspinatus - 5/5 without pain    External Rotators-5/5 without pain    Infraspinatus - 5/5    Subscapularis - 5/5 without pain    Deltoid - 5/5     Range of Motion      RightShoulder:    Internal Rotation: ROM - L4    External Rotation: AROM - 60 degrees    Elevation through flexion: AROM - 140 degrees        Impingement   Right shoulder    Zendejas-Kahlil impingement test negative    Neer impingement test negative     Functional Testing   Right shoulder    AC crossover adduction test negative    Speeds test negative    Uppercut test negative    O'Briens test negative    Drop arm sign negative    Apprehension relocation negative      Imaging/Studies  Imaging Results (Last 24 Hours)     Procedure Component Value Units Date/Time    XR Shoulder 2+ View Right [[id number]] " Resulted: 07/12/22 1400     Updated: 07/12/22 1401    Narrative:      Right Shoulder X-Ray    Indication: Pain    Study:  AP, axillary lateral, and scapular Y views    Comparison: None    Findings:  No acute fractures are visualized  No bony lesions are visualized.  Normal soft tissue appearance  AC joint: Mild joint space narrowing  Glenohumeral joint: Minimal joint space narrowing  Acromion type: 2      Impression:    No acute bony abnormalities noted  Type II acromion  Mild AC joint space narrowing              Assessment    Assessment:  1. Acute pain of right shoulder    2. Cervicalgia        Plan:  1. Continue over-the-counter medication as needed for discomfort  2. Acute right shoulder pain in the face of cervicalgia--patient carries a lot of tension within her shoulder she tells me.  I suspect that she has an irritated cervical nerve root.  Plan will be for physical therapy on her neck and shoulder.  Her shoulder is not overly irritable today.  Some deep tissue massage and may be some dry needling may be beneficial.  I will see her back in 6 weeks and if she Stai a lot better, consider further imaging of her neck plus or minus shoulder.        Carl Dunbar MD  07/12/22  14:16 EDT      Dictated Utilizing Dragon Dictation.

## 2022-07-25 ENCOUNTER — TREATMENT (OUTPATIENT)
Dept: PHYSICAL THERAPY | Facility: CLINIC | Age: 57
End: 2022-07-25

## 2022-07-25 DIAGNOSIS — M54.2 CERVICALGIA: Primary | ICD-10-CM

## 2022-07-25 PROCEDURE — 97162 PT EVAL MOD COMPLEX 30 MIN: CPT | Performed by: PHYSICAL THERAPIST

## 2022-07-25 PROCEDURE — 97140 MANUAL THERAPY 1/> REGIONS: CPT | Performed by: PHYSICAL THERAPIST

## 2022-07-25 NOTE — PROGRESS NOTES
Physical Therapy Initial Evaluation and Plan of Care    Patient: Astrid Nobles   : 1965  Diagnosis/ICD-10 Code:  No primary diagnosis found.  Referring practitioner: Carl Dunbar,*  Date of Initial Visit: 2022  Today's Date: 2022  Patient seen for Visit count could not be calculated. Make sure you are using a visit which is associated with an episode. session         Visit Diagnoses:  No diagnosis found.      Subjective Questionnaire: QuickDASH: 27      Subjective Evaluation    History of Present Illness  Mechanism of injury: The pt reported a 1 month history of R sided shoulder pain that began with no apparent SIOBHAN. Pain extends from the top of the shoulder down to the elbow and radiates to the scapular region as well.     Pain is typically worse when she first wakes up in the mornings and is improved with heat, massage, and Tamadol and Advil. It is not worsened with any particular movements or activities of the shoulder. Symptoms fluctuate throughout the day but are generally constant. She has seen two separate PCPs and orthopedics since the onset and was ultimately referred to PT by Dr. Dunbar for acute shoulder pain in the face of cervicalgia.       Patient Occupation: Works at [employer]  Pain  Current pain ratin  At best pain ratin  At worst pain rating: 10  Location: R shoulder   Quality: radiating and dull ache  Relieving factors: medications and heat  Exacerbated by: none.  Progression: no change    Hand dominance: right    Diagnostic Tests  X-ray: normal    Treatments  Previous treatment: medication  Current treatment: medication  Patient Goals  Patient goals for therapy: decreased pain             Objective          Palpation   Left   No palpable tenderness to the infraspinatus, levator scapulae, middle trapezius and supraspinatus.   Hypertonic in the levator scapulae, middle trapezius and upper trapezius.     Right   No palpable tenderness to the  infraspinatus.   Hypertonic in the levator scapulae, middle trapezius and upper trapezius. Tenderness of the levator scapulae, middle trapezius, supraspinatus and upper trapezius.     Tenderness   Cervical Spine   Tenderness in the facet joint.     Left Shoulder   No tenderness in the biceps tendon (proximal), coracoid process, infraspinatus tendon, subscapularis tendon and supraspinatus tendon.     Right Shoulder  Tenderness in the supraspinatus tendon. No tenderness in the biceps tendon (proximal), coracoid process, infraspinatus tendon and subscapularis tendon.     Additional Tenderness Details  Local pain and referred pain to scapula with R C6 and C7 facet PA glide      Neurological Testing     Sensation     Shoulder   Left Shoulder   Intact: light touch    Right Shoulder   Intact: light touch    Reflexes   Left   Biceps (C5/C6): normal (2+)  Brachioradialis (C6): normal (2+)  Triceps (C7): normal (2+)    Right   Biceps (C5/C6): normal (2+)  Brachioradialis (C6): normal (2+)  Triceps (C7): normal (2+)    Additional Neurological Details  (+) ULTT R median and radial n. (-) ulnar   (-) ULTTs on L    Active Range of Motion   Left Shoulder   Flexion: 145 degrees   Abduction: 165 degrees   External rotation 0°: 75 degrees   Internal rotation BTB: T8     Right Shoulder   Flexion: 155 degrees   Abduction: 165 degrees   External rotation 0°: 75 degrees   Internal rotation BTB: T8     Additional Active Range of Motion Details  CROM:  Ext 67 deg with pain on the R side  Flex 48 deg  LR 60 deg  RR 70 deg with pain on the R side  LSB 45 deg  RSB 20 deg    (+) R quadrant exam, (-) L quadrant exam     Strength/Myotome Testing     Left Shoulder     Planes of Motion   Flexion: 4+   Abduction: 4+   External rotation at 0°: 4+   Internal rotation at 0°: 4+     Right Shoulder     Planes of Motion   Flexion: 4+   Abduction: 4+   External rotation at 0°: 4+   Internal rotation at 0°: 4+           Assessment & Plan      Assessment  Impairments: abnormal muscle firing, abnormal or restricted ROM, activity intolerance, impaired physical strength, lacks appropriate home exercise program and pain with function  Functional Limitations: carrying objects, sleeping, uncomfortable because of pain, reaching overhead and unable to perform repetitive tasks  Assessment details: The patient is a 56 yo female who presented to PT with evolving characteristics of acute R shoulder pain with moderate complexity. Signs and symptoms are consistent with R C6/7 nerve impingement. She demonstrated full and pain-free AROM of the R shoulder, good strength in all planes, and minimal TTP in the cuff, indicating that the shoulder is likely not the source of the pain. Symptoms were reproduced with a R quadrant exam and PA glides to R C6/7/T1. She was prescribed an HEP for UT stretching and chin tucks and hvla thrust mobs were performed to the upper thoracic spine and CT junction. She was rocking back and forth during the exam due to pain in the shoulder and this was immediately stopped after the thrust mobs. She was encouraged to email me if she felt the thrust mobs significantly reduced symptoms and we will schedule repeat treatment within 2-3 days. I expect the patient to make a timely recovery with skilled PT intervention.     Prognosis: good    Goals  Plan Goals: Short Term Goals (4 weeks):     1. The patient will be independent and compliant with initial HEP.     2. The patient will report pain at rest 0/10 or less and worst pain 4/10 or less.    3. The patient will display decreased TTP in the R C6/7 facet and dec mm tension in the surrounding musculature.    4. The patient will demonstrate appropriate cervical retractions with a 10 second hold.    5. NDI will improve by 4 points or more.       Long Term Goals (8 weeks):     1. The patient will be appropriate for independent management and compliant with progressed HEP.     2. The patient will report  pain at rest 0/10 or less and worst pain 2/10 or less.    3. The patient will return to work duties and/or ADLs with no limitations due to neck pain or dysfunction.    4. The patient will return to recreational and community activities with no limitations due to neck pain or dysfunction.    Plan  Therapy options: will be seen for skilled therapy services  Planned modality interventions: cryotherapy, electrical stimulation/Russian stimulation, iontophoresis, TENS, thermotherapy (hydrocollator packs) and traction  Planned therapy interventions: ADL retraining, body mechanics training, functional ROM exercises, home exercise program, joint mobilization, manual therapy, neuromuscular re-education, postural training, soft tissue mobilization, strengthening, stretching and therapeutic activities  Frequency: 1x week  Duration in visits: 8  Duration in weeks: 8  Treatment plan discussed with: patient  Plan details: The patient will likely benefit from NMED/TE for strengthening and stretching of cervical musculature and improvement of posture. MT will be utilized to improve CROM and to dec pain. Modalities will be used as needed for pain modulation. Dry needling as indicated.        History # of Personal Factors and/or Comorbidities: LOW (0)  Examination of Body System(s): # of elements: LOW (1-2)  Clinical Presentation: EVOLVING  Clinical Decision Making: MODERATE      Timed:         Manual Therapy:    8     mins  52389;     Therapeutic Exercise:    2     mins  82899;     Neuromuscular Nan:    0    mins  19930;    Therapeutic Activity:     0     mins  42423;     Gait Trainin     mins  71004;     Ultrasound:     0     mins  44738;    Ionto                               0    mins   65574  Self Care                       0     mins   90720  Canalith Repos    0     mins 60673      Un-Timed:  Electrical Stimulation:    0     mins  12256 ( );  Dry Needling     0     mins self-pay  Traction     0     mins  26597  Low Eval     0     Mins  23704  Mod Eval     25     Mins  95017  High Eval                       0     Mins  43772        Timed Treatment:   10   mins   Total Treatment:     35   mins          PT: Josse Forman PT     License Number: [id number]  Electronically signed by Josse Forman PT, 07/25/22, 3:12 PM EDT    Certification Period: 7/25/2022 thru 10/22/2022  I certify that the therapy services are furnished while this patient is under my care.  The services outlined above are required by this patient, and will be reviewed every 90 days.         Physician Signature:__________________________________________________    PHYSICIAN: Carl Dunbar MD  NPI: [id number]                                      DATE:      Please sign and return via fax to [email or web address] . Thank you, [facility].

## 2022-08-04 ENCOUNTER — TREATMENT (OUTPATIENT)
Dept: PHYSICAL THERAPY | Facility: CLINIC | Age: 57
End: 2022-08-04

## 2022-08-04 DIAGNOSIS — M54.2 CERVICALGIA: Primary | ICD-10-CM

## 2022-08-04 PROCEDURE — 97110 THERAPEUTIC EXERCISES: CPT | Performed by: PHYSICAL THERAPIST

## 2022-08-04 PROCEDURE — 97140 MANUAL THERAPY 1/> REGIONS: CPT | Performed by: PHYSICAL THERAPIST

## 2022-08-04 NOTE — PROGRESS NOTES
Physical Therapy Daily Treatment Note      Patient: Astrid Nobles   : 1965  Referring practitioner: Carl Dunbar,*  Date of Initial Visit: Type: THERAPY  Noted: 2022  Today's Date: 2022  Patient seen for 2 sessions       Visit Diagnoses:    ICD-10-CM ICD-9-CM   1. Cervicalgia  M54.2 723.1       Subjective Evaluation    History of Present Illness  Mechanism of injury: The pt stated that she had a few days of reduced pain following her IE but reported symptoms returned to baseline afterwards. She has been compliant with her HEP and has tolerated it well. She feels the trap stretch is beneficial but has not seen improvement with chin tucks. She had a friend massage her UT this week and felt that helped.     Pain  Current pain ratin  Location: R shoulder           Objective   See Exercise, Manual, and Modality Logs for complete treatment.       Assessment & Plan     Assessment    Assessment details: The pt responded well in the short term to thoracic and CT hvla mobs last week so they were repeated again today with a similar immediate result. STM was performed to symptomatic mm and joint mobs were performed to the local joints with no complaints of pain. Median nerve mobs and postural reeducation exercises were introduced and added to her HEP. She struggled with a seated chin tuck so they were modified to a supine position and were performed with improved consistency. She was educated on self massage and was advised to perform this frequently for pain relief.    Plan  Plan details: Continue hvla mobs. Assess response to STM and exercise and progress as indicated. Consider dry needling.           Timed:         Manual Therapy:    30     mins  37300;     Therapeutic Exercise:    15     mins  60702;     Neuromuscular Nan:    0    mins  25745;    Therapeutic Activity:     0     mins  43317;     Gait Trainin     mins  74095;     Ultrasound:     0     mins  95726;    Ionto                                0    mins   09264  Self Care                       0     mins   50391  Canalith Repos    0     mins 51737      Un-Timed:  Electrical Stimulation:    0     mins  13835 ( );  Dry Needling     0     mins self-pay  Traction     0     mins 02362      Timed Treatment:   45   mins   Total Treatment:     45   mins    Josse Forman, PT  [place] License: [id number]

## 2022-08-15 ENCOUNTER — TREATMENT (OUTPATIENT)
Dept: PHYSICAL THERAPY | Facility: CLINIC | Age: 57
End: 2022-08-15

## 2022-08-15 DIAGNOSIS — M54.2 CERVICALGIA: Primary | ICD-10-CM

## 2022-08-15 PROCEDURE — 97140 MANUAL THERAPY 1/> REGIONS: CPT | Performed by: PHYSICAL THERAPIST

## 2022-08-15 PROCEDURE — 97012 MECHANICAL TRACTION THERAPY: CPT | Performed by: PHYSICAL THERAPIST

## 2022-08-15 PROCEDURE — 97110 THERAPEUTIC EXERCISES: CPT | Performed by: PHYSICAL THERAPIST

## 2022-08-15 NOTE — PROGRESS NOTES
Physical Therapy Daily Treatment Note      Patient: Astrid Nobles   : 1965  Referring practitioner: Carl Dunbar,*  Date of Initial Visit: Type: THERAPY  Noted: 2022  Today's Date: 8/15/2022  Patient seen for 3 sessions       Visit Diagnoses:    ICD-10-CM ICD-9-CM   1. Cervicalgia  M54.2 723.1       Subjective Evaluation    History of Present Illness  Mechanism of injury: The pt stated that her neck and shoulder have remained very irritable and she is getting little relief from anything. She had to miss her last PT visit due to transportation issues. She has been performing her HEP with no significant discomfort and feels her chin tucks are getting easier. She will be seeing Dr. Dunbar next week.     Pain  Current pain ratin  Location: R sided neck and shoulder           Objective   See Exercise, Manual, and Modality Logs for complete treatment.       Assessment & Plan     Assessment    Assessment details: The pt continues to report severe levels of pain in the neck and R shoulder. Interventions have included STM, cervical distraction, hvla thrust mobs to the thoracic and CT spine, traction, dry needling, nerve glides, and postural reeducation, with no large improvement in symptoms. Needling and traction were both trialed for the first time today and were tolerated moderately well, but the pt consistently demonstrates fear avoidance behaviors with most interventions and was guarded as a result. She was provided a link for purchasing a home traction unit in the event she sees improvement in the next 24 hours. She continues to exhibit signs and symptoms of a pinched nerve in the neck and should respond well to manual interventions and consistent stretching, but unfortunately this has not been the case thus far.     Plan  Plan details: Assess response to cervical traction and dry needling and progress as indicated.          Timed:         Manual Therapy:    25     mins  52351;      Therapeutic Exercise:    8     mins  85368;     Neuromuscular Nan:    0    mins  88616;    Therapeutic Activity:     0     mins  98743;     Gait Trainin     mins  37541;     Ultrasound:     0     mins  13446;    Ionto                               0    mins   21759  Self Care                       0     mins   12319  Canalith Repos    0     mins 93239      Un-Timed:  Electrical Stimulation:    0     mins  91023 ( );  Dry Needling     0     mins self-pay  Traction     12     mins 09362  Trial of Dry Needling (no charge) 2 mins      Timed Treatment:   33   mins   Total Treatment:     47   mins    Josse Forman, PT  [place] License: [id number]

## 2022-08-19 ENCOUNTER — HOSPITAL ENCOUNTER (OUTPATIENT)
Dept: CT IMAGING | Facility: HOSPITAL | Age: 57
Discharge: HOME | End: 2022-08-19

## 2022-08-19 ENCOUNTER — LAB (OUTPATIENT)
Dept: LAB | Facility: HOSPITAL | Age: 57
End: 2022-08-19

## 2022-08-19 DIAGNOSIS — C16.2 MALIGNANT NEOPLASM OF BODY OF STOMACH: ICD-10-CM

## 2022-08-19 DIAGNOSIS — C16.2 MALIGNANT NEOPLASM OF BODY OF STOMACH: Chronic | ICD-10-CM

## 2022-08-19 PROCEDURE — 25010000002 IOPAMIDOL 61 % SOLUTION: Performed by: INTERNAL MEDICINE

## 2022-08-19 PROCEDURE — 71260 CT THORAX DX C+: CPT

## 2022-08-19 PROCEDURE — 74177 CT ABD & PELVIS W/CONTRAST: CPT

## 2022-08-19 PROCEDURE — 70491 CT SOFT TISSUE NECK W/DYE: CPT

## 2022-08-19 RX ADMIN — IOPAMIDOL 90 ML: 612 INJECTION, SOLUTION INTRAVENOUS at 10:48

## 2022-08-24 DIAGNOSIS — C16.2 MALIGNANT NEOPLASM OF BODY OF STOMACH: Primary | ICD-10-CM

## 2022-08-26 ENCOUNTER — OFFICE VISIT (OUTPATIENT)
Dept: ONCOLOGY | Facility: CLINIC | Age: 57
End: 2022-08-26

## 2022-08-26 ENCOUNTER — LAB (OUTPATIENT)
Dept: LAB | Facility: HOSPITAL | Age: 57
End: 2022-08-26

## 2022-08-26 VITALS
RESPIRATION RATE: 18 BRPM | TEMPERATURE: 98 F | SYSTOLIC BLOOD PRESSURE: 145 MMHG | DIASTOLIC BLOOD PRESSURE: 73 MMHG | BODY MASS INDEX: 34.23 KG/M2 | OXYGEN SATURATION: 98 % | HEART RATE: 76 BPM | WEIGHT: 186 LBS | HEIGHT: 62 IN

## 2022-08-26 DIAGNOSIS — Z85.028 HISTORY OF GASTRIC CANCER: Primary | ICD-10-CM

## 2022-08-26 DIAGNOSIS — C16.2 MALIGNANT NEOPLASM OF BODY OF STOMACH: ICD-10-CM

## 2022-08-26 DIAGNOSIS — D50.0 IRON DEFICIENCY ANEMIA DUE TO CHRONIC BLOOD LOSS: ICD-10-CM

## 2022-08-26 PROBLEM — D64.89 OTHER SPECIFIED ANEMIAS: Status: ACTIVE | Noted: 2022-08-26

## 2022-08-26 LAB
ALBUMIN SERPL-MCNC: 4.2 G/DL (ref 3.5–5.2)
ALBUMIN/GLOB SERPL: 1.6 G/DL
ALP SERPL-CCNC: 84 U/L (ref 39–117)
ALT SERPL W P-5'-P-CCNC: 17 U/L (ref 1–33)
ANION GAP SERPL CALCULATED.3IONS-SCNC: 11 MMOL/L (ref 5–15)
AST SERPL-CCNC: 19 U/L (ref 1–32)
BASOPHILS # BLD AUTO: 0.03 10*3/MM3 (ref 0–0.2)
BASOPHILS NFR BLD AUTO: 0.6 % (ref 0–1.5)
BILIRUB SERPL-MCNC: 0.2 MG/DL (ref 0–1.2)
BUN SERPL-MCNC: 12 MG/DL (ref 6–20)
BUN/CREAT SERPL: 13.8 (ref 7–25)
CALCIUM SPEC-SCNC: 8.9 MG/DL (ref 8.6–10.5)
CHLORIDE SERPL-SCNC: 108 MMOL/L (ref 98–107)
CO2 SERPL-SCNC: 23 MMOL/L (ref 22–29)
CREAT SERPL-MCNC: 0.87 MG/DL (ref 0.57–1)
DEPRECATED RDW RBC AUTO: 45.5 FL (ref 37–54)
EGFRCR SERPLBLD CKD-EPI 2021: 77.8 ML/MIN/1.73
EOSINOPHIL # BLD AUTO: 0.13 10*3/MM3 (ref 0–0.4)
EOSINOPHIL NFR BLD AUTO: 2.6 % (ref 0.3–6.2)
ERYTHROCYTE [DISTWIDTH] IN BLOOD BY AUTOMATED COUNT: 13.8 % (ref 12.3–15.4)
FERRITIN SERPL-MCNC: 17.47 NG/ML (ref 13–150)
GLOBULIN UR ELPH-MCNC: 2.7 GM/DL
GLUCOSE SERPL-MCNC: 111 MG/DL (ref 65–99)
HCT VFR BLD AUTO: 40.7 % (ref 34–46.6)
HGB BLD-MCNC: 12.6 G/DL (ref 12–15.9)
IMM GRANULOCYTES # BLD AUTO: 0.01 10*3/MM3 (ref 0–0.05)
IMM GRANULOCYTES NFR BLD AUTO: 0.2 % (ref 0–0.5)
LYMPHOCYTES # BLD AUTO: 1.52 10*3/MM3 (ref 0.7–3.1)
LYMPHOCYTES NFR BLD AUTO: 30.5 % (ref 19.6–45.3)
MCH RBC QN AUTO: 27.5 PG (ref 26.6–33)
MCHC RBC AUTO-ENTMCNC: 31 G/DL (ref 31.5–35.7)
MCV RBC AUTO: 88.7 FL (ref 79–97)
MONOCYTES # BLD AUTO: 0.4 10*3/MM3 (ref 0.1–0.9)
MONOCYTES NFR BLD AUTO: 8 % (ref 5–12)
NEUTROPHILS NFR BLD AUTO: 2.9 10*3/MM3 (ref 1.7–7)
NEUTROPHILS NFR BLD AUTO: 58.1 % (ref 42.7–76)
PLATELET # BLD AUTO: 186 10*3/MM3 (ref 140–450)
PMV BLD AUTO: 10.8 FL (ref 6–12)
POTASSIUM SERPL-SCNC: 4.1 MMOL/L (ref 3.5–5.2)
PROT SERPL-MCNC: 6.9 G/DL (ref 6–8.5)
RBC # BLD AUTO: 4.59 10*6/MM3 (ref 3.77–5.28)
SODIUM SERPL-SCNC: 142 MMOL/L (ref 136–145)
WBC NRBC COR # BLD: 4.99 10*3/MM3 (ref 3.4–10.8)

## 2022-08-26 PROCEDURE — 82728 ASSAY OF FERRITIN: CPT

## 2022-08-26 PROCEDURE — 36415 COLL VENOUS BLD VENIPUNCTURE: CPT

## 2022-08-26 PROCEDURE — 80053 COMPREHEN METABOLIC PANEL: CPT

## 2022-08-26 PROCEDURE — 99214 OFFICE O/P EST MOD 30 MIN: CPT | Performed by: NURSE PRACTITIONER

## 2022-08-26 PROCEDURE — 82607 VITAMIN B-12: CPT

## 2022-08-26 PROCEDURE — 85025 COMPLETE CBC W/AUTO DIFF WBC: CPT

## 2022-08-26 NOTE — PROGRESS NOTES
CHIEF COMPLAINT: 1. History of gastric carcinoma   2.  History of iron deficiency anemia    Problem List:  Oncology/Hematology History Overview Note   1.  Gastric cancer:  53-year-old female with a three-month history of indigestion and upper abdominal pain.  She was started on omeprazole for presumed reflux, symptoms were not relieved.  She underwent an EGD on 2/6/2018 with Dr. Matt that revealed a large, friable and ulcerated mass in the body of the stomach.  Biopsies returned moderately to poorly differentiated adenocarcinoma.  Staging studies including CT scan of the chest abdomen and pelvis were negative for distant disease.  She underwent diagnostic laparoscopic with peritoneal washings to complete staging on 3/13/2018.  Baseline CBC 3/9/2018 WBC 4820, hemoglobin 8.5, hematocrit 28.9%, platelet count 351,000, MCV 64.8, MCH 19.1, MCHC 29.4.  CEA normal at less than 0.50.  HER-2/erik testing from gastric body biopsy was negative.  CMP was normal other than for serum calcium slightly decreased at 8.6.  Received FLOT x8 4/5/2018 through 9/27/2018.  Genetic testing negative.  6/29/2018 subtotal gastrectomy showed 0.7 cm, 2 out of 10 node positive, moderately differentiated adenocarcinoma of the stomach pathologic T2 N1 M0 stage IIa.  Had PET scan 11/25/2019 showing right supraclavicular mediastinal and hilar adenopathy and celiac axis suggestive of terrance metastases and the patient opted out of further immunotherapy or chemotherapy.  Subsequent CAT scans showed no such enlarging nodes.  Repeat EGD 6/9/2021 showed normal anastomosis and no recurrence with colonoscopy showing internal hemorrhoids.  NCCN guideline recommendations for follow-up…  • H&P every 3-6 months through March 2020 and then every 6-12 months until March 2023 and then annually thereafter  • CBC and CMP as clinically indicated  • For subtotal gastrectomy, EGD as clinically indicated  • CT chest abdomen pelvis every 6-12 months until March 2020  and then annually out to March 2023 and/or can consider FDG PET as clinically indicated.  • Monitor for nutritional deficiencies of B12 and iron though less likely with subtotal gastrectomy and treat as indicated.    2.  Iron deficiency anemia: Patient with microcytic anemia noted at visit on 5/20/2021, referred to Dr. Matt for endoscopic evaluation.  She underwent EGD on 6/9/2021 that was negative and also colonoscopy on 7/2/2021 that was negative other than for internal hemorrhoids and diverticulosis without bleeding.  She was started on oral iron.     Malignant neoplasm of body of stomach (HCC)   2/6/2018 Initial Diagnosis    Gastric cancer     2/6/2018 Procedure    EGD revealed gastric ulcerated mass in body of stomach.  Pathology returned:   Final Diagnosis    1. GASTRIC BODY BIOPSY:  Invasive moderate to poorly differentiated adenocarcinoma with ulceration (see comment).   2. GASTRIC ANTRUM BIOPSY:  Minimal chronic gastritis without activity.  No intestinal metaplasia or dysplasia identified.  No Helicobacter pylori-like organisms identified on routine stains.   HER2/erik testing negative  FLUORESCENCE IN-SITU HYBRIDIZATION (FISH) REPORT:  Negative/Not Amplified  HER2/WILL-17 Ratio: 1.3       2/9/2018 Imaging    CT abdomen/pelvis IMPRESSION:     There is mild thickening and prominence of the mid gastric wall of the  body of the stomach, middle third.     Extragastric mass is not identified. The lesser sac and retrogastric  spaces are clear.     Visceral tumor, adenopathy, stranding or caking is not currently  identified as described. There is no retroperitoneal or retrogastric  adenopathy. The lower lung zones are clear with no evidence of reactive  pleural effusion or occult mass. There is no liver metastasis and no  other acute focal CT abnormality is identified within the abdomen or  pelvis.     Incidental note is made of a nonobstructing stone right kidney right  lower pole.     3/9/2018 Imaging    CT  "Chest IMPRESSION:  No acute intrathoracic abnormality. No definite evidence of  metastatic disease.     3/13/2018 Procedure    Diagnostic laproscopy with biopsy and lysis of adhesions.  Pathology returned:  Final Diagnosis   PERITONEAL NODULE, EXCISIONAL BIOPSY:  Benign fibrotic nodule. (See comment)  JFSAI/pam    Electronically signed by Boaz Jerez MD on 3/15/2018 at 1624   Comment See result below    The biopsy shows a lobulated fibrotic nodule with some intermixed lymphoid cells. The nodule does not appear neoplastic. One cannot entirely exclude a \"burned out\" lymph node, or other implant. There is no evidence of carcinoma.      Abdominal wall washings benign:  Final Diagnosis    1. ABDOMINAL WASH, WALL:  Negative for malignancy.  Scant cellularity; chronic inflammatory cells and scattered benign mesothelial cells.   2. ABDOMINAL WASH, WALL:  Negative for malignancy.  Scant cellularity; chronic inflammatory cells and scattered benign mesothelial cells.   3. ABDOMINAL WASH, WALL:  Negative for malignancy.  Benign mesothelial cells and mixed inflammatory cells.            4/5/2018 - 9/27/2018 Chemotherapy    OP GASTRIC FLOT OXALIplatin / Leucovorin / Fluorouracil/Taxotere  x8     5/9/2018 Genetic Testing    Genetic testing negative for cancer next panel     5/17/2018 Imaging    CT chest abdomen and pelvis showed Shrinkage of the gastric tumor with no evidence of metastasis     6/29/2018 Surgery    Surgery       Procedure:  Subtotal gastrectomy showing 0.7 cm 2 out of 10 node positive moderately differentiated adenocarcinoma of the stomach.  YpT2 N1 M0 stage IIa     10/29/2018 Imaging    CT chest abdomen pelvis with contrast shows no evidence of metastasis or recurrence but does have bilateral basilar pulmonary infiltrates.  Was started on Levaquin 10/19/18.     4/19/2019 Imaging    CT chest, abdomen and pelvis IMPRESSION:  1. Considerable atelectasis at the lung bases and/or scarring similar to  prior without " focal consolidation or effusion.  2. Stable appearance of the abdomen and pelvis without evidence for  recurrent disease; specifically, no new soft tissue nodular enhancement  with stable appearance of the surgical margin subtotal gastrectomy and  patulous appearance of the distal esophagus similar to prior. No new  peritoneal reticulation or fluid.     7/8/2019 Procedure    EGD with Dr. Matt, impression: Esophagitis.  Subtotal gastrectomy with normal-appearing mucosa.     11/8/2019 Imaging    CT chest abdomen pelvis with contrast shows enlarging paratracheal nodes     11/25/2019 Progression    PET/CT IMPRESSION:  There is extensive hypermetabolic activity seen within the  right supraclavicular region, mediastinum and hilar regions as well as  centrally within the celiac axis. Findings all suggesting diffuse  metastatic disease in the lymph nodes within the chest and upper  Abdomen.    Patient opted out of immunotherapy or further chemotherapy wanted to go with watchful waiting     5/1/2020 Imaging    -5/1/2020 CT chest abdomen pelvis showed stable postsurgical changes from subtotal gastrectomy with no soft tissue enhancing mass, fluid collection, or inflammation.  There are stable mildly enlarged supraclavicular lymph nodes but no progression of metastatic involvement compared to the imaging of 1/31/2020 11/2/2020 Imaging    CT chest, abdomen and pelvis IMPRESSION:  Stable scarring at the lung bases bilaterally. Patient again  status post partial gastrectomy. No evidence of local recurrence or  metastatic disease. No progression of disease. Findings are all stable.        5/7/2021 Imaging    -5/7/2021 CT chest abdomen pelvis with contrast shows no evidence of recurrence and hemoglobin 10.4 with MCV 78.5 down from 12.5 in November with MCV 87 at that junction.  CMP unremarkable.     5/20/2021 -  Other Event    -5/20/2021 ferritin low end of normal 14.75 with iron low at 31 and saturation low 6% with elevated  total iron binding capacity 560 and elevated transferrin 376 commensurate with iron deficiency anemia.       6/9/2021 -  Other Event      -6/9/2021 reports the patient from Dr. Matt showed no Helicobacter pylori, intestinal metaplasia, or dysplasia.  Anastomosis normal.  No gross evidence of recurrent gastric cancer.  Recommends next step of colonoscopy.     7/2/2021 Procedure    Colonoscopy: Internal hemorrhoids and diverticulosis without bleeding, otherwise normal.     8/9/2021 Imaging    CT chest, abdomen and pelvis IMPRESSION:  Stable appearance of the chest, abdomen and pelvis. Some  chronic change is stable within the lung bases bilaterally. There is no  evidence of local recurrence or metastatic disease.     2/17/2022 Imaging    -2/17/2022 CT chest abdomen pelvis with contrast shows chronic interstitial changes lower lungs compared to May 2021 with no evidence of recurrent malignancy or metastasis in the abdomen or pelvis.  CBC, CMP, ferritin, iron profile entirely normal.     8/19/2022 Imaging    CT neck, chest, abdomen and pelvis IMPRESSION:  No evidence of pathologic cervical lymphadenopathy or other worrisome or  acute findings in the neck.     Overall stable CT appearance of the chest, abdomen and pelvis, without  evidence of new soft tissue finding including pathologic adenopathy  concerning for disease progression/recurrence. Areas of hypermetabolic  lymphadenopathy seen on 2019 PET/CT remain resolved.         HISTORY OF PRESENT ILLNESS:  The patient is a 57 y.o. female, here for follow up on history of gastric carcinoma.  Astrid overall has been doing well since we saw her last.  She is doing physical therapy for right shoulder pain that she thinks is due to a lot of repetitive motion at her job otherwise has no new concerns.  She reports that she feels well.  She has no chest or abdominal pain, no difficulty swallowing, no unusual reflux.  Her weight is stable.  No change in her bowel or bladder  "habits.    Past Medical History:   Diagnosis Date   • Abdominal pain    • Acid reflux    • Arthritis    • Gastric cancer (HCC)    • Gastric mass    • Gastric ulcer    • History of transfusion     no reaction 2018   • Wears glasses      Past Surgical History:   Procedure Laterality Date   • CERVICAL BIOPSY  W/ LOOP ELECTRODE EXCISION     • DIAGNOSTIC LAPAROSCOPY N/A 3/13/2018    Procedure: DIAGNOSTIC LAPAROSCOPY WITH BIOPSY, LYSIS OF ADHESIONS;  Surgeon: Nicole Crooks MD;  Location:  [facility];  Service: General   • ENDOSCOPY     • GASTRECTOMY N/A 6/29/2018    Procedure: SUBTOTAL GASTRECTOMY;  Surgeon: Nicole Crooks MD;  Location:  [facility];  Service: General   • STOMACH SURGERY  06/09/2018    For removal of mass   • TUBAL ABDOMINAL LIGATION     • VENOUS ACCESS DEVICE (PORT) INSERTION     • VENOUS ACCESS DEVICE (PORT) REMOVAL N/A 12/21/2018    Procedure: PORT REMOVAL;  Surgeon: Nicole Crooks MD;  Location:  [facility];  Service: General       No Known Allergies    Family History and Social History reviewed and changed as necessary    REVIEW OF SYSTEM:   Right shoulder pain otherwise no new somatic complaints    PHYSICAL EXAM:  Well-developed, well-nourished healthy-appearing female in no distress.  BMI 34  No cervical, supraclavicular or axillary nodes palpable on exam  Lungs clear to auscultation bilaterally, respirations regular and unlabored      Vitals:    08/26/22 1404   BP: 145/73   Pulse: 76   Resp: 18   Temp: 98 °F (36.7 °C)   SpO2: 98%   Weight: 84.4 kg (186 lb)   Height: 157.5 cm (62\")     Vitals:    08/26/22 1404   PainSc: 0-No pain          ECOG score: 0           Vitals reviewed.  Labs reviewed      Lab Results   Component Value Date    HGB 12.6 08/26/2022    HCT 40.7 08/26/2022    MCV 88.7 08/26/2022     08/26/2022    WBC 4.99 08/26/2022    NEUTROABS 2.90 08/26/2022    LYMPHSABS 1.52 08/26/2022    MONOSABS 0.40 08/26/2022    EOSABS 0.13 08/26/2022    BASOSABS 0.03 08/26/2022       Lab Results "   Component Value Date    GLUCOSE 107 (H) 06/21/2022    BUN 12 06/21/2022    CREATININE 0.84 06/21/2022     06/21/2022    K 4.4 06/21/2022     06/21/2022    CO2 23.6 06/21/2022    CALCIUM 9.2 06/21/2022    PROTEINTOT 7.3 02/17/2022    ALBUMIN 4.20 06/21/2022    BILITOT 0.3 06/21/2022    ALKPHOS 85 06/21/2022    AST 18 06/21/2022    ALT 12 06/21/2022     CT Soft Tissue Neck With Contrast    Result Date: 8/19/2022  No evidence of pathologic cervical lymphadenopathy or other worrisome or acute findings in the neck.  Overall stable CT appearance of the chest, abdomen and pelvis, without evidence of new soft tissue finding including pathologic adenopathy concerning for disease progression/recurrence. Areas of hypermetabolic lymphadenopathy seen on 2019 PET/CT remain resolved.  This report was finalized on 8/19/2022 3:19 PM by Kg Hahn.      CT Chest With Contrast Diagnostic    Result Date: 8/19/2022  No evidence of pathologic cervical lymphadenopathy or other worrisome or acute findings in the neck.  Overall stable CT appearance of the chest, abdomen and pelvis, without evidence of new soft tissue finding including pathologic adenopathy concerning for disease progression/recurrence. Areas of hypermetabolic lymphadenopathy seen on 2019 PET/CT remain resolved.  This report was finalized on 8/19/2022 3:19 PM by Kg Hahn.      CT Abdomen Pelvis With Contrast    Result Date: 8/19/2022  No evidence of pathologic cervical lymphadenopathy or other worrisome or acute findings in the neck.  Overall stable CT appearance of the chest, abdomen and pelvis, without evidence of new soft tissue finding including pathologic adenopathy concerning for disease progression/recurrence. Areas of hypermetabolic lymphadenopathy seen on 2019 PET/CT remain resolved.  This report was finalized on 8/19/2022 3:19 PM by Kg Hahn.            ASSESSMENT & PLAN:  1.  Gastric cancer:  53-year-old female with a  three-month history of indigestion and upper abdominal pain.  She was started on omeprazole for presumed reflux, symptoms were not relieved.  She underwent an EGD on 2/6/2018 with Dr. Matt that revealed a large, friable and ulcerated mass in the body of the stomach.  Biopsies returned moderately to poorly differentiated adenocarcinoma.  Staging studies including CT scan of the chest abdomen and pelvis were negative for distant disease.  She underwent diagnostic laparoscopic with peritoneal washings to complete staging on 3/13/2018.  Baseline CBC 3/9/2018 WBC 4820, hemoglobin 8.5, hematocrit 28.9%, platelet count 351,000, MCV 64.8, MCH 19.1, MCHC 29.4.  CEA normal at less than 0.50.  HER-2/erik testing from gastric body biopsy was negative.  CMP was normal other than for serum calcium slightly decreased at 8.6.  Received FLOT x8 4/5/2018 through 9/27/2018.  Genetic testing negative.  6/29/2018 subtotal gastrectomy showed 0.7 cm, 2 out of 10 node positive, moderately differentiated adenocarcinoma of the stomach pathologic T2 N1 M0 stage IIa.  Had PET scan 11/25/2019 showing right supraclavicular mediastinal and hilar adenopathy and celiac axis suggestive of terrance metastases and the patient opted out of further immunotherapy or chemotherapy.  Subsequent CAT scans showed no such enlarging nodes.  Repeat EGD 6/9/2021 showed normal anastomosis and no recurrence with colonoscopy showing internal hemorrhoids.  NCCN guideline recommendations for follow-up…  • H&P every 3-6 months through March 2020 and then every 6-12 months until March 2023 and then annually thereafter  • CBC and CMP as clinically indicated  • For subtotal gastrectomy, EGD as clinically indicated  • CT chest abdomen pelvis every 6-12 months until March 2020 and then annually out to March 2023 and/or can consider FDG PET as clinically indicated.  • Monitor for nutritional deficiencies of B12 and iron though less likely with subtotal gastrectomy and treat  as indicated.    2.  Iron deficiency anemia: Patient with microcytic anemia noted at visit on 5/20/2021, referred to Dr. Matt for endoscopic evaluation.  She underwent EGD on 6/9/2021 that was negative and also colonoscopy on 7/2/2021 that was negative other than for internal hemorrhoids and diverticulosis without bleeding.  She was started on oral iron.    -8/26/2022 [facility] Oncology/Hematology clinic follow-up: Astrid is doing well, no evidence of recurrent gastric cancer on clinical exam and no new worrisome symptoms. Current CT scans from 8/19/2022 were negative with no findings worrisome for disease recurrence or progression.  CBC from today is normal with Hemoglobin 12.6, hematocrit 40.7%, WBC 4.99, platelet count 186,000, ferritin, CMP, B12 are pending.  Those will be released via [employer] which she does have access to, I will notify her of any abnormal findings.  We will repeat CBC and iron studies again in 6 months and if normal at that time we will discontinue routine serum testing.  We will continue surveillance for her history of gastric cancer with repeat CT chest, abdomen and pelvis in 1 year at which time she will be 5 years out from surgery.    Return to clinic in 6 months for follow-up    I spent 30 minutes caring for Astrid on this date of service. This time includes time spent by me in the following activities: preparing for the visit, reviewing tests, obtaining and/or reviewing a separately obtained history, performing a medically appropriate examination and/or evaluation, ordering medications, tests, or procedures and documenting information in the medical record.     Kathy Salazar, APRN    08/26/2022

## 2022-08-27 LAB — VIT B12 BLD-MCNC: 556 PG/ML (ref 211–946)

## 2022-09-01 ENCOUNTER — OFFICE VISIT (OUTPATIENT)
Dept: ORTHOPEDIC SURGERY | Facility: CLINIC | Age: 57
End: 2022-09-01

## 2022-09-01 VITALS
SYSTOLIC BLOOD PRESSURE: 120 MMHG | BODY MASS INDEX: 34.23 KG/M2 | HEIGHT: 62 IN | DIASTOLIC BLOOD PRESSURE: 64 MMHG | WEIGHT: 186 LBS

## 2022-09-01 DIAGNOSIS — M25.511 ACUTE PAIN OF RIGHT SHOULDER: Primary | ICD-10-CM

## 2022-09-01 DIAGNOSIS — M54.2 CERVICALGIA: ICD-10-CM

## 2022-09-01 PROCEDURE — 99213 OFFICE O/P EST LOW 20 MIN: CPT | Performed by: ORTHOPAEDIC SURGERY

## 2022-09-01 RX ORDER — ALPRAZOLAM 0.25 MG/1
0.25 TABLET ORAL
Qty: 1 TABLET | Refills: 0 | Status: SHIPPED | OUTPATIENT
Start: 2022-09-01 | End: 2022-09-01

## 2022-09-01 NOTE — PROGRESS NOTES
"    [facility] [place] [facility] Note        Subjective     CC: Follow-up (7 week follow up -- Acute pain of right shoulder)      MIKE Nobles is a 57 y.o. female.  Patient returns the office today for follow-up of her right neck and shoulder pain.  Continues to struggle with medial border scapular pain.  Denies numbness or tingling.  Has pain with certain cervical movements.  Has been seeing Josse who has done dry needling and cervical traction.    Overall, patient's symptoms are essentially the same.    ROS:    Constiutional:Pt denies fever, chills, nausea, or vomiting.  MSK:as above        Objective      Past Medical History  Past Medical History:   Diagnosis Date   • Abdominal pain    • Acid reflux    • Arthritis    • Gastric cancer (HCC)    • Gastric mass    • Gastric ulcer    • History of transfusion     no reaction 2018   • Wears glasses          Physical Exam  /64   Ht 157.5 cm (62.01\")   Wt 84.4 kg (186 lb)   BMI 34.01 kg/m²     Body mass index is 34.01 kg/m².    Patient is well nourished and well developed.        Ortho Exam  Musculoskeletal   Upper Extremity   Right Shoulder       Strength and Tone:    Supraspinatus -5 out of 5    External Rotators-5/5    Infraspinatus - 5/5    Subscapularis - 5/5    Deltoid - 5/5     Range of Motion      Right Shoulder:    Internal Rotation: ROM - L4    External Rotation: AROM - 70 degrees    Elevation through flexion: AROM - 140 degrees     AC joint:  non tender to palpation    AC joint:  negative crossover    Medial border the scapula are tender.  Some mild reproduction with cervical range of motion.    Imaging/Labs/EMG Reviewed:  Imaging Results (Last 24 Hours)     ** No results found for the last 24 hours. **            Assessment    Assessment:  1. Acute pain of right shoulder    2. Cervicalgia        Plan:  1. Recommend over the counter anti-inflammatories for pain and/or swelling  2. Right shoulder pain with cervicalgia--MRI the patient " cervical spine will be ordered today.  We will get her a Xanax 0.25 mg tablet to take 30 minutes prior to the MRI for claustrophobia.  We have instructed her to bring a  with her.  See her back to review the MRI.      Carl Dunbar MD  09/01/22  16:02 EDT      Dictated Utilizing Dragon Dictation.

## 2022-09-07 ENCOUNTER — TREATMENT (OUTPATIENT)
Dept: PHYSICAL THERAPY | Facility: CLINIC | Age: 57
End: 2022-09-07

## 2022-09-07 DIAGNOSIS — M54.2 CERVICALGIA: Primary | ICD-10-CM

## 2022-09-07 PROCEDURE — 97110 THERAPEUTIC EXERCISES: CPT | Performed by: PHYSICAL THERAPIST

## 2022-09-07 PROCEDURE — 97012 MECHANICAL TRACTION THERAPY: CPT | Performed by: PHYSICAL THERAPIST

## 2022-09-07 PROCEDURE — 97140 MANUAL THERAPY 1/> REGIONS: CPT | Performed by: PHYSICAL THERAPIST

## 2022-09-07 NOTE — PROGRESS NOTES
Physical Therapy Re Certification Of Plan of Care  Patient: Astrid Nobles   : 1965  Diagnosis/ICD-10 Code:  Cervicalgia [M54.2]  Referring practitioner: Carl Dunbar,*  Date of Initial Visit: Type: THERAPY  Noted: 2022  Today's Date: 2022  Patient seen for 4 sessions         Visit Diagnoses:    ICD-10-CM ICD-9-CM   1. Cervicalgia  M54.2 723.1       Subjective Questionnaire: QuickDASH: 5  Clinical Progress: improved  Home Program Compliance: Yes  Treatment has included: therapeutic exercise, neuromuscular re-education, manual therapy, therapeutic activity, traction and dry needling      Subjective Evaluation    History of Present Illness  Mechanism of injury:   The patient reported a 95% improvement in symptoms since her initial evaluation and feels she is doing much better. She stated that pain has not increased above a 4/10 in the last week. She described most of her current symptoms as a feeling of tightness in the right scapula but stated she has been unable to reach to massage this area. She has not yet attempted tennis ball massage. She has been compliant with her HEP and feels it is helping and still appropriate. She reported a reduction in symptoms following dry needling last visit but is not interested in having it performed again today. She saw Dr. Dunbar last week and is scheduled for an MRI.    Pain  Current pain ratin             Objective          Palpation   Left   No palpable tenderness to the infraspinatus, levator scapulae, middle trapezius and supraspinatus.   Hypertonic in the levator scapulae, middle trapezius and upper trapezius.     Right   No palpable tenderness to the infraspinatus.   Hypertonic in the levator scapulae, middle trapezius and upper trapezius. Tenderness of the levator scapulae, middle trapezius, supraspinatus and upper trapezius.     Tenderness   Cervical Spine   Tenderness in the facet joint.     Left Shoulder   No tenderness in the biceps  tendon (proximal), coracoid process, infraspinatus tendon, subscapularis tendon and supraspinatus tendon.     Right Shoulder  Tenderness in the supraspinatus tendon. No tenderness in the biceps tendon (proximal), coracoid process, infraspinatus tendon and subscapularis tendon.     Additional Tenderness Details  Local pain and referred pain to scapula with R C6 and C7 facet PA glide      Neurological Testing     Sensation     Shoulder   Left Shoulder   Intact: light touch    Right Shoulder   Intact: light touch    Reflexes   Left   Biceps (C5/C6): normal (2+)  Brachioradialis (C6): normal (2+)  Triceps (C7): normal (2+)    Right   Biceps (C5/C6): normal (2+)  Brachioradialis (C6): normal (2+)  Triceps (C7): normal (2+)    Additional Neurological Details  (+) ULTT R median and radial n. (-) ulnar   (-) ULTTs on L    Active Range of Motion   Left Shoulder   Flexion: 145 degrees   Abduction: 165 degrees   External rotation 0°: 75 degrees   Internal rotation BTB: T8     Right Shoulder   Flexion: 155 degrees   Abduction: 165 degrees   External rotation 0°: 75 degrees   Internal rotation BTB: T8     Additional Active Range of Motion Details  CROM:  Ext 68 deg  Flex 45 deg  LR 70 deg  RR 80 deg   LSB 35 deg  RSB 33 deg    (+) R quadrant exam, (-) L quadrant exam     Strength/Myotome Testing     Left Shoulder     Planes of Motion   Flexion: 4+   Abduction: 4+   External rotation at 0°: 4+   Internal rotation at 0°: 4+     Right Shoulder     Planes of Motion   Flexion: 4+   Abduction: 4+   External rotation at 0°: 4+   Internal rotation at 0°: 4+           Assessment & Plan     Assessment  Impairments: abnormal muscle firing, abnormal or restricted ROM, activity intolerance, impaired physical strength, lacks appropriate home exercise program and pain with function  Functional Limitations: carrying objects, sleeping, uncomfortable because of pain, reaching overhead and unable to perform repetitive tasks  Assessment details:  The patient has seen a significant improvement in pain since her initial evaluation and responded well to interventions performed at her last visit. Dry needling was offered again today but was declined due to fear of needles. She felt that joint manipulation and cervical traction has been beneficial so this was repeated in the clinic today. Most of her symptoms are now feelings of tightness in the right scapular region so soft tissue massage was performed to local musculature and she was again educated on self massage with a tennis ball. She reported reduced symptoms with performance in the clinic today and should see similar results with independent management. Scapular symptoms still seem to be coming from the cervical spine, though active range of motion was significantly improved and pain-free in all directions today. PIVM to the right lower facets did reproduce scapular symptoms, though mild. her HEP was unchanged and she was encouraged to remain active within her pain tolerance. Overall she is doing better and should continue to respond well to ongoing treatment.  Prognosis: good    Goals  Plan Goals: Short Term Goals (4 weeks):     1. The patient will be independent and compliant with initial HEP. Met     2. The patient will report pain at rest 0/10 or less and worst pain 4/10 or less. Ongoing    3. The patient will display decreased TTP in the R C6/7 facet and dec mm tension in the surrounding musculature. Met     4. The patient will demonstrate appropriate cervical retractions with a 10 second hold. Met    5. NDI will improve by 4 points or more. Met      Long Term Goals (8 weeks):     1. The patient will be appropriate for independent management and compliant with progressed HEP. Ongoing     2. The patient will report pain at rest 0/10 or less and worst pain 2/10 or less. Ongoing    3. The patient will return to work duties and/or ADLs with no limitations due to neck pain or dysfunction. Ongoing     4.  The patient will return to recreational and community activities with no limitations due to neck pain or dysfunction. Ongoing    Plan  Therapy options: will be seen for skilled therapy services  Planned modality interventions: cryotherapy, electrical stimulation/Russian stimulation, iontophoresis, TENS, thermotherapy (hydrocollator packs) and traction  Planned therapy interventions: ADL retraining, body mechanics training, functional ROM exercises, home exercise program, joint mobilization, manual therapy, neuromuscular re-education, postural training, soft tissue mobilization, strengthening, stretching and therapeutic activities  Frequency: 1x week  Duration in visits: 8  Duration in weeks: 8  Treatment plan discussed with: patient  Plan details: Repeat traction and joint manipulation as indicated. Continue soft tissue massage. Begin postural reeducation strengthening exercises.           Recommendations: Continue as planned  Timeframe: 1 month  Prognosis to achieve goals: good      Timed:         Manual Therapy:    10     mins  48267;     Therapeutic Exercise:    20     mins  86744;     Neuromuscular Nan:    0    mins  13417;    Therapeutic Activity:     0     mins  96487;     Gait Trainin     mins  55203;     Ultrasound:     0     mins  14987;    Ionto                               0    mins   10353  Self Care                       0     mins   86099  Canalith Repos    0     mins 18078      Un-Timed:  Electrical Stimulation:    0     mins  81537 ( );  Dry Needling     0     mins self-pay  Traction     15     mins 01645  Re-Eval                           0    mins  91343      Timed Treatment:   30   mins   Total Treatment:     45   mins          PT: Josse Forman PT     [place] License:  [id number]    Electronically signed by Josse Forman PT, 22, 3:37 PM EDT    Certification Period: 2022 thru 2022  I certify that the therapy services are furnished while this patient is under my care.  The  services outlined above are required by this patient, and will be reviewed every 90 days.         Physician Signature:__________________________________________________    PHYSICIAN: Carl Dunbar MD  NPI: [id number]                                      DATE:  :     Please sign and return via fax to [email or web address] . Thank you, [facility]

## 2022-10-12 ENCOUNTER — HOSPITAL ENCOUNTER (OUTPATIENT)
Dept: MRI IMAGING | Facility: HOSPITAL | Age: 57
Discharge: HOME | End: 2022-10-12
Admitting: ORTHOPAEDIC SURGERY

## 2022-10-12 DIAGNOSIS — M25.511 ACUTE PAIN OF RIGHT SHOULDER: ICD-10-CM

## 2022-10-12 DIAGNOSIS — M54.2 CERVICALGIA: Primary | ICD-10-CM

## 2022-10-12 DIAGNOSIS — M43.02 CERVICAL SPONDYLOLYSIS: ICD-10-CM

## 2022-10-12 DIAGNOSIS — M54.2 CERVICALGIA: ICD-10-CM

## 2022-10-12 PROCEDURE — 72141 MRI NECK SPINE W/O DYE: CPT

## 2022-10-13 ENCOUNTER — TELEPHONE (OUTPATIENT)
Dept: ORTHOPEDIC SURGERY | Facility: CLINIC | Age: 57
End: 2022-10-13

## 2022-10-13 NOTE — TELEPHONE ENCOUNTER
----- Message from Carl Dunbar MD sent at 10/12/2022  1:47 PM EDT -----  Please call patient with results and let her know that a referral has been placed to neurosurgery for evaluation.    Thank you    JRT  ----- Message -----  From: Interface, Rad Results [place] In  Sent: 10/12/2022   1:37 PM EDT  To: Carl Dunbar MD

## 2022-10-13 NOTE — TELEPHONE ENCOUNTER
I spoke with the patient and explained that Dr Dunbar is going to put in a referral and scheduling will give her a call.  I read her the impression of the MRI.  She asked if it was anything serious and I explained that Dr. García did not state anything of that sort, it for them to evaluate and see if anything further needs to be taken care of for her neck issues.  She had no further questions.    Hayley Reynolds

## 2023-03-02 ENCOUNTER — TELEPHONE (OUTPATIENT)
Dept: ONCOLOGY | Facility: CLINIC | Age: 58
End: 2023-03-02
Payer: COMMERCIAL

## 2023-03-02 NOTE — TELEPHONE ENCOUNTER
Caller: Astrid Nobles    Relationship: Self    Best call back number: [phone]    What is the best time to reach you: ANYTIME    Who are you requesting to speak with (clinical staff, provider,  specific staff member): SCHEDULING     What was the call regarding: PT WOULD LIKE A C/B TO DISCUSS FOLLOW UP 1 ON 3/3 ..... WHAT IT WILL DETAIL, SAYS SHE USUALLY HAS A CT SCAN DONE THAN A F/U    Do you require a callback: YES TO ADVISE OF APPT

## 2023-03-02 NOTE — TELEPHONE ENCOUNTER
Called and left patient a detailed message letting her know that appt tomorrow is to draw labs and assess any symptoms, scans will be ordered prior to follow up 6 months from now. Left call back number if needed for any additional questions.

## 2023-03-06 ENCOUNTER — OFFICE VISIT (OUTPATIENT)
Dept: ONCOLOGY | Facility: CLINIC | Age: 58
End: 2023-03-06
Payer: COMMERCIAL

## 2023-03-06 ENCOUNTER — LAB (OUTPATIENT)
Dept: LAB | Facility: HOSPITAL | Age: 58
End: 2023-03-06
Payer: COMMERCIAL

## 2023-03-06 VITALS
HEART RATE: 69 BPM | OXYGEN SATURATION: 96 % | RESPIRATION RATE: 18 BRPM | HEIGHT: 62 IN | WEIGHT: 185 LBS | DIASTOLIC BLOOD PRESSURE: 86 MMHG | SYSTOLIC BLOOD PRESSURE: 145 MMHG | BODY MASS INDEX: 34.04 KG/M2 | TEMPERATURE: 98 F

## 2023-03-06 DIAGNOSIS — D50.0 IRON DEFICIENCY ANEMIA DUE TO CHRONIC BLOOD LOSS: ICD-10-CM

## 2023-03-06 DIAGNOSIS — Z85.028 HISTORY OF GASTRIC CANCER: ICD-10-CM

## 2023-03-06 DIAGNOSIS — Z85.028 HISTORY OF GASTRIC CANCER: Primary | ICD-10-CM

## 2023-03-06 LAB
BASOPHILS # BLD AUTO: 0.04 10*3/MM3 (ref 0–0.2)
BASOPHILS NFR BLD AUTO: 0.8 % (ref 0–1.5)
DEPRECATED RDW RBC AUTO: 45.3 FL (ref 37–54)
EOSINOPHIL # BLD AUTO: 0.13 10*3/MM3 (ref 0–0.4)
EOSINOPHIL NFR BLD AUTO: 2.5 % (ref 0.3–6.2)
ERYTHROCYTE [DISTWIDTH] IN BLOOD BY AUTOMATED COUNT: 14.4 % (ref 12.3–15.4)
FERRITIN SERPL-MCNC: 16.02 NG/ML (ref 13–150)
HCT VFR BLD AUTO: 41.8 % (ref 34–46.6)
HGB BLD-MCNC: 12.9 G/DL (ref 12–15.9)
IMM GRANULOCYTES # BLD AUTO: 0.01 10*3/MM3 (ref 0–0.05)
IMM GRANULOCYTES NFR BLD AUTO: 0.2 % (ref 0–0.5)
IRON 24H UR-MRATE: 58 MCG/DL (ref 37–145)
IRON SATN MFR SERPL: 11 % (ref 20–50)
LYMPHOCYTES # BLD AUTO: 1.92 10*3/MM3 (ref 0.7–3.1)
LYMPHOCYTES NFR BLD AUTO: 37.1 % (ref 19.6–45.3)
MCH RBC QN AUTO: 26.3 PG (ref 26.6–33)
MCHC RBC AUTO-ENTMCNC: 30.9 G/DL (ref 31.5–35.7)
MCV RBC AUTO: 85.1 FL (ref 79–97)
MONOCYTES # BLD AUTO: 0.48 10*3/MM3 (ref 0.1–0.9)
MONOCYTES NFR BLD AUTO: 9.3 % (ref 5–12)
NEUTROPHILS NFR BLD AUTO: 2.59 10*3/MM3 (ref 1.7–7)
NEUTROPHILS NFR BLD AUTO: 50.1 % (ref 42.7–76)
PLATELET # BLD AUTO: 232 10*3/MM3 (ref 140–450)
PMV BLD AUTO: 10.3 FL (ref 6–12)
RBC # BLD AUTO: 4.91 10*6/MM3 (ref 3.77–5.28)
TIBC SERPL-MCNC: 516 MCG/DL (ref 298–536)
TRANSFERRIN SERPL-MCNC: 346 MG/DL (ref 200–360)
WBC NRBC COR # BLD: 5.17 10*3/MM3 (ref 3.4–10.8)

## 2023-03-06 PROCEDURE — 82728 ASSAY OF FERRITIN: CPT

## 2023-03-06 PROCEDURE — 36415 COLL VENOUS BLD VENIPUNCTURE: CPT

## 2023-03-06 PROCEDURE — 83540 ASSAY OF IRON: CPT

## 2023-03-06 PROCEDURE — 85025 COMPLETE CBC W/AUTO DIFF WBC: CPT

## 2023-03-06 PROCEDURE — 99213 OFFICE O/P EST LOW 20 MIN: CPT | Performed by: NURSE PRACTITIONER

## 2023-03-06 PROCEDURE — 84466 ASSAY OF TRANSFERRIN: CPT

## 2023-03-07 NOTE — PROGRESS NOTES
CHIEF COMPLAINT: 1. History of gastric carcinoma   2.  History of iron deficiency anemia    Problem List:  Oncology/Hematology History Overview Note   1.  Gastric cancer:  53-year-old female with a three-month history of indigestion and upper abdominal pain.  She was started on omeprazole for presumed reflux, symptoms were not relieved.  She underwent an EGD on 2/6/2018 with Dr. Matt that revealed a large, friable and ulcerated mass in the body of the stomach.  Biopsies returned moderately to poorly differentiated adenocarcinoma.  Staging studies including CT scan of the chest abdomen and pelvis were negative for distant disease.  She underwent diagnostic laparoscopic with peritoneal washings to complete staging on 3/13/2018.  Baseline CBC 3/9/2018 WBC 4820, hemoglobin 8.5, hematocrit 28.9%, platelet count 351,000, MCV 64.8, MCH 19.1, MCHC 29.4.  CEA normal at less than 0.50.  HER-2/erik testing from gastric body biopsy was negative.  CMP was normal other than for serum calcium slightly decreased at 8.6.  Received FLOT x8 4/5/2018 through 9/27/2018.  Genetic testing negative.  6/29/2018 subtotal gastrectomy showed 0.7 cm, 2 out of 10 node positive, moderately differentiated adenocarcinoma of the stomach pathologic T2 N1 M0 stage IIa.  Had PET scan 11/25/2019 showing right supraclavicular mediastinal and hilar adenopathy and celiac axis suggestive of terrance metastases and the patient opted out of further immunotherapy or chemotherapy.  Subsequent CAT scans showed no such enlarging nodes.  Repeat EGD 6/9/2021 showed normal anastomosis and no recurrence with colonoscopy showing internal hemorrhoids.  NCCN guideline recommendations for follow-up…  • H&P every 3-6 months through March 2020 and then every 6-12 months until March 2023 and then annually thereafter  • CBC and CMP as clinically indicated  • For subtotal gastrectomy, EGD as clinically indicated  • CT chest abdomen pelvis every 6-12 months until March 2020  and then annually out to March 2023 and/or can consider FDG PET as clinically indicated.  • Monitor for nutritional deficiencies of B12 and iron though less likely with subtotal gastrectomy and treat as indicated.    2.  Iron deficiency anemia: Patient with microcytic anemia noted at visit on 5/20/2021, referred to Dr. Matt for endoscopic evaluation.  She underwent EGD on 6/9/2021 that was negative and also colonoscopy on 7/2/2021 that was negative other than for internal hemorrhoids and diverticulosis without bleeding.  She was started on oral iron.    -2/18/2022 [facility] Oncology/Hematology clinic follow-up: I reviewed current images and reports of CTs results as outlined above.  No evidence of recurrence.  Blood counts chemistries iron indices etc. all normal.  Oral repeat CBC again every 6 months until a year from now and from that point forward will do it just as clinically indicated.  No hint of iron deficiency or B12 deficiency which I would find to be unlikely with subtotal gastrectomy.  Follow-up per NCCN guidelines as outlined above.    -8/26/2022 [facility] Oncology/Hematology clinic follow-up: Astrid is doing well, no evidence of recurrent gastric cancer on clinical exam and no new worrisome symptoms. Current CT scans from 8/19/2022 were negative with no findings worrisome for disease recurrence or progression.  CBC from today is normal with Hemoglobin 12.6, hematocrit 40.7%, WBC 4.99, platelet count 186,000, ferritin, CMP, B12 are pending.  Those will be released via [employer] which she does have access to, I will notify her of any abnormal findings.  We will repeat CBC and iron studies again in 6 months and if normal at that time we will discontinue routine serum testing.  We will continue surveillance for her history of gastric cancer with repeat CT chest, abdomen and pelvis in 1 year at which time she will be 5 years out from surgery.     Malignant neoplasm of body of stomach (HCC)   2/6/2018 Initial  "Diagnosis    Gastric cancer     2/6/2018 Procedure    EGD revealed gastric ulcerated mass in body of stomach.  Pathology returned:   Final Diagnosis    1. GASTRIC BODY BIOPSY:  Invasive moderate to poorly differentiated adenocarcinoma with ulceration (see comment).   2. GASTRIC ANTRUM BIOPSY:  Minimal chronic gastritis without activity.  No intestinal metaplasia or dysplasia identified.  No Helicobacter pylori-like organisms identified on routine stains.   HER2/erik testing negative  FLUORESCENCE IN-SITU HYBRIDIZATION (FISH) REPORT:  Negative/Not Amplified  HER2/WILL-17 Ratio: 1.3       2/9/2018 Imaging    CT abdomen/pelvis IMPRESSION:     There is mild thickening and prominence of the mid gastric wall of the  body of the stomach, middle third.     Extragastric mass is not identified. The lesser sac and retrogastric  spaces are clear.     Visceral tumor, adenopathy, stranding or caking is not currently  identified as described. There is no retroperitoneal or retrogastric  adenopathy. The lower lung zones are clear with no evidence of reactive  pleural effusion or occult mass. There is no liver metastasis and no  other acute focal CT abnormality is identified within the abdomen or  pelvis.     Incidental note is made of a nonobstructing stone right kidney right  lower pole.     3/9/2018 Imaging    CT Chest IMPRESSION:  No acute intrathoracic abnormality. No definite evidence of  metastatic disease.     3/13/2018 Procedure    Diagnostic laproscopy with biopsy and lysis of adhesions.  Pathology returned:  Final Diagnosis   PERITONEAL NODULE, EXCISIONAL BIOPSY:  Benign fibrotic nodule. (See comment)  DORCAS/pam    Electronically signed by Boaz Jerez MD on 3/15/2018 at 1624   Comment See result below    The biopsy shows a lobulated fibrotic nodule with some intermixed lymphoid cells. The nodule does not appear neoplastic. One cannot entirely exclude a \"burned out\" lymph node, or other implant. There is no evidence of " carcinoma.      Abdominal wall washings benign:  Final Diagnosis    1. ABDOMINAL WASH, WALL:  Negative for malignancy.  Scant cellularity; chronic inflammatory cells and scattered benign mesothelial cells.   2. ABDOMINAL WASH, WALL:  Negative for malignancy.  Scant cellularity; chronic inflammatory cells and scattered benign mesothelial cells.   3. ABDOMINAL WASH, WALL:  Negative for malignancy.  Benign mesothelial cells and mixed inflammatory cells.            4/5/2018 - 9/27/2018 Chemotherapy    OP GASTRIC FLOT OXALIplatin / Leucovorin / Fluorouracil/Taxotere  x8     5/9/2018 Genetic Testing    Genetic testing negative for cancer next panel     5/17/2018 Imaging    CT chest abdomen and pelvis showed Shrinkage of the gastric tumor with no evidence of metastasis     6/29/2018 Surgery    Surgery       Procedure:  Subtotal gastrectomy showing 0.7 cm 2 out of 10 node positive moderately differentiated adenocarcinoma of the stomach.  YpT2 N1 M0 stage IIa     10/29/2018 Imaging    CT chest abdomen pelvis with contrast shows no evidence of metastasis or recurrence but does have bilateral basilar pulmonary infiltrates.  Was started on Levaquin 10/19/18.     4/19/2019 Imaging    CT chest, abdomen and pelvis IMPRESSION:  1. Considerable atelectasis at the lung bases and/or scarring similar to  prior without focal consolidation or effusion.  2. Stable appearance of the abdomen and pelvis without evidence for  recurrent disease; specifically, no new soft tissue nodular enhancement  with stable appearance of the surgical margin subtotal gastrectomy and  patulous appearance of the distal esophagus similar to prior. No new  peritoneal reticulation or fluid.     7/8/2019 Procedure    EGD with Dr. Matt, impression: Esophagitis.  Subtotal gastrectomy with normal-appearing mucosa.     11/8/2019 Imaging    CT chest abdomen pelvis with contrast shows enlarging paratracheal nodes     11/25/2019 Progression    PET/CT  IMPRESSION:  There is extensive hypermetabolic activity seen within the  right supraclavicular region, mediastinum and hilar regions as well as  centrally within the celiac axis. Findings all suggesting diffuse  metastatic disease in the lymph nodes within the chest and upper  Abdomen.    Patient opted out of immunotherapy or further chemotherapy wanted to go with watchful waiting     5/1/2020 Imaging    -5/1/2020 CT chest abdomen pelvis showed stable postsurgical changes from subtotal gastrectomy with no soft tissue enhancing mass, fluid collection, or inflammation.  There are stable mildly enlarged supraclavicular lymph nodes but no progression of metastatic involvement compared to the imaging of 1/31/2020 11/2/2020 Imaging    CT chest, abdomen and pelvis IMPRESSION:  Stable scarring at the lung bases bilaterally. Patient again  status post partial gastrectomy. No evidence of local recurrence or  metastatic disease. No progression of disease. Findings are all stable.        5/7/2021 Imaging    -5/7/2021 CT chest abdomen pelvis with contrast shows no evidence of recurrence and hemoglobin 10.4 with MCV 78.5 down from 12.5 in November with MCV 87 at that junction.  CMP unremarkable.     5/20/2021 -  Other Event    -5/20/2021 ferritin low end of normal 14.75 with iron low at 31 and saturation low 6% with elevated total iron binding capacity 560 and elevated transferrin 376 commensurate with iron deficiency anemia.       6/9/2021 -  Other Event      -6/9/2021 reports the patient from Dr. Matt showed no Helicobacter pylori, intestinal metaplasia, or dysplasia.  Anastomosis normal.  No gross evidence of recurrent gastric cancer.  Recommends next step of colonoscopy.     7/2/2021 Procedure    Colonoscopy: Internal hemorrhoids and diverticulosis without bleeding, otherwise normal.     8/9/2021 Imaging    CT chest, abdomen and pelvis IMPRESSION:  Stable appearance of the chest, abdomen and pelvis. Some  chronic  change is stable within the lung bases bilaterally. There is no  evidence of local recurrence or metastatic disease.     2/17/2022 Imaging    -2/17/2022 CT chest abdomen pelvis with contrast shows chronic interstitial changes lower lungs compared to May 2021 with no evidence of recurrent malignancy or metastasis in the abdomen or pelvis.  CBC, CMP, ferritin, iron profile entirely normal.     8/19/2022 Imaging    CT neck, chest, abdomen and pelvis IMPRESSION:  No evidence of pathologic cervical lymphadenopathy or other worrisome or  acute findings in the neck.     Overall stable CT appearance of the chest, abdomen and pelvis, without  evidence of new soft tissue finding including pathologic adenopathy  concerning for disease progression/recurrence. Areas of hypermetabolic  lymphadenopathy seen on 2019 PET/CT remain resolved.         HISTORY OF PRESENT ILLNESS:  The patient is a 58 y.o. female, here for follow up on history of gastric carcinoma.  Astrid is doing well with no new concerns.  She feels healthy.  Her appetite is normal, weight is stable.  No abdominal pain or swallowing issues.  No change in her bowel or bladder habits.      Past Medical History:   Diagnosis Date   • Abdominal pain    • Acid reflux    • Arthritis    • Gastric cancer (HCC)    • Gastric mass    • Gastric ulcer    • History of transfusion     no reaction 2018   • Wears glasses      Past Surgical History:   Procedure Laterality Date   • CERVICAL BIOPSY  W/ LOOP ELECTRODE EXCISION     • DIAGNOSTIC LAPAROSCOPY N/A 3/13/2018    Procedure: DIAGNOSTIC LAPAROSCOPY WITH BIOPSY, LYSIS OF ADHESIONS;  Surgeon: Nicole Crooks MD;  Location:  [facility];  Service: General   • ENDOSCOPY     • GASTRECTOMY N/A 6/29/2018    Procedure: SUBTOTAL GASTRECTOMY;  Surgeon: Nicole Crooks MD;  Location:  [facility];  Service: General   • STOMACH SURGERY  06/09/2018    For removal of mass   • TUBAL ABDOMINAL LIGATION     • VENOUS ACCESS DEVICE (PORT) INSERTION     •  "VENOUS ACCESS DEVICE (PORT) REMOVAL N/A 12/21/2018    Procedure: PORT REMOVAL;  Surgeon: Nicole Crooks MD;  Location: [facility];  Service: General       No Known Allergies    Family History and Social History reviewed and changed as necessary    REVIEW OF SYSTEM:   No new somatic complaints    PHYSICAL EXAM:  Well-developed, well-nourished healthy-appearing female in no distress.  BMI 33.8  No cervical, supraclavicular, axillary or inguinal nodes palpable on exam  Lungs clear to auscultation bilaterally, respirations regular and unlabored  Abdomen soft, non tender, non distended    Vitals:    03/06/23 1516   BP: 145/86   Pulse: 69   Resp: 18   Temp: 98 °F (36.7 °C)   SpO2: 96%   Weight: 83.9 kg (185 lb)   Height: 157.5 cm (62\")     Vitals:    03/06/23 1516   PainSc: 0-No pain          ECOG score: 0           Vitals reviewed.  Labs reviewed      Lab Results   Component Value Date    HGB 12.9 03/06/2023    HCT 41.8 03/06/2023    MCV 85.1 03/06/2023     03/06/2023    WBC 5.17 03/06/2023    NEUTROABS 2.59 03/06/2023    LYMPHSABS 1.92 03/06/2023    MONOSABS 0.48 03/06/2023    EOSABS 0.13 03/06/2023    BASOSABS 0.04 03/06/2023       Lab Results   Component Value Date    GLUCOSE 111 (H) 08/26/2022    BUN 12 08/26/2022    CREATININE 0.87 08/26/2022     08/26/2022    K 4.1 08/26/2022     (H) 08/26/2022    CO2 23.0 08/26/2022    CALCIUM 8.9 08/26/2022    PROTEINTOT 6.9 08/26/2022    ALBUMIN 4.20 08/26/2022    BILITOT 0.2 08/26/2022    ALKPHOS 84 08/26/2022    AST 19 08/26/2022    ALT 17 08/26/2022     Lab Results   Component Value Date    FERRITIN 16.02 03/06/2023    FERRITIN 17.47 08/26/2022    FERRITIN 29.80 02/17/2022         No radiology results for the last 30 days.        ASSESSMENT & PLAN:  1.  Gastric cancer:  53-year-old female with a three-month history of indigestion and upper abdominal pain.  She was started on omeprazole for presumed reflux, symptoms were not relieved.  She underwent an EGD " on 2/6/2018 with Dr. Matt that revealed a large, friable and ulcerated mass in the body of the stomach.  Biopsies returned moderately to poorly differentiated adenocarcinoma.  Staging studies including CT scan of the chest abdomen and pelvis were negative for distant disease.  She underwent diagnostic laparoscopic with peritoneal washings to complete staging on 3/13/2018.  Baseline CBC 3/9/2018 WBC 4820, hemoglobin 8.5, hematocrit 28.9%, platelet count 351,000, MCV 64.8, MCH 19.1, MCHC 29.4.  CEA normal at less than 0.50.  HER-2/erik testing from gastric body biopsy was negative.  CMP was normal other than for serum calcium slightly decreased at 8.6.  Received FLOT x8 4/5/2018 through 9/27/2018.  Genetic testing negative.  6/29/2018 subtotal gastrectomy showed 0.7 cm, 2 out of 10 node positive, moderately differentiated adenocarcinoma of the stomach pathologic T2 N1 M0 stage IIa.  Had PET scan 11/25/2019 showing right supraclavicular mediastinal and hilar adenopathy and celiac axis suggestive of terrance metastases and the patient opted out of further immunotherapy or chemotherapy.  Subsequent CAT scans showed no such enlarging nodes.  Repeat EGD 6/9/2021 showed normal anastomosis and no recurrence with colonoscopy showing internal hemorrhoids.  NCCN guideline recommendations for follow-up…  • H&P every 3-6 months through March 2020 and then every 6-12 months until March 2023 and then annually thereafter  • CBC and CMP as clinically indicated  • For subtotal gastrectomy, EGD as clinically indicated  • CT chest abdomen pelvis every 6-12 months until March 2020 and then annually out to March 2023 and/or can consider FDG PET as clinically indicated.  • Monitor for nutritional deficiencies of B12 and iron though less likely with subtotal gastrectomy and treat as indicated.    2.  Iron deficiency anemia: Patient with microcytic anemia noted at visit on 5/20/2021, referred to Dr. Matt for endoscopic evaluation.  She  underwent EGD on 6/9/2021 that was negative and also colonoscopy on 7/2/2021 that was negative other than for internal hemorrhoids and diverticulosis without bleeding.  She was started on oral iron.    -8/26/2022 [facility] Oncology/Hematology clinic follow-up: Astrid is doing well, no evidence of recurrent gastric cancer on clinical exam and no new worrisome symptoms. Current CT scans from 8/19/2022 were negative with no findings worrisome for disease recurrence or progression.  CBC from today is normal with Hemoglobin 12.6, hematocrit 40.7%, WBC 4.99, platelet count 186,000, ferritin, CMP, B12 are pending.  Those will be released via [employer] which she does have access to, I will notify her of any abnormal findings.  We will repeat CBC and iron studies again in 6 months and if normal at that time we will discontinue routine serum testing.  We will continue surveillance for her history of gastric cancer with repeat CT chest, abdomen and pelvis in 1 year at which time she will be 5 years out from surgery.    -3/6/2023 [facility] Oncology/Hematology clinic follow-up: Astrid continues to do well with no evidence of recurrent gastric cancer on clinical exam and no new worrisome symptoms.  Labs from today are unremarkable, no anemia, ferritin stable.  She will be due for surveillance CT chest, abdomen and pelvis in August and I have ordered that today, at that time she will be 5 years out from her subtotal gastrectomy for gastric cancer.    I spent 20 minutes caring for Astrid on this date of service. This time includes time spent by me in the following activities: preparing for the visit, reviewing tests, performing a medically appropriate examination and/or evaluation, ordering medications, tests, or procedures and documenting information in the medical record.     Kathy Salazar, APRN    03/06/2023

## 2023-08-18 ENCOUNTER — HOSPITAL ENCOUNTER (OUTPATIENT)
Dept: CT IMAGING | Facility: HOSPITAL | Age: 58
Discharge: HOME | End: 2023-08-18
Admitting: NURSE PRACTITIONER
Payer: COMMERCIAL

## 2023-08-18 DIAGNOSIS — Z85.028 HISTORY OF GASTRIC CANCER: ICD-10-CM

## 2023-08-18 LAB — CREAT BLDA-MCNC: 0.8 MG/DL (ref 0.6–1.3)

## 2023-08-18 PROCEDURE — 25510000001 IOPAMIDOL 61 % SOLUTION: Performed by: NURSE PRACTITIONER

## 2023-08-18 PROCEDURE — 82565 ASSAY OF CREATININE: CPT

## 2023-08-18 PROCEDURE — 74177 CT ABD & PELVIS W/CONTRAST: CPT

## 2023-08-18 PROCEDURE — 71260 CT THORAX DX C+: CPT

## 2023-08-18 RX ADMIN — IOPAMIDOL 85 ML: 612 INJECTION, SOLUTION INTRAVENOUS at 14:28

## 2023-08-28 ENCOUNTER — OFFICE VISIT (OUTPATIENT)
Dept: ONCOLOGY | Facility: CLINIC | Age: 58
End: 2023-08-28
Payer: COMMERCIAL

## 2023-08-28 ENCOUNTER — LAB (OUTPATIENT)
Dept: LAB | Facility: HOSPITAL | Age: 58
End: 2023-08-28
Payer: COMMERCIAL

## 2023-08-28 VITALS
HEART RATE: 69 BPM | SYSTOLIC BLOOD PRESSURE: 145 MMHG | WEIGHT: 189 LBS | OXYGEN SATURATION: 96 % | TEMPERATURE: 97.8 F | RESPIRATION RATE: 18 BRPM | DIASTOLIC BLOOD PRESSURE: 79 MMHG | HEIGHT: 62 IN | BODY MASS INDEX: 34.78 KG/M2

## 2023-08-28 DIAGNOSIS — Z85.028 HISTORY OF GASTRIC CANCER: ICD-10-CM

## 2023-08-28 DIAGNOSIS — C16.2 MALIGNANT NEOPLASM OF BODY OF STOMACH: Primary | Chronic | ICD-10-CM

## 2023-08-28 LAB
ALBUMIN SERPL-MCNC: 4 G/DL (ref 3.5–5.2)
ALBUMIN/GLOB SERPL: 1.2 G/DL
ALP SERPL-CCNC: 97 U/L (ref 39–117)
ALT SERPL W P-5'-P-CCNC: 13 U/L (ref 1–33)
ANION GAP SERPL CALCULATED.3IONS-SCNC: 11 MMOL/L (ref 5–15)
AST SERPL-CCNC: 20 U/L (ref 1–32)
BASOPHILS # BLD AUTO: 0.03 10*3/MM3 (ref 0–0.2)
BASOPHILS NFR BLD AUTO: 0.6 % (ref 0–1.5)
BILIRUB SERPL-MCNC: 0.2 MG/DL (ref 0–1.2)
BUN SERPL-MCNC: 13 MG/DL (ref 6–20)
BUN/CREAT SERPL: 15.9 (ref 7–25)
CALCIUM SPEC-SCNC: 9 MG/DL (ref 8.6–10.5)
CHLORIDE SERPL-SCNC: 106 MMOL/L (ref 98–107)
CO2 SERPL-SCNC: 25 MMOL/L (ref 22–29)
CREAT SERPL-MCNC: 0.82 MG/DL (ref 0.57–1)
DEPRECATED RDW RBC AUTO: 45.5 FL (ref 37–54)
EGFRCR SERPLBLD CKD-EPI 2021: 83 ML/MIN/1.73
EOSINOPHIL # BLD AUTO: 0.11 10*3/MM3 (ref 0–0.4)
EOSINOPHIL NFR BLD AUTO: 2.3 % (ref 0.3–6.2)
ERYTHROCYTE [DISTWIDTH] IN BLOOD BY AUTOMATED COUNT: 15 % (ref 12.3–15.4)
GLOBULIN UR ELPH-MCNC: 3.4 GM/DL
GLUCOSE SERPL-MCNC: 99 MG/DL (ref 65–99)
HCT VFR BLD AUTO: 36.9 % (ref 34–46.6)
HGB BLD-MCNC: 11.8 G/DL (ref 12–15.9)
IMM GRANULOCYTES # BLD AUTO: 0.01 10*3/MM3 (ref 0–0.05)
IMM GRANULOCYTES NFR BLD AUTO: 0.2 % (ref 0–0.5)
LYMPHOCYTES # BLD AUTO: 1.78 10*3/MM3 (ref 0.7–3.1)
LYMPHOCYTES NFR BLD AUTO: 36.6 % (ref 19.6–45.3)
MCH RBC QN AUTO: 26 PG (ref 26.6–33)
MCHC RBC AUTO-ENTMCNC: 32 G/DL (ref 31.5–35.7)
MCV RBC AUTO: 81.5 FL (ref 79–97)
MONOCYTES # BLD AUTO: 0.54 10*3/MM3 (ref 0.1–0.9)
MONOCYTES NFR BLD AUTO: 11.1 % (ref 5–12)
NEUTROPHILS NFR BLD AUTO: 2.39 10*3/MM3 (ref 1.7–7)
NEUTROPHILS NFR BLD AUTO: 49.2 % (ref 42.7–76)
PLATELET # BLD AUTO: 225 10*3/MM3 (ref 140–450)
PMV BLD AUTO: 10 FL (ref 6–12)
POTASSIUM SERPL-SCNC: 4.2 MMOL/L (ref 3.5–5.2)
PROT SERPL-MCNC: 7.4 G/DL (ref 6–8.5)
RBC # BLD AUTO: 4.53 10*6/MM3 (ref 3.77–5.28)
SODIUM SERPL-SCNC: 142 MMOL/L (ref 136–145)
WBC NRBC COR # BLD: 4.86 10*3/MM3 (ref 3.4–10.8)

## 2023-08-28 PROCEDURE — 99214 OFFICE O/P EST MOD 30 MIN: CPT | Performed by: INTERNAL MEDICINE

## 2023-08-28 PROCEDURE — 36415 COLL VENOUS BLD VENIPUNCTURE: CPT

## 2023-08-28 PROCEDURE — 80053 COMPREHEN METABOLIC PANEL: CPT

## 2023-08-28 PROCEDURE — 85025 COMPLETE CBC W/AUTO DIFF WBC: CPT

## 2023-08-28 NOTE — PROGRESS NOTES
CHIEF COMPLAINT: No new somatic complaints    Problem List:  Oncology/Hematology History Overview Note   1.  Gastric cancer:  53-year-old female with a three-month history of indigestion and upper abdominal pain.  She was started on omeprazole for presumed reflux, symptoms were not relieved.  She underwent an EGD on 2/6/2018 with Dr. Matt that revealed a large, friable and ulcerated mass in the body of the stomach.  Biopsies returned moderately to poorly differentiated adenocarcinoma.  Staging studies including CT scan of the chest abdomen and pelvis were negative for distant disease.  She underwent diagnostic laparoscopic with peritoneal washings to complete staging on 3/13/2018.  Baseline CBC 3/9/2018 WBC 4820, hemoglobin 8.5, hematocrit 28.9%, platelet count 351,000, MCV 64.8, MCH 19.1, MCHC 29.4.  CEA normal at less than 0.50.  HER-2/erik testing from gastric body biopsy was negative.  CMP was normal other than for serum calcium slightly decreased at 8.6.  Received FLOT x8 4/5/2018 through 9/27/2018.  Genetic testing negative.  6/29/2018 subtotal gastrectomy showed 0.7 cm, 2 out of 10 node positive, moderately differentiated adenocarcinoma of the stomach pathologic T2 N1 M0 stage IIa.  Had PET scan 11/25/2019 showing right supraclavicular mediastinal and hilar adenopathy and celiac axis suggestive of terrance metastases and the patient opted out of further immunotherapy or chemotherapy.  Subsequent CAT scans showed no such enlarging nodes.  Repeat EGD 6/9/2021 showed normal anastomosis and no recurrence with colonoscopy showing internal hemorrhoids.  NCCN guideline recommendations for follow-up.  H&P every 3-6 months through March 2020 and then every 6-12 months until March 2023 and then annually thereafter  CBC and CMP as clinically indicated  For subtotal gastrectomy, EGD as clinically indicated  CT chest abdomen pelvis every 6-12 months until March 2020 and then annually out to March 2023 and/or can consider  FDG PET as clinically indicated.  Monitor for nutritional deficiencies of B12 and iron though less likely with subtotal gastrectomy and treat as indicated.    2.  Iron deficiency anemia: Patient with microcytic anemia noted at visit on 5/20/2021, referred to Dr. Matt for endoscopic evaluation.  She underwent EGD on 6/9/2021 that was negative and also colonoscopy on 7/2/2021 that was negative other than for internal hemorrhoids and diverticulosis without bleeding.  She was started on oral iron.    -2/18/2022 [facility] Oncology/Hematology clinic follow-up: I reviewed current images and reports of CTs results as outlined above.  No evidence of recurrence.  Blood counts chemistries iron indices etc. all normal.  Oral repeat CBC again every 6 months until a year from now and from that point forward will do it just as clinically indicated.  No hint of iron deficiency or B12 deficiency which I would find to be unlikely with subtotal gastrectomy.  Follow-up per NCCN guidelines as outlined above.    -8/26/2022 [facility] Oncology/Hematology clinic follow-up: Astrid is doing well, no evidence of recurrent gastric cancer on clinical exam and no new worrisome symptoms. Current CT scans from 8/19/2022 were negative with no findings worrisome for disease recurrence or progression.  CBC from today is normal with Hemoglobin 12.6, hematocrit 40.7%, WBC 4.99, platelet count 186,000, ferritin, CMP, B12 are pending.  Those will be released via [employer] which she does have access to, I will notify her of any abnormal findings.  We will repeat CBC and iron studies again in 6 months and if normal at that time we will discontinue routine serum testing.  We will continue surveillance for her history of gastric cancer with repeat CT chest, abdomen and pelvis in 1 year at which time she will be 5 years out from surgery.    -3/6/2023 CBC normal with WBC 5.17, hemoglobin 12.9, hematocrit 41.8%, platelet count 232,000.  Ferritin stable at 16.02.   Serum iron 58, iron saturation 11%, transferrin 346, TIBC 516.  -3/6/2023 [facility] Oncology/Hematology clinic follow-up: Astrid continues to do well with no evidence of recurrent gastric cancer on clinical exam and no new worrisome symptoms.  Labs from today are unremarkable, no anemia, ferritin stable.  She will be due for surveillance CT chest, abdomen and pelvis in August and I have ordered that today, at that time she will be 5 years out from her subtotal gastrectomy for gastric cancer.    -8/18/2023 CT chest abdomen pelvis with contrast shows no evidence of disease and chronic lung changes in the lower lobes.    -8/28/2023 [facility] hematology oncology follow-up: She is 5 years out with no evidence of disease on imaging.  From this point forward she needs an H&P every 12 months which she can do with primary care, blood counts and chemistries and CTs from this point forward only as clinically indicated from an oncologic standpoint, and encourage primary care to monitor for nutritional deficiencies of B12 and iron.  From this point forward we will see her on an as-needed basis.     Malignant neoplasm of body of stomach   2/6/2018 Initial Diagnosis    Gastric cancer     2/6/2018 Procedure    EGD revealed gastric ulcerated mass in body of stomach.  Pathology returned:   Final Diagnosis    1. GASTRIC BODY BIOPSY:  Invasive moderate to poorly differentiated adenocarcinoma with ulceration (see comment).   2. GASTRIC ANTRUM BIOPSY:  Minimal chronic gastritis without activity.  No intestinal metaplasia or dysplasia identified.  No Helicobacter pylori-like organisms identified on routine stains.   HER2/erik testing negative  FLUORESCENCE IN-SITU HYBRIDIZATION (FISH) REPORT:  Negative/Not Amplified  HER2/WILL-17 Ratio: 1.3       2/9/2018 Imaging    CT abdomen/pelvis IMPRESSION:     There is mild thickening and prominence of the mid gastric wall of the  body of the stomach, middle third.     Extragastric mass is not identified.  "The lesser sac and retrogastric  spaces are clear.     Visceral tumor, adenopathy, stranding or caking is not currently  identified as described. There is no retroperitoneal or retrogastric  adenopathy. The lower lung zones are clear with no evidence of reactive  pleural effusion or occult mass. There is no liver metastasis and no  other acute focal CT abnormality is identified within the abdomen or  pelvis.     Incidental note is made of a nonobstructing stone right kidney right  lower pole.     3/9/2018 Imaging    CT Chest IMPRESSION:  No acute intrathoracic abnormality. No definite evidence of  metastatic disease.     3/13/2018 Procedure    Diagnostic laproscopy with biopsy and lysis of adhesions.  Pathology returned:  Final Diagnosis   PERITONEAL NODULE, EXCISIONAL BIOPSY:  Benign fibrotic nodule. (See comment)  JFJ/[id number]    Electronically signed by Boaz Jerez MD on 3/15/2018 at 1624   Comment See result below    The biopsy shows a lobulated fibrotic nodule with some intermixed lymphoid cells. The nodule does not appear neoplastic. One cannot entirely exclude a \"burned out\" lymph node, or other implant. There is no evidence of carcinoma.      Abdominal wall washings benign:  Final Diagnosis    1. ABDOMINAL WASH, WALL:  Negative for malignancy.  Scant cellularity; chronic inflammatory cells and scattered benign mesothelial cells.   2. ABDOMINAL WASH, WALL:  Negative for malignancy.  Scant cellularity; chronic inflammatory cells and scattered benign mesothelial cells.   3. ABDOMINAL WASH, WALL:  Negative for malignancy.  Benign mesothelial cells and mixed inflammatory cells.            4/5/2018 - 9/27/2018 Chemotherapy    OP GASTRIC FLOT OXALIplatin / Leucovorin / Fluorouracil/Taxotere  x8     5/9/2018 Genetic Testing    Genetic testing negative for cancer next panel     5/17/2018 Imaging    CT chest abdomen and pelvis showed Shrinkage of the gastric tumor with no evidence of metastasis     6/29/2018 Surgery    " Surgery       Procedure:  Subtotal gastrectomy showing 0.7 cm 2 out of 10 node positive moderately differentiated adenocarcinoma of the stomach.  YpT2 N1 M0 stage IIa     10/29/2018 Imaging    CT chest abdomen pelvis with contrast shows no evidence of metastasis or recurrence but does have bilateral basilar pulmonary infiltrates.  Was started on Levaquin 10/19/18.     4/19/2019 Imaging    CT chest, abdomen and pelvis IMPRESSION:  1. Considerable atelectasis at the lung bases and/or scarring similar to  prior without focal consolidation or effusion.  2. Stable appearance of the abdomen and pelvis without evidence for  recurrent disease; specifically, no new soft tissue nodular enhancement  with stable appearance of the surgical margin subtotal gastrectomy and  patulous appearance of the distal esophagus similar to prior. No new  peritoneal reticulation or fluid.     7/8/2019 Procedure    EGD with Dr. Matt, impression: Esophagitis.  Subtotal gastrectomy with normal-appearing mucosa.     11/8/2019 Imaging    CT chest abdomen pelvis with contrast shows enlarging paratracheal nodes     11/25/2019 Progression    PET/CT IMPRESSION:  There is extensive hypermetabolic activity seen within the  right supraclavicular region, mediastinum and hilar regions as well as  centrally within the celiac axis. Findings all suggesting diffuse  metastatic disease in the lymph nodes within the chest and upper  Abdomen.    Patient opted out of immunotherapy or further chemotherapy wanted to go with watchful waiting     5/1/2020 Imaging    -5/1/2020 CT chest abdomen pelvis showed stable postsurgical changes from subtotal gastrectomy with no soft tissue enhancing mass, fluid collection, or inflammation.  There are stable mildly enlarged supraclavicular lymph nodes but no progression of metastatic involvement compared to the imaging of 1/31/2020 11/2/2020 Imaging    CT chest, abdomen and pelvis IMPRESSION:  Stable scarring at the lung  bases bilaterally. Patient again  status post partial gastrectomy. No evidence of local recurrence or  metastatic disease. No progression of disease. Findings are all stable.        5/7/2021 Imaging    -5/7/2021 CT chest abdomen pelvis with contrast shows no evidence of recurrence and hemoglobin 10.4 with MCV 78.5 down from 12.5 in November with MCV 87 at that junction.  CMP unremarkable.     5/20/2021 -  Other Event    -5/20/2021 ferritin low end of normal 14.75 with iron low at 31 and saturation low 6% with elevated total iron binding capacity 560 and elevated transferrin 376 commensurate with iron deficiency anemia.       6/9/2021 -  Other Event      -6/9/2021 reports the patient from Dr. Matt showed no Helicobacter pylori, intestinal metaplasia, or dysplasia.  Anastomosis normal.  No gross evidence of recurrent gastric cancer.  Recommends next step of colonoscopy.     7/2/2021 Procedure    Colonoscopy: Internal hemorrhoids and diverticulosis without bleeding, otherwise normal.     8/9/2021 Imaging    CT chest, abdomen and pelvis IMPRESSION:  Stable appearance of the chest, abdomen and pelvis. Some  chronic change is stable within the lung bases bilaterally. There is no  evidence of local recurrence or metastatic disease.     2/17/2022 Imaging    -2/17/2022 CT chest abdomen pelvis with contrast shows chronic interstitial changes lower lungs compared to May 2021 with no evidence of recurrent malignancy or metastasis in the abdomen or pelvis.  CBC, CMP, ferritin, iron profile entirely normal.     8/19/2022 Imaging    CT neck, chest, abdomen and pelvis IMPRESSION:  No evidence of pathologic cervical lymphadenopathy or other worrisome or  acute findings in the neck.     Overall stable CT appearance of the chest, abdomen and pelvis, without  evidence of new soft tissue finding including pathologic adenopathy  concerning for disease progression/recurrence. Areas of hypermetabolic  lymphadenopathy seen on 2019  "PET/CT remain resolved.         HISTORY OF PRESENT ILLNESS:  The patient is a 58 y.o. female, here for follow up on management of history of gastric cancer.    Past Medical History:   Diagnosis Date    Abdominal pain     Acid reflux     Arthritis     Gastric cancer     Gastric mass     Gastric ulcer     History of transfusion     no reaction 2018    Wears glasses      Past Surgical History:   Procedure Laterality Date    CERVICAL BIOPSY  W/ LOOP ELECTRODE EXCISION      DIAGNOSTIC LAPAROSCOPY N/A 3/13/2018    Procedure: DIAGNOSTIC LAPAROSCOPY WITH BIOPSY, LYSIS OF ADHESIONS;  Surgeon: Nicole Crooks MD;  Location:  [facility];  Service: General    ENDOSCOPY      GASTRECTOMY N/A 6/29/2018    Procedure: SUBTOTAL GASTRECTOMY;  Surgeon: Nicole Crooks MD;  Location:  [facility];  Service: General    STOMACH SURGERY  06/09/2018    For removal of mass    TUBAL ABDOMINAL LIGATION      VENOUS ACCESS DEVICE (PORT) INSERTION      VENOUS ACCESS DEVICE (PORT) REMOVAL N/A 12/21/2018    Procedure: PORT REMOVAL;  Surgeon: Nicole Crooks MD;  Location:  [facility];  Service: General       No Known Allergies    Family History and Social History reviewed and changed as necessary    REVIEW OF SYSTEM:   No new somatic complaints    PHYSICAL EXAM:  No jaundice or icterus or pallor.    Vitals:    08/28/23 1458   Height: 157.5 cm (62\")     There were no vitals filed for this visit.       ECOG score: 0           Vitals reviewed.    ECOG: (0) Fully Active - Able to Carry On All Pre-disease Performance Without Restriction    Lab Results   Component Value Date    HGB 12.9 03/06/2023    HCT 41.8 03/06/2023    MCV 85.1 03/06/2023     03/06/2023    WBC 5.17 03/06/2023    NEUTROABS 2.59 03/06/2023    LYMPHSABS 1.92 03/06/2023    MONOSABS 0.48 03/06/2023    EOSABS 0.13 03/06/2023    BASOSABS 0.04 03/06/2023       Lab Results   Component Value Date    GLUCOSE 111 (H) 08/26/2022    BUN 12 08/26/2022    CREATININE 0.80 08/18/2023     " 08/26/2022    K 4.1 08/26/2022     (H) 08/26/2022    CO2 23.0 08/26/2022    CALCIUM 8.9 08/26/2022    PROTEINTOT 6.9 08/26/2022    ALBUMIN 4.20 08/26/2022    BILITOT 0.2 08/26/2022    ALKPHOS 84 08/26/2022    AST 19 08/26/2022    ALT 17 08/26/2022             ASSESSMENT & PLAN:  1.  Gastric cancer:  53-year-old female with a three-month history of indigestion and upper abdominal pain.  She was started on omeprazole for presumed reflux, symptoms were not relieved.  She underwent an EGD on 2/6/2018 with Dr. Matt that revealed a large, friable and ulcerated mass in the body of the stomach.  Biopsies returned moderately to poorly differentiated adenocarcinoma.  Staging studies including CT scan of the chest abdomen and pelvis were negative for distant disease.  She underwent diagnostic laparoscopic with peritoneal washings to complete staging on 3/13/2018.  Baseline CBC 3/9/2018 WBC 4820, hemoglobin 8.5, hematocrit 28.9%, platelet count 351,000, MCV 64.8, MCH 19.1, MCHC 29.4.  CEA normal at less than 0.50.  HER-2/erik testing from gastric body biopsy was negative.  CMP was normal other than for serum calcium slightly decreased at 8.6.  Received FLOT x8 4/5/2018 through 9/27/2018.  Genetic testing negative.  6/29/2018 subtotal gastrectomy showed 0.7 cm, 2 out of 10 node positive, moderately differentiated adenocarcinoma of the stomach pathologic T2 N1 M0 stage IIa.  Had PET scan 11/25/2019 showing right supraclavicular mediastinal and hilar adenopathy and celiac axis suggestive of terrance metastases and the patient opted out of further immunotherapy or chemotherapy.  Subsequent CAT scans showed no such enlarging nodes.  Repeat EGD 6/9/2021 showed normal anastomosis and no recurrence with colonoscopy showing internal hemorrhoids.  NCCN guideline recommendations for follow-up.  H&P every 3-6 months through March 2020 and then every 6-12 months until March 2023 and then annually thereafter  CBC and CMP as clinically  indicated  For subtotal gastrectomy, EGD as clinically indicated  CT chest abdomen pelvis every 6-12 months until March 2020 and then annually out to March 2023 and/or can consider FDG PET as clinically indicated.  Monitor for nutritional deficiencies of B12 and iron though less likely with subtotal gastrectomy and treat as indicated.    2.  Iron deficiency anemia: Patient with microcytic anemia noted at visit on 5/20/2021, referred to Dr. Matt for endoscopic evaluation.  She underwent EGD on 6/9/2021 that was negative and also colonoscopy on 7/2/2021 that was negative other than for internal hemorrhoids and diverticulosis without bleeding.  She was started on oral iron.    -2/18/2022 [facility] Oncology/Hematology clinic follow-up: I reviewed current images and reports of CTs results as outlined above.  No evidence of recurrence.  Blood counts chemistries iron indices etc. all normal.  Oral repeat CBC again every 6 months until a year from now and from that point forward will do it just as clinically indicated.  No hint of iron deficiency or B12 deficiency which I would find to be unlikely with subtotal gastrectomy.  Follow-up per NCCN guidelines as outlined above.    -8/26/2022 [facility] Oncology/Hematology clinic follow-up: Astrid is doing well, no evidence of recurrent gastric cancer on clinical exam and no new worrisome symptoms. Current CT scans from 8/19/2022 were negative with no findings worrisome for disease recurrence or progression.  CBC from today is normal with Hemoglobin 12.6, hematocrit 40.7%, WBC 4.99, platelet count 186,000, ferritin, CMP, B12 are pending.  Those will be released via [employer] which she does have access to, I will notify her of any abnormal findings.  We will repeat CBC and iron studies again in 6 months and if normal at that time we will discontinue routine serum testing.  We will continue surveillance for her history of gastric cancer with repeat CT chest, abdomen and pelvis in 1  year at which time she will be 5 years out from surgery.    -3/6/2023 CBC normal with WBC 5.17, hemoglobin 12.9, hematocrit 41.8%, platelet count 232,000.  Ferritin stable at 16.02.  Serum iron 58, iron saturation 11%, transferrin 346, TIBC 516.  -3/6/2023 [facility] Oncology/Hematology clinic follow-up: Astrid continues to do well with no evidence of recurrent gastric cancer on clinical exam and no new worrisome symptoms.  Labs from today are unremarkable, no anemia, ferritin stable.  She will be due for surveillance CT chest, abdomen and pelvis in August and I have ordered that today, at that time she will be 5 years out from her subtotal gastrectomy for gastric cancer.    -8/18/2023 CT chest abdomen pelvis with contrast shows no evidence of disease and chronic lung changes in the lower lobes.    -8/28/2023 [facility] hematology oncology follow-up: She is 5 years out with no evidence of disease on imaging.  From this point forward she needs an H&P every 12 months which she can do with primary care, blood counts and chemistries and CTs from this point forward only as clinically indicated from an oncologic standpoint, and encourage primary care to monitor for nutritional deficiencies of B12 and iron.  From this point forward we will see her on an as-needed basis.    Total time of care today inclusive of time spent today reviewing interval images and reports thereof of images since last we saw her and during visit translating that information to patient putting forth plan as outlined above took 30 minutes of patient care time throughout the day today.  Kaden Oswald MD    08/28/2023

## 2023-08-28 NOTE — LETTER
August 28, 2023       No Recipients    Patient: Astrid Nobles   YOB: 1965   Date of Visit: 8/28/2023     Dear Can Matt MD:       Thank you for referring Astrid Nobles to me for evaluation. Below are the relevant portions of my assessment and plan of care.    If you have questions, please do not hesitate to call me. I look forward to following Astrid along with you.         Sincerely,        Kaden Oswald MD        CC:   No Recipients    Kaden Oswald MD  08/28/23 1513  Sign when Signing Visit  CHIEF COMPLAINT: No new somatic complaints    Problem List:  Oncology/Hematology History Overview Note   1.  Gastric cancer:  53-year-old female with a three-month history of indigestion and upper abdominal pain.  She was started on omeprazole for presumed reflux, symptoms were not relieved.  She underwent an EGD on 2/6/2018 with Dr. Matt that revealed a large, friable and ulcerated mass in the body of the stomach.  Biopsies returned moderately to poorly differentiated adenocarcinoma.  Staging studies including CT scan of the chest abdomen and pelvis were negative for distant disease.  She underwent diagnostic laparoscopic with peritoneal washings to complete staging on 3/13/2018.  Baseline CBC 3/9/2018 WBC 4820, hemoglobin 8.5, hematocrit 28.9%, platelet count 351,000, MCV 64.8, MCH 19.1, MCHC 29.4.  CEA normal at less than 0.50.  HER-2/erik testing from gastric body biopsy was negative.  CMP was normal other than for serum calcium slightly decreased at 8.6.  Received FLOT x8 4/5/2018 through 9/27/2018.  Genetic testing negative.  6/29/2018 subtotal gastrectomy showed 0.7 cm, 2 out of 10 node positive, moderately differentiated adenocarcinoma of the stomach pathologic T2 N1 M0 stage IIa.  Had PET scan 11/25/2019 showing right supraclavicular mediastinal and hilar adenopathy and celiac axis suggestive of terrance metastases and the patient opted out of further immunotherapy or chemotherapy.   Subsequent CAT scans showed no such enlarging nodes.  Repeat EGD 6/9/2021 showed normal anastomosis and no recurrence with colonoscopy showing internal hemorrhoids.  NCCN guideline recommendations for follow-up.  H&P every 3-6 months through March 2020 and then every 6-12 months until March 2023 and then annually thereafter  CBC and CMP as clinically indicated  For subtotal gastrectomy, EGD as clinically indicated  CT chest abdomen pelvis every 6-12 months until March 2020 and then annually out to March 2023 and/or can consider FDG PET as clinically indicated.  Monitor for nutritional deficiencies of B12 and iron though less likely with subtotal gastrectomy and treat as indicated.    2.  Iron deficiency anemia: Patient with microcytic anemia noted at visit on 5/20/2021, referred to Dr. Matt for endoscopic evaluation.  She underwent EGD on 6/9/2021 that was negative and also colonoscopy on 7/2/2021 that was negative other than for internal hemorrhoids and diverticulosis without bleeding.  She was started on oral iron.    -2/18/2022 [facility] follow-up: I reviewed current images and reports of CTs results as outlined above.  No evidence of recurrence.  Blood counts chemistries iron indices etc. all normal.  Oral repeat CBC again every 6 months until a year from now and from that point forward will do it just as clinically indicated.  No hint of iron deficiency or B12 deficiency which I would find to be unlikely with subtotal gastrectomy.  Follow-up per NCCN guidelines as outlined above.    -8/26/2022 [facility] follow-up: Astrid is doing well, no evidence of recurrent gastric cancer on clinical exam and no new worrisome symptoms. Current CT scans from 8/19/2022 were negative with no findings worrisome for disease recurrence or progression.  CBC from today is normal with Hemoglobin 12.6, hematocrit 40.7%, WBC 4.99, platelet count 186,000, ferritin, CMP, B12 are  pending.  Those will be released via [facility] [employer] which she does have access to, I will notify her of any abnormal findings.  We will repeat CBC and iron studies again in 6 months and if normal at that time we will discontinue routine serum testing.  We will continue surveillance for her history of gastric cancer with repeat CT chest, abdomen and pelvis in 1 year at which time she will be 5 years out from surgery.    -3/6/2023 CBC normal with WBC 5.17, hemoglobin 12.9, hematocrit 41.8%, platelet count 232,000.  Ferritin stable at 16.02.  Serum iron 58, iron saturation 11%, transferrin 346, TIBC 516.  -3/6/2023 [facility]/Hematology clinic follow-up: Astrid continues to do well with no evidence of recurrent gastric cancer on clinical exam and no new worrisome symptoms.  Labs from today are unremarkable, no anemia, ferritin stable.  She will be due for surveillance CT chest, abdomen and pelvis in August and I have ordered that today, at that time she will be 5 years out from her subtotal gastrectomy for gastric cancer.    -8/18/2023 CT chest abdomen pelvis with contrast shows no evidence of disease and chronic lung changes in the lower lobes.    -8/28/2023 [facility] hematology oncology follow-up: She is 5 years out with no evidence of disease on imaging.  From this point forward she needs an H&P every 12 months which she can do with primary care, blood counts and chemistries and CTs from this point forward only as clinically indicated from an oncologic standpoint, and encourage primary care to monitor for nutritional deficiencies of B12 and iron.  From this point forward we will see her on an as-needed basis.     Malignant neoplasm of body of stomach   2/6/2018 Initial Diagnosis    Gastric cancer     2/6/2018 Procedure    EGD revealed gastric ulcerated mass in body of stomach.  Pathology returned:   Final Diagnosis    1. GASTRIC BODY BIOPSY:  Invasive moderate to poorly differentiated adenocarcinoma with ulceration  "(see comment).   2. GASTRIC ANTRUM BIOPSY:  Minimal chronic gastritis without activity.  No intestinal metaplasia or dysplasia identified.  No Helicobacter pylori-like organisms identified on routine stains.   HER2/erik testing negative  FLUORESCENCE IN-SITU HYBRIDIZATION (FISH) REPORT:  Negative/Not Amplified  HER2/WILL-17 Ratio: 1.3       2/9/2018 Imaging    CT abdomen/pelvis IMPRESSION:     There is mild thickening and prominence of the mid gastric wall of the  body of the stomach, middle third.     Extragastric mass is not identified. The lesser sac and retrogastric  spaces are clear.     Visceral tumor, adenopathy, stranding or caking is not currently  identified as described. There is no retroperitoneal or retrogastric  adenopathy. The lower lung zones are clear with no evidence of reactive  pleural effusion or occult mass. There is no liver metastasis and no  other acute focal CT abnormality is identified within the abdomen or  pelvis.     Incidental note is made of a nonobstructing stone right kidney right  lower pole.     3/9/2018 Imaging    CT Chest IMPRESSION:  No acute intrathoracic abnormality. No definite evidence of  metastatic disease.     3/13/2018 Procedure    Diagnostic laproscopy with biopsy and lysis of adhesions.  Pathology returned:  Final Diagnosis   PERITONEAL NODULE, EXCISIONAL BIOPSY:  Benign fibrotic nodule. (See comment)  JFJ/klb    Electronically signed by Boaz Jerez MD on 3/15/2018 at 1624   Comment See result below    The biopsy shows a lobulated fibrotic nodule with some intermixed lymphoid cells. The nodule does not appear neoplastic. One cannot entirely exclude a \"burned out\" lymph node, or other implant. There is no evidence of carcinoma.      Abdominal wall washings benign:  Final Diagnosis    1. ABDOMINAL WASH, WALL:  Negative for malignancy.  Scant cellularity; chronic inflammatory cells and scattered benign mesothelial cells.   2. ABDOMINAL WASH, WALL:  Negative for " malignancy.  Scant cellularity; chronic inflammatory cells and scattered benign mesothelial cells.   3. ABDOMINAL WASH, WALL:  Negative for malignancy.  Benign mesothelial cells and mixed inflammatory cells.            4/5/2018 - 9/27/2018 Chemotherapy    OP GASTRIC FLOT OXALIplatin / Leucovorin / Fluorouracil/Taxotere  x8     5/9/2018 Genetic Testing    Genetic testing negative for cancer next panel     5/17/2018 Imaging    CT chest abdomen and pelvis showed Shrinkage of the gastric tumor with no evidence of metastasis     6/29/2018 Surgery    Surgery       Procedure:  Subtotal gastrectomy showing 0.7 cm 2 out of 10 node positive moderately differentiated adenocarcinoma of the stomach.  YpT2 N1 M0 stage IIa     10/29/2018 Imaging    CT chest abdomen pelvis with contrast shows no evidence of metastasis or recurrence but does have bilateral basilar pulmonary infiltrates.  Was started on Levaquin 10/19/18.     4/19/2019 Imaging    CT chest, abdomen and pelvis IMPRESSION:  1. Considerable atelectasis at the lung bases and/or scarring similar to  prior without focal consolidation or effusion.  2. Stable appearance of the abdomen and pelvis without evidence for  recurrent disease; specifically, no new soft tissue nodular enhancement  with stable appearance of the surgical margin subtotal gastrectomy and  patulous appearance of the distal esophagus similar to prior. No new  peritoneal reticulation or fluid.     7/8/2019 Procedure    EGD with Dr. Matt, impression: Esophagitis.  Subtotal gastrectomy with normal-appearing mucosa.     11/8/2019 Imaging    CT chest abdomen pelvis with contrast shows enlarging paratracheal nodes     11/25/2019 Progression    PET/CT IMPRESSION:  There is extensive hypermetabolic activity seen within the  right supraclavicular region, mediastinum and hilar regions as well as  centrally within the celiac axis. Findings all suggesting diffuse  metastatic disease in the lymph nodes within the  chest and upper  Abdomen.    Patient opted out of immunotherapy or further chemotherapy wanted to go with watchful waiting     5/1/2020 Imaging    -5/1/2020 CT chest abdomen pelvis showed stable postsurgical changes from subtotal gastrectomy with no soft tissue enhancing mass, fluid collection, or inflammation.  There are stable mildly enlarged supraclavicular lymph nodes but no progression of metastatic involvement compared to the imaging of 1/31/2020 11/2/2020 Imaging    CT chest, abdomen and pelvis IMPRESSION:  Stable scarring at the lung bases bilaterally. Patient again  status post partial gastrectomy. No evidence of local recurrence or  metastatic disease. No progression of disease. Findings are all stable.        5/7/2021 Imaging    -5/7/2021 CT chest abdomen pelvis with contrast shows no evidence of recurrence and hemoglobin 10.4 with MCV 78.5 down from 12.5 in November with MCV 87 at that junction.  CMP unremarkable.     5/20/2021 -  Other Event    -5/20/2021 ferritin low end of normal 14.75 with iron low at 31 and saturation low 6% with elevated total iron binding capacity 560 and elevated transferrin 376 commensurate with iron deficiency anemia.       6/9/2021 -  Other Event      -6/9/2021 reports the patient from Dr. Matt showed no Helicobacter pylori, intestinal metaplasia, or dysplasia.  Anastomosis normal.  No gross evidence of recurrent gastric cancer.  Recommends next step of colonoscopy.     7/2/2021 Procedure    Colonoscopy: Internal hemorrhoids and diverticulosis without bleeding, otherwise normal.     8/9/2021 Imaging    CT chest, abdomen and pelvis IMPRESSION:  Stable appearance of the chest, abdomen and pelvis. Some  chronic change is stable within the lung bases bilaterally. There is no  evidence of local recurrence or metastatic disease.     2/17/2022 Imaging    -2/17/2022 CT chest abdomen pelvis with contrast shows chronic interstitial changes lower lungs compared to May 2021 with  "no evidence of recurrent malignancy or metastasis in the abdomen or pelvis.  CBC, CMP, ferritin, iron profile entirely normal.     8/19/2022 Imaging    CT neck, chest, abdomen and pelvis IMPRESSION:  No evidence of pathologic cervical lymphadenopathy or other worrisome or  acute findings in the neck.     Overall stable CT appearance of the chest, abdomen and pelvis, without  evidence of new soft tissue finding including pathologic adenopathy  concerning for disease progression/recurrence. Areas of hypermetabolic  lymphadenopathy seen on 2019 PET/CT remain resolved.         HISTORY OF PRESENT ILLNESS:  The patient is a 58 y.o. female, here for follow up on management of history of gastric cancer.    Past Medical History:   Diagnosis Date    Abdominal pain     Acid reflux     Arthritis     Gastric cancer     Gastric mass     Gastric ulcer     History of transfusion     no reaction 2018    Wears glasses      Past Surgical History:   Procedure Laterality Date    CERVICAL BIOPSY  W/ LOOP ELECTRODE EXCISION      DIAGNOSTIC LAPAROSCOPY N/A 3/13/2018    Procedure: DIAGNOSTIC LAPAROSCOPY WITH BIOPSY, LYSIS OF ADHESIONS;  Surgeon: Nicole Crooks MD;  Location:  [facility];  Service: General    ENDOSCOPY      GASTRECTOMY N/A 6/29/2018    Procedure: SUBTOTAL GASTRECTOMY;  Surgeon: Nicole Crooks MD;  Location:  [facility];  Service: General    STOMACH SURGERY  06/09/2018    For removal of mass    TUBAL ABDOMINAL LIGATION      VENOUS ACCESS DEVICE (PORT) INSERTION      VENOUS ACCESS DEVICE (PORT) REMOVAL N/A 12/21/2018    Procedure: PORT REMOVAL;  Surgeon: Nicole Crooks MD;  Location:  [facility];  Service: General       No Known Allergies    Family History and Social History reviewed and changed as necessary    REVIEW OF SYSTEM:   No new somatic complaints    PHYSICAL EXAM:  No jaundice or icterus or pallor.    Vitals:    08/28/23 1458   Height: 157.5 cm (62\")     There were no vitals filed for this visit.   "     ECOG score: 0           Vitals reviewed.    ECOG: (0) Fully Active - Able to Carry On All Pre-disease Performance Without Restriction    Lab Results   Component Value Date    HGB 12.9 03/06/2023    HCT 41.8 03/06/2023    MCV 85.1 03/06/2023     03/06/2023    WBC 5.17 03/06/2023    NEUTROABS 2.59 03/06/2023    LYMPHSABS 1.92 03/06/2023    MONOSABS 0.48 03/06/2023    EOSABS 0.13 03/06/2023    BASOSABS 0.04 03/06/2023       Lab Results   Component Value Date    GLUCOSE 111 (H) 08/26/2022    BUN 12 08/26/2022    CREATININE 0.80 08/18/2023     08/26/2022    K 4.1 08/26/2022     (H) 08/26/2022    CO2 23.0 08/26/2022    CALCIUM 8.9 08/26/2022    PROTEINTOT 6.9 08/26/2022    ALBUMIN 4.20 08/26/2022    BILITOT 0.2 08/26/2022    ALKPHOS 84 08/26/2022    AST 19 08/26/2022    ALT 17 08/26/2022             ASSESSMENT & PLAN:  1.  Gastric cancer:  53-year-old female with a three-month history of indigestion and upper abdominal pain.  She was started on omeprazole for presumed reflux, symptoms were not relieved.  She underwent an EGD on 2/6/2018 with Dr. Matt that revealed a large, friable and ulcerated mass in the body of the stomach.  Biopsies returned moderately to poorly differentiated adenocarcinoma.  Staging studies including CT scan of the chest abdomen and pelvis were negative for distant disease.  She underwent diagnostic laparoscopic with peritoneal washings to complete staging on 3/13/2018.  Baseline CBC 3/9/2018 WBC 4820, hemoglobin 8.5, hematocrit 28.9%, platelet count 351,000, MCV 64.8, MCH 19.1, MCHC 29.4.  CEA normal at less than 0.50.  HER-2/erik testing from gastric body biopsy was negative.  CMP was normal other than for serum calcium slightly decreased at 8.6.  Received FLOT x8 4/5/2018 through 9/27/2018.  Genetic testing negative.  6/29/2018 subtotal gastrectomy showed 0.7 cm, 2 out of 10 node positive, moderately differentiated adenocarcinoma of the stomach pathologic T2 N1 M0 stage  IIa.  Had PET scan 11/25/2019 showing right supraclavicular mediastinal and hilar adenopathy and celiac axis suggestive of terrance metastases and the patient opted out of further immunotherapy or chemotherapy.  Subsequent CAT scans showed no such enlarging nodes.  Repeat EGD 6/9/2021 showed normal anastomosis and no recurrence with colonoscopy showing internal hemorrhoids.  NCCN guideline recommendations for follow-up.  H&P every 3-6 months through March 2020 and then every 6-12 months until March 2023 and then annually thereafter  CBC and CMP as clinically indicated  For subtotal gastrectomy, EGD as clinically indicated  CT chest abdomen pelvis every 6-12 months until March 2020 and then annually out to March 2023 and/or can consider FDG PET as clinically indicated.  Monitor for nutritional deficiencies of B12 and iron though less likely with subtotal gastrectomy and treat as indicated.    2.  Iron deficiency anemia: Patient with microcytic anemia noted at visit on 5/20/2021, referred to Dr. Matt for endoscopic evaluation.  She underwent EGD on 6/9/2021 that was negative and also colonoscopy on 7/2/2021 that was negative other than for internal hemorrhoids and diverticulosis without bleeding.  She was started on oral iron.    -2/18/2022 [facility] follow-up: I reviewed current images and reports of CTs results as outlined above.  No evidence of recurrence.  Blood counts chemistries iron indices etc. all normal.  Oral repeat CBC again every 6 months until a year from now and from that point forward will do it just as clinically indicated.  No hint of iron deficiency or B12 deficiency which I would find to be unlikely with subtotal gastrectomy.  Follow-up per NCCN guidelines as outlined above.    -8/26/2022 [facility] follow-up: Astrid is doing well, no evidence of recurrent gastric cancer on clinical exam and no new worrisome symptoms. Current CT scans from 8/19/2022  were negative with no findings worrisome for disease recurrence or progression.  CBC from today is normal with Hemoglobin 12.6, hematocrit 40.7%, WBC 4.99, platelet count 186,000, ferritin, CMP, B12 are pending.  Those will be released via [employer] which she does have access to, I will notify her of any abnormal findings.  We will repeat CBC and iron studies again in 6 months and if normal at that time we will discontinue routine serum testing.  We will continue surveillance for her history of gastric cancer with repeat CT chest, abdomen and pelvis in 1 year at which time she will be 5 years out from surgery.    -3/6/2023 CBC normal with WBC 5.17, hemoglobin 12.9, hematocrit 41.8%, platelet count 232,000.  Ferritin stable at 16.02.  Serum iron 58, iron saturation 11%, transferrin 346, TIBC 516.  -3/6/2023 [facility] Oncology/Hematology clinic follow-up: Astrid continues to do well with no evidence of recurrent gastric cancer on clinical exam and no new worrisome symptoms.  Labs from today are unremarkable, no anemia, ferritin stable.  She will be due for surveillance CT chest, abdomen and pelvis in August and I have ordered that today, at that time she will be 5 years out from her subtotal gastrectomy for gastric cancer.    -8/18/2023 CT chest abdomen pelvis with contrast shows no evidence of disease and chronic lung changes in the lower lobes.    -8/28/2023 [facility] hematology oncology follow-up: She is 5 years out with no evidence of disease on imaging.  From this point forward she needs an H&P every 12 months which she can do with primary care, blood counts and chemistries and CTs from this point forward only as clinically indicated from an oncologic standpoint, and encourage primary care to monitor for nutritional deficiencies of B12 and iron.  From this point forward we will see her on an as-needed basis.    Total time of care today inclusive of time spent today reviewing interval images and reports thereof of  images since last we saw her and during visit translating that information to patient putting forth plan as outlined above took 30 minutes of patient care time throughout the day today.  Kaden Oswald MD    08/28/2023

## 2023-09-13 ENCOUNTER — HOSPITAL ENCOUNTER (OUTPATIENT)
Dept: MAMMOGRAPHY | Facility: HOSPITAL | Age: 58
Discharge: HOME | End: 2023-09-13
Admitting: OBSTETRICS & GYNECOLOGY
Payer: COMMERCIAL

## 2023-09-13 DIAGNOSIS — Z12.31 BREAST CANCER SCREENING BY MAMMOGRAM: ICD-10-CM

## 2023-09-13 PROCEDURE — 77063 BREAST TOMOSYNTHESIS BI: CPT

## 2023-09-13 PROCEDURE — 77067 SCR MAMMO BI INCL CAD: CPT

## 2023-12-15 ENCOUNTER — OFFICE VISIT (OUTPATIENT)
Dept: FAMILY MEDICINE CLINIC | Facility: CLINIC | Age: 58
End: 2023-12-15
Payer: COMMERCIAL

## 2023-12-15 VITALS
HEART RATE: 71 BPM | BODY MASS INDEX: 34.48 KG/M2 | DIASTOLIC BLOOD PRESSURE: 72 MMHG | OXYGEN SATURATION: 96 % | HEIGHT: 62 IN | WEIGHT: 187.4 LBS | TEMPERATURE: 97.4 F | SYSTOLIC BLOOD PRESSURE: 132 MMHG | RESPIRATION RATE: 15 BRPM

## 2023-12-15 DIAGNOSIS — E78.2 MIXED HYPERLIPIDEMIA: ICD-10-CM

## 2023-12-15 DIAGNOSIS — Z85.028 HISTORY OF GASTRIC CANCER: Primary | ICD-10-CM

## 2023-12-15 DIAGNOSIS — D50.0 IRON DEFICIENCY ANEMIA DUE TO CHRONIC BLOOD LOSS: ICD-10-CM

## 2023-12-15 DIAGNOSIS — R79.89 ELEVATED TSH: ICD-10-CM

## 2023-12-16 NOTE — PROGRESS NOTES
Meagan Nobles is a 58 y.o. female      Chief Complaint  Establish primary care provider  History of gastric carcinoma  History of elevated TSH level  Iron deficiency anemia  Hyperlipidemia       History of Present Illness  The patient presents today as a new patient. She has a history of gastric carcinoma that was resected. She is followed by Dr. Kaden Oswald of oncology. She is now several years out from her initial diagnosis and surgery. She has done well with no signs of recurrence.    The patient is doing well overall. She has been trying to keep an eye on her knee because it feels like it wants to tighten up. She does not remember doing anything    Elevated cholesterol levels, elevated TSH level and iron deficiency anemia.    The patient has been working at the [employer] for 11 years.      The following portions of the patient's history were reviewed and updated as appropriate: allergies, current medications, past social history and problem list.    Review of Systems   Constitutional:  Negative for appetite change, chills, diaphoresis, fatigue, fever and unexpected weight change.   Eyes:  Negative for visual disturbance.   Respiratory:  Negative for cough, chest tightness, shortness of breath and wheezing.    Cardiovascular:  Negative for chest pain, palpitations and leg swelling.   Gastrointestinal:  Negative for abdominal pain, diarrhea, nausea and vomiting.   Endocrine: Negative for polydipsia, polyphagia and polyuria.   Genitourinary:  Negative for dysuria, frequency and urgency.   Musculoskeletal:  Negative for arthralgias, back pain and myalgias.   Skin:  Negative for color change and rash.   Neurological:  Negative for dizziness, weakness, light-headedness, numbness and headaches.   Hematological:  Negative for adenopathy. Does not bruise/bleed easily.         Objective         Vitals:    12/15/23 1307   BP: 132/72   Pulse: 71   Resp: 15   Temp: 97.4 °F (36.3 °C)   SpO2: 96%         Physical  Exam  Vitals and nursing note reviewed.   Constitutional:       General: She is not in acute distress.     Appearance: Normal appearance. She is well-developed. She is not ill-appearing, toxic-appearing or diaphoretic.   HENT:      Head: Normocephalic.   Eyes:      Conjunctiva/sclera: Conjunctivae normal.      Pupils: Pupils are equal, round, and reactive to light.   Neck:      Thyroid: No thyromegaly.      Vascular: No JVD.   Cardiovascular:      Rate and Rhythm: Normal rate and regular rhythm.      Pulses: Normal pulses.      Heart sounds: Normal heart sounds. No murmur heard.  Pulmonary:      Effort: Pulmonary effort is normal.      Breath sounds: Normal breath sounds.   Abdominal:      General: Bowel sounds are normal.      Palpations: Abdomen is soft. There is no mass.      Tenderness: There is no abdominal tenderness.   Musculoskeletal:      Cervical back: Neck supple.   Lymphadenopathy:      Cervical: No cervical adenopathy.   Skin:     General: Skin is warm and dry.      Findings: No rash.   Neurological:      Mental Status: She is alert and oriented to person, place, and time.      Sensory: No sensory deficit.   Psychiatric:         Behavior: Behavior normal.              No results were obtained or interpreted today.      Assessment & Plan     Problems Addressed this Visit          Cardiac and Vasculature    Mixed hyperlipidemia       Hematology and Neoplasia    History of gastric cancer - Primary    Iron deficiency anemia due to chronic blood loss    Relevant Medications    Cyanocobalamin (VITAMIN B-12 PO)       Symptoms and Signs    Elevated TSH     Diagnoses         Codes Comments    History of gastric cancer    -  Primary ICD-10-CM: Z85.028  ICD-9-CM: V10.04     Iron deficiency anemia due to chronic blood loss     ICD-10-CM: D50.0  ICD-9-CM: 280.0     Mixed hyperlipidemia     ICD-10-CM: E78.2  ICD-9-CM: 272.2     Elevated TSH     ICD-10-CM: R79.89  ICD-9-CM: 794.5                Transcribed from  ambient dictation for CHARAN Main MD by Ricky Alejandre.  12/15/23   19:14 EST    Patient or patient representative verbalized consent to the visit recording.  I have personally performed the services described in this document as transcribed by the above individual, and it is both accurate and complete.

## 2024-02-01 ENCOUNTER — OFFICE VISIT (OUTPATIENT)
Dept: FAMILY MEDICINE CLINIC | Facility: CLINIC | Age: 59
End: 2024-02-01
Payer: COMMERCIAL

## 2024-02-01 VITALS
RESPIRATION RATE: 16 BRPM | OXYGEN SATURATION: 98 % | DIASTOLIC BLOOD PRESSURE: 76 MMHG | SYSTOLIC BLOOD PRESSURE: 130 MMHG | WEIGHT: 187.4 LBS | HEIGHT: 63 IN | HEART RATE: 75 BPM | TEMPERATURE: 98.4 F | BODY MASS INDEX: 33.2 KG/M2

## 2024-02-01 DIAGNOSIS — J40 BRONCHITIS: Primary | ICD-10-CM

## 2024-02-01 PROCEDURE — 99213 OFFICE O/P EST LOW 20 MIN: CPT | Performed by: FAMILY MEDICINE

## 2024-02-01 RX ORDER — PREDNISONE 10 MG/1
TABLET ORAL
Qty: 30 TABLET | Refills: 0 | Status: SHIPPED | OUTPATIENT
Start: 2024-02-01

## 2024-02-01 RX ORDER — AZITHROMYCIN 250 MG/1
TABLET, FILM COATED ORAL
Qty: 6 TABLET | Refills: 0 | Status: SHIPPED | OUTPATIENT
Start: 2024-02-01

## 2024-02-01 RX ORDER — CHOLECALCIFEROL (VITAMIN D3) 10(400)/ML
DROPS ORAL
COMMUNITY

## 2024-02-01 NOTE — PROGRESS NOTES
Meagan Nobles is a 58 y.o. female      Chief Complaint  Cough       History of Present Illness  The patient reports nonproductive cough for approximately 1 month. She says she does not feel bad, but the cough will not resolve. She has some postnasal drainage that she notes. She denies any fever, chills, chest pain, dyspnea, or pleuritic pain.    The following portions of the patient's history were reviewed and updated as appropriate: allergies, current medications, past social history and problem list.    Review of Systems   Constitutional:  Negative for chills, fatigue and fever.   HENT: Negative.     Respiratory:  Positive for cough, chest tightness and wheezing. Negative for shortness of breath.    Cardiovascular:  Negative for chest pain.   Gastrointestinal:  Negative for nausea.         Objective         Vitals:    02/01/24 1550   BP: 130/76   Pulse: 75   Resp: 16   Temp: 98.4 °F (36.9 °C)   SpO2: 98%         Physical Exam  Vitals and nursing note reviewed.   Constitutional:       General: She is not in acute distress.     Appearance: She is well-developed. She is not diaphoretic.   HENT:      Head: Normocephalic and atraumatic.      Nose: Nose normal.   Neck:      Vascular: No JVD.   Cardiovascular:      Rate and Rhythm: Normal rate and regular rhythm.      Heart sounds: Normal heart sounds. No murmur heard.  Pulmonary:      Effort: Pulmonary effort is normal. No respiratory distress.      Breath sounds: No stridor. Wheezing present.   Musculoskeletal:      Cervical back: Neck supple.   Lymphadenopathy:      Cervical: No cervical adenopathy.              No results were obtained or interpreted today.      Assessment & Plan     Problems Addressed this Visit    None  Visit Diagnoses       Bronchitis    -  Primary          Diagnoses         Codes Comments    Bronchitis    -  Primary ICD-10-CM: J40  ICD-9-CM: 490           Bronchitis.  Prescribed Z-Estuardo and tapering prednisone 30 mg x5 days, 20 mg x5  days, then 10 mg x5 days.      Transcribed from ambient dictation for R Jerry Main MD by Mackenzie Trejo.  02/01/24   17:45 EST    Patient or patient representative verbalized consent to the visit recording.  I have personally performed the services described in this document as transcribed by the above individual, and it is both accurate and complete.

## 2024-10-11 ENCOUNTER — TRANSCRIBE ORDERS (OUTPATIENT)
Dept: ADMINISTRATIVE | Facility: HOSPITAL | Age: 59
End: 2024-10-11
Payer: COMMERCIAL

## 2024-10-11 DIAGNOSIS — Z12.31 VISIT FOR SCREENING MAMMOGRAM: Primary | ICD-10-CM

## 2024-10-19 ENCOUNTER — HOSPITAL ENCOUNTER (OUTPATIENT)
Dept: MAMMOGRAPHY | Facility: HOSPITAL | Age: 59
Discharge: HOME | End: 2024-10-19
Admitting: OBSTETRICS & GYNECOLOGY
Payer: COMMERCIAL

## 2024-10-19 DIAGNOSIS — Z12.31 VISIT FOR SCREENING MAMMOGRAM: ICD-10-CM

## 2024-10-19 LAB
NCCN CRITERIA FLAG: NORMAL
TYRER CUZICK SCORE: 7

## 2024-10-19 PROCEDURE — 77067 SCR MAMMO BI INCL CAD: CPT

## 2024-10-19 PROCEDURE — 77063 BREAST TOMOSYNTHESIS BI: CPT

## 2024-11-17 NOTE — TELEPHONE ENCOUNTER
PT notified and verbalized understanding.  [id number]  [id number]  ~Shell     Pt did go to UC and then was routed to ER for her symptoms on 10/12/22.    Allergy;

## 2024-12-03 ENCOUNTER — TELEPHONE (OUTPATIENT)
Dept: FAMILY MEDICINE CLINIC | Facility: CLINIC | Age: 59
End: 2024-12-03

## 2024-12-03 NOTE — TELEPHONE ENCOUNTER
Caller: Astrid Nobles    Relationship to patient: Self    Best call back number:       [phone] (Mobile)     Chief complaint:     PATIENT REQUESTED A CALL BACK WITH CONFIRMATION IF SHE COULD BE SEEN IN OFFICE TODAY    PATIENT IS EXPERIENCING COUGH, RUNNY NOSE, CHEST CONGESTION    Type of visit:     SAME DAY    Requested date:     TODAY, 12/3     Additional notes:    PATIENT STATED IF IT IS NOT POSSIBLE FOR  HER TO BE SQUEEZED IN FOR APPOINTMENT TODAY, SHE WOULD LIKE ANTIBIOTIC TO BE PRESCRIBED INSTEAD    PREFERRED PHARMACY:    [facility] [place], [place]    TELEPHONE CONTACT:    [phone]

## 2025-01-02 ENCOUNTER — OFFICE VISIT (OUTPATIENT)
Dept: FAMILY MEDICINE CLINIC | Facility: CLINIC | Age: 60
End: 2025-01-02
Payer: COMMERCIAL

## 2025-01-02 VITALS
SYSTOLIC BLOOD PRESSURE: 172 MMHG | TEMPERATURE: 97.5 F | WEIGHT: 175 LBS | HEIGHT: 63 IN | OXYGEN SATURATION: 98 % | HEART RATE: 66 BPM | BODY MASS INDEX: 31.01 KG/M2 | DIASTOLIC BLOOD PRESSURE: 96 MMHG | RESPIRATION RATE: 16 BRPM

## 2025-01-02 DIAGNOSIS — R73.03 PREDIABETES: ICD-10-CM

## 2025-01-02 DIAGNOSIS — R79.89 ELEVATED TSH: ICD-10-CM

## 2025-01-02 DIAGNOSIS — E78.2 MIXED HYPERLIPIDEMIA: ICD-10-CM

## 2025-01-02 DIAGNOSIS — Z85.028 HISTORY OF GASTRIC CANCER: ICD-10-CM

## 2025-01-02 DIAGNOSIS — D50.0 IRON DEFICIENCY ANEMIA DUE TO CHRONIC BLOOD LOSS: ICD-10-CM

## 2025-01-02 DIAGNOSIS — Z00.00 ANNUAL PHYSICAL EXAM: Primary | ICD-10-CM

## 2025-01-02 PROCEDURE — 99396 PREV VISIT EST AGE 40-64: CPT | Performed by: FAMILY MEDICINE

## 2025-01-13 NOTE — PROGRESS NOTES
Meagan Nobles is a 59 y.o. female    Chief Complaint    Annual physical exam  History of gastric cancer  Iron deficiency anemia  Hyperlipidemia    History of Present Illness  History of Present Illness  The patient is a 59-year-old female who presents today for an annual physical exam. She has a history of gastric carcinoma. Other chronic issues include iron deficiency anemia, hyperlipidemia, elevated TSH, and prediabetes. She was previously following with Dr. Oswald in oncology but has not had any visits with him or anyone in his office in over a year.    She reports that her last scheduled appointment was postponed a few months prior. She has been discharged from Dr. Oswald' care after maintaining a cancer-free status for 5 years. She is currently not on any medications but is taking vitamins.    Approximately a month ago, she sought medical attention at [facility] due to symptoms of coughing and congestion. Tests for pneumonia, influenza, and COVID-19 were conducted, all of which returned negative results.    She has been fasting today, with the exception of consuming coffee. She reports feeling well overall and has no current health concerns. Her blood pressure was noted to be elevated during this visit, which she attributes to rushing to the appointment and the consumption of coffee. She does not monitor her blood pressure at home as she does not possess a blood pressure cuff.      The following portions of the patient's history were reviewed and updated as appropriate: allergies, current medications, past social history and problem list    Review of Systems   Constitutional: Negative.  Negative for appetite change, diaphoresis, fatigue and unexpected weight change.   HENT: Negative.     Eyes: Negative.  Negative for visual disturbance.   Respiratory: Negative.  Negative for cough, chest tightness and shortness of breath.    Cardiovascular: Negative.  Negative for chest pain, palpitations and  leg swelling.   Gastrointestinal: Negative.  Negative for diarrhea, nausea and vomiting.   Endocrine: Negative.  Negative for polydipsia, polyphagia and polyuria.   Genitourinary: Negative.    Musculoskeletal: Negative.    Skin: Negative.  Negative for color change and rash.   Allergic/Immunologic: Negative.    Neurological: Negative.  Negative for dizziness, syncope, weakness, light-headedness, numbness and headaches.   Hematological: Negative.    Psychiatric/Behavioral: Negative.     All other systems reviewed and are negative.      Objective     Vitals:    01/02/25 1405   BP: 172/96   Pulse: 66   Resp: 16   Temp: 97.5 °F (36.4 °C)   SpO2: 98%       Physical Exam  Vitals and nursing note reviewed.   Constitutional:       General: She is not in acute distress.     Appearance: Normal appearance. She is well-developed. She is obese. She is not ill-appearing, toxic-appearing or diaphoretic.   HENT:      Head: Normocephalic and atraumatic.      Right Ear: External ear normal.      Left Ear: External ear normal.      Mouth/Throat:      Mouth: Mucous membranes are moist.      Pharynx: Oropharynx is clear.   Eyes:      Conjunctiva/sclera: Conjunctivae normal.      Pupils: Pupils are equal, round, and reactive to light.   Neck:      Thyroid: No thyromegaly.      Vascular: No carotid bruit or JVD.   Cardiovascular:      Rate and Rhythm: Normal rate and regular rhythm.      Pulses: Normal pulses.      Heart sounds: Normal heart sounds. No murmur heard.  Pulmonary:      Effort: Pulmonary effort is normal. No respiratory distress.      Breath sounds: Normal breath sounds.   Abdominal:      General: Bowel sounds are normal.      Palpations: Abdomen is soft. There is no mass.      Tenderness: There is no abdominal tenderness.      Hernia: No hernia is present.   Musculoskeletal:         General: No swelling. Normal range of motion.      Cervical back: Normal range of motion and neck supple.      Right lower leg: No edema.       Left lower leg: No edema.   Lymphadenopathy:      Cervical: No cervical adenopathy.   Skin:     General: Skin is warm and dry.      Findings: No lesion or rash.   Neurological:      General: No focal deficit present.      Mental Status: She is alert and oriented to person, place, and time.      Cranial Nerves: No cranial nerve deficit.      Sensory: No sensory deficit.      Motor: No weakness.      Coordination: Coordination normal.      Gait: Gait normal.      Deep Tendon Reflexes: Reflexes are normal and symmetric.   Psychiatric:         Mood and Affect: Mood normal.         Behavior: Behavior normal.         Thought Content: Thought content normal.         Judgment: Judgment normal.   Physical Exam  Lungs are clear.  Heart sounds are normal.  Pulses are strong.    Vital Signs  Blood pressure is 172/96.    Assessment & Plan   Assessment & Plan  1. Annual physical examination.  She has a history of gastric carcinoma and has been cancer-free for 5 years. She reports no current issues and is not on any medications except for vitamins. A comprehensive set of laboratory tests will be ordered, including complete blood count, diabetes screening, cholesterol panel, iron levels, and B12 levels. She is advised to get these tests done at her convenience, with options available at various locations, including the hospital lab that is open on weekends.    2. Elevated blood pressure.  Her blood pressure was recorded at 172/96, which is higher than her usual readings. She attributes this to rushing to the appointment and having coffee. A recheck of her blood pressure will be performed before she leaves the clinic today. She is advised to monitor her blood pressure at home and consider getting a blood pressure cuff for regular monitoring.    Problems Addressed this Visit          Cardiac and Vasculature    Mixed hyperlipidemia    Relevant Orders    Comprehensive Metabolic Panel    Lipid Panel       Endocrine and Metabolic     Prediabetes    Relevant Orders    Hemoglobin A1c    Comprehensive Metabolic Panel       Hematology and Neoplasia    History of gastric cancer    Relevant Orders    CBC (No Diff)    Vitamin B12    Comprehensive Metabolic Panel    Iron    Iron deficiency anemia due to chronic blood loss    Relevant Orders    CBC (No Diff)    Vitamin B12    Vitamin D,25-Hydroxy    Iron       Symptoms and Signs    Elevated TSH    Relevant Orders    TSH    T4, Free    Comprehensive Metabolic Panel     Other Visit Diagnoses       Annual physical exam    -  Primary    Relevant Orders    CBC (No Diff)    TSH    T4, Free    Comprehensive Metabolic Panel    Lipid Panel          Diagnoses         Codes Comments    Annual physical exam    -  Primary ICD-10-CM: Z00.00  ICD-9-CM: V70.0     History of gastric cancer     ICD-10-CM: Z85.028  ICD-9-CM: V10.04     Iron deficiency anemia due to chronic blood loss     ICD-10-CM: D50.0  ICD-9-CM: 280.0     Mixed hyperlipidemia     ICD-10-CM: E78.2  ICD-9-CM: 272.2     Elevated TSH     ICD-10-CM: R79.89  ICD-9-CM: 794.5     Prediabetes     ICD-10-CM: R73.03  ICD-9-CM: 790.29           Preventive medicine discussed, diet, exercise, healthy living discussed at length.  Discussed nutrition, physical activity, healthy weight, injury prevention, misuse of tobacco, alcohol and drugs, dental health, mental health, immunizations, screening    Part of this note may be an electronic transcription/translation of spoken language to printed text using the Dragon Dictation System.

## 2025-07-17 ENCOUNTER — TRANSCRIBE ORDERS (OUTPATIENT)
Dept: ADMINISTRATIVE | Facility: HOSPITAL | Age: 60
End: 2025-07-17
Payer: COMMERCIAL

## 2025-07-17 DIAGNOSIS — Z12.31 VISIT FOR SCREENING MAMMOGRAM: Primary | ICD-10-CM

## (undated) DEVICE — MEDI-VAC YANKAUER SUCTION HANDLE W/BULBOUS TIP: Brand: CARDINAL HEALTH

## (undated) DEVICE — ENDOPATH XCEL BLADELESS TROCARS WITH STABILITY SLEEVES: Brand: ENDOPATH XCEL

## (undated) DEVICE — ADHS LIQ MASTISOL 2/3ML

## (undated) DEVICE — DRSNG WND BORDR/ADHS NONADHR/GZ LF 4X14IN STRL

## (undated) DEVICE — SAFESECURE,SECUREMENT,FOLEY CATH,STERILE: Brand: MEDLINE

## (undated) DEVICE — ENCORE® LATEX MICRO SIZE 6.5, STERILE LATEX POWDER-FREE SURGICAL GLOVE: Brand: ENCORE

## (undated) DEVICE — DRSNG TELFA PAD NONADH STR 1S 3X8IN

## (undated) DEVICE — MEDI-VAC NON-CONDUCTIVE SUCTION TUBING: Brand: CARDINAL HEALTH

## (undated) DEVICE — APPL CHLORAPREP W/TINT 26ML BLU

## (undated) DEVICE — ANTIBACTERIAL UNDYED BRAIDED (POLYGLACTIN 910), SYNTHETIC ABSORBABLE SUTURE: Brand: COATED VICRYL

## (undated) DEVICE — SUT VIC 3/0 SH 36IN J527H

## (undated) DEVICE — DUAL LUMEN STOMACH TUBE,ANTI-REFLUX VALVE: Brand: SALEM SUMP

## (undated) DEVICE — ST BLOOD ADMIN YTP 80IN

## (undated) DEVICE — SUT SILK 3/0 SH CR8 18IN C013D

## (undated) DEVICE — TOTAL TRAY, 16FR 10ML SIL FOLEY, URN: Brand: MEDLINE

## (undated) DEVICE — SUT PROLN 2/0 V7 36IN 8977H

## (undated) DEVICE — CANNULA,OXY,ADULT,SUPERSOFT,W/7'TUB,UC: Brand: MEDLINE

## (undated) DEVICE — SPNG LAP PREWSH SFTPK 18X18IN STRL PK/5

## (undated) DEVICE — VISUALIZATION SYSTEM: Brand: CLEARIFY

## (undated) DEVICE — SUT VIC 0 UR6 27IN VCP603H

## (undated) DEVICE — DRSNG SURESITE WNDW 2.38X2.75

## (undated) DEVICE — SUT SILK 3/0 TIES 18IN A184H

## (undated) DEVICE — ENDOCUT SCISSOR TIP, DISPOSABLE: Brand: RENEW

## (undated) DEVICE — COVER,LIGHT HANDLE,FLX,1/PK: Brand: MEDLINE INDUSTRIES, INC.

## (undated) DEVICE — FLTR HME STR UNIV W/SMPL PORT

## (undated) DEVICE — LEX GENERAL ABDOMINAL SPLIT: Brand: MEDLINE INDUSTRIES, INC.

## (undated) DEVICE — BLD SCLPL SS NO24 STRL

## (undated) DEVICE — GLV SURG SIGNATURE TOUCH PF LTX 6.5 STRL BX/50

## (undated) DEVICE — ST NERV BLCK CONT CONTIPLEX ECHO CLSD 18G 4IN

## (undated) DEVICE — DRSNG SURESITE WNDW 4X4.5

## (undated) DEVICE — SUT SILK 2/0 PS 18IN 1588H

## (undated) DEVICE — GOWN,PREVENTION PLUS,XXLARGE,STERILE: Brand: MEDLINE

## (undated) DEVICE — SUT PDS 1 TP1 48IN Z880G BX/12

## (undated) DEVICE — [HIGH FLOW HEATED INSUFFLATOR TUBING,  DO NOT USE IF PACKAGE IS DAMAGED]

## (undated) DEVICE — ENDOPATH XCEL UNIVERSAL TROCAR STABLILITY SLEEVES: Brand: ENDOPATH XCEL

## (undated) DEVICE — PK MINOR SPLT 10

## (undated) DEVICE — SUT SILK 2/0 SH 30IN K833H

## (undated) DEVICE — TRAP,MUCUS SPECIMEN,40CC: Brand: MEDLINE

## (undated) DEVICE — ADAPT ST INFUS ADMIN SYR 70IN

## (undated) DEVICE — SUT SILK 2/0 TIES 18IN A185H

## (undated) DEVICE — AIRWY 90MM NO9

## (undated) DEVICE — TP UMB COTN 30IN U11T

## (undated) DEVICE — ST EXT IV SMARTSITE 2VLV SP M LL 5ML IV1

## (undated) DEVICE — 3M™ STERI-STRIP™ REINFORCED ADHESIVE SKIN CLOSURES, R1547, 1/2 IN X 4 IN (12 MM X 100 MM), 6 STRIPS/ENVELOPE: Brand: 3M™ STERI-STRIP™

## (undated) DEVICE — 3M™ RANGER™ BLOOD/FLUID WARMING STANDARD FLOW SET, 24200, 10/CASE: Brand: 3M™ RANGER™

## (undated) DEVICE — SUT SILK 2/0 SH CR8 18IN CR8 C012D

## (undated) DEVICE — PK LAP LASR CHOLE 10